# Patient Record
Sex: FEMALE | Race: WHITE | NOT HISPANIC OR LATINO | Employment: FULL TIME | URBAN - METROPOLITAN AREA
[De-identification: names, ages, dates, MRNs, and addresses within clinical notes are randomized per-mention and may not be internally consistent; named-entity substitution may affect disease eponyms.]

---

## 2019-07-11 ENCOUNTER — TRANSFERRED RECORDS (OUTPATIENT)
Dept: HEALTH INFORMATION MANAGEMENT | Facility: CLINIC | Age: 34
End: 2019-07-11

## 2019-07-11 LAB
HPV ABSTRACT: NORMAL
PAP-ABSTRACT: NORMAL

## 2021-01-18 ENCOUNTER — OFFICE VISIT (OUTPATIENT)
Dept: FAMILY MEDICINE | Facility: CLINIC | Age: 36
End: 2021-01-18
Payer: COMMERCIAL

## 2021-01-18 VITALS
HEART RATE: 69 BPM | SYSTOLIC BLOOD PRESSURE: 133 MMHG | OXYGEN SATURATION: 99 % | BODY MASS INDEX: 24.24 KG/M2 | HEIGHT: 63 IN | WEIGHT: 136.8 LBS | DIASTOLIC BLOOD PRESSURE: 74 MMHG | TEMPERATURE: 99 F

## 2021-01-18 DIAGNOSIS — Z32.01 PREGNANCY TEST PERFORMED, PREGNANCY CONFIRMED: Primary | ICD-10-CM

## 2021-01-18 LAB — HCG UR QL: POSITIVE

## 2021-01-18 PROCEDURE — 81025 URINE PREGNANCY TEST: CPT | Performed by: FAMILY MEDICINE

## 2021-01-18 PROCEDURE — 99203 OFFICE O/P NEW LOW 30 MIN: CPT | Performed by: FAMILY MEDICINE

## 2021-01-18 RX ORDER — ALBUTEROL SULFATE 90 UG/1
1-2 AEROSOL, METERED RESPIRATORY (INHALATION)
COMMUNITY
Start: 2020-01-02 | End: 2021-09-11

## 2021-01-18 RX ORDER — PRENATAL VIT/IRON FUM/FOLIC AC 27MG-0.8MG
1 TABLET ORAL DAILY
Qty: 90 TABLET | Refills: 3 | Status: SHIPPED | OUTPATIENT
Start: 2021-01-18

## 2021-01-18 RX ORDER — FEXOFENADINE HCL 60 MG/1
60 TABLET, FILM COATED ORAL
COMMUNITY
End: 2021-01-25

## 2021-01-18 RX ORDER — BETAMETHASONE DIPROPIONATE 0.5 MG/G
OINTMENT, AUGMENTED TOPICAL
COMMUNITY
Start: 2020-09-29 | End: 2021-09-11

## 2021-01-18 ASSESSMENT — MIFFLIN-ST. JEOR: SCORE: 1277.65

## 2021-01-18 ASSESSMENT — PAIN SCALES - GENERAL: PAINLEVEL: NO PAIN (0)

## 2021-01-18 NOTE — PROGRESS NOTES
"  Assessment & Plan     Pregnancy test performed, pregnancy confirmed  Positive test, confirmed.  Last menstrual cycle was November 20, 2020  Patient continue with prenatal vitamin.  Advised patient to avoid over-the-counter NSAIDs.  She may take Tylenol as needed.  She was given a card to follow-up with OB/GYN .    - HCG Qual, Urine ([id number])  - Prenatal Vit-Fe Fumarate-FA (PRENATAL MULTIVITAMIN W/IRON) 27-0.8 MG tablet; Take 1 tablet by mouth daily    Review of the result(s) of each unique test - pregnancy test           There are no Patient Instructions on file for this visit.    No follow-ups on file.    Singh Gray MD  [facility] ALYSON Rhodes is a 35 year old who presents to clinic today for the following health issues :  Patient had a positive pregnancy test, she is G1, para 0.  Her last menstrual cycle was November 20, 2020.  Currently denies nausea, has no morning sickness, has no other symptoms.  She is on birth control pill.    Review of Systems   Constitutional, HEENT, cardiovascular, pulmonary, gi and gu systems are negative, except as otherwise noted.      Objective    /74 (BP Location: Left arm, Patient Position: Chair, Cuff Size: Adult Regular)   Pulse 69   Temp 99  F (37.2  C) (Oral)   Ht 1.605 m (5' 3.19\")   Wt 62.1 kg (136 lb 12.8 oz)   SpO2 99%   BMI 24.09 kg/m    Body mass index is 24.09 kg/m .  Physical Exam   GENERAL: healthy, alert and no distress  PSYCH: mentation appears normal, affect normal/bright    Positive for Pregnancy test.        Singh Gray MD        "

## 2021-01-25 ENCOUNTER — PRENATAL OFFICE VISIT (OUTPATIENT)
Dept: NURSING | Facility: CLINIC | Age: 36
End: 2021-01-25
Payer: COMMERCIAL

## 2021-01-25 ENCOUNTER — RECORDS - HEALTHEAST (OUTPATIENT)
Dept: ADMINISTRATIVE | Facility: OTHER | Age: 36
End: 2021-01-25

## 2021-01-25 VITALS — BODY MASS INDEX: 24.1 KG/M2 | WEIGHT: 136 LBS | HEIGHT: 63 IN

## 2021-01-25 DIAGNOSIS — Z23 NEED FOR TDAP VACCINATION: ICD-10-CM

## 2021-01-25 DIAGNOSIS — O09.519 ENCOUNTER FOR SUPERVISION OF PRIMIGRAVIDA OF ADVANCED MATERNAL AGE: Primary | ICD-10-CM

## 2021-01-25 PROCEDURE — 99207 PR NO CHARGE NURSE ONLY: CPT

## 2021-01-25 ASSESSMENT — MIFFLIN-ST. JEOR: SCORE: 1274.98

## 2021-01-25 NOTE — PROGRESS NOTES
Important Information for Provider:     New ob nurse intake by phone, first pregnancy, AMA.  Ordered first trimester screening/NIPT. Handouts reviewed and given at NOB appointment 1/28/2021 with Dr Lira. Recommended B6, Unisom for nausea, patient gets nauseated at night.      Patient's mother has form of anemia( please ask what it was again, did not have the correct spelling)     Caffeine intake/servings daily - 1  Calcium intake/servings daily - 3 takes calcium 500 mg with D3  Exercise 3 times weekly - describe, walks most days, low impact, barre classes, yoga   Sunscreen used - Yes  Seatbelts used - Yes  Guns stored in the home - No  Self Breast Exam - Yes  Pap test up to date -  Yes  Eye exam up to date -  No  Dental exam up to date -  No  Immunizations reviewed and up to date - Yes  Abuse: Current or Past (Physical, Sexual or Emotional) - No  Do you feel safe in your environment - Yes  Do you cope well with stress - Yes  Do you suffer from insomnia - No         Prenatal OB Questionnaire  Patient supplied answers from flow sheet for:  Prenatal OB Questionnaire.  Past Medical History  Diabetes?: No  Hypertension : No  Heart disease, mitral valve prolapse or rheumatic fever?: No  An autoimmune disease such as lupus or rheumatoid arthritis?: No  Kidney disease or urinary tract infection?: No  Epilepsy, seizures or spells?: No  Migraine headaches?: No  A stroke or loss of function or sensation?: No  Any other neurological problems?: No  Have you ever been treated for depression?: No  Are you having problems with crying spells or loss of self-esteem?: No  Have you ever required psychiatric care?: No  Have you ever had hepatitis, liver disease or jaundice?: No  Have you been treated for blood clots in your veins, deep vein thrombosis, inflammation in the veins, thrombosis, phlebitis, pulmonary embolism or varicosities?: No  Have you had excessive bleeding after surgery or dental work?: No  Do you bleed more than other  women after a cut or scratch?: No  Do you have a history of anemia?: No  Have you ever had thyroid problems or taken thyroid medication?: No   Do you have any endocrine problems?: No  Have you ever been in a major accident or suffered serious trauma?: No  Within the last year, has anyone hit, slapped, kicked or otherwise hurt you?: No  In the last year, has anyone forced you to have sex when you didn't want to?: No    Past Medical History 2   Have you ever received a blood transfusion?: No  Would you refuse a blood transfusion if a doctor judged it to be medically necessary?: No   If you answered Yes, would you rather die than receive a blood transfusion?: No  If you answered Yes, is this for Hoahaoism reasons?: No  Does anyone in your home smoke?: No  Do you use tobacco products?: No  Do you drink beer, wine or hard liquor?: No  Do you use any of the following: marijuana, speed, cocaine, heroin, hallucinogens or other drugs?: No   Is your blood type Rh negative?: Unknown  Have you ever had abnormal antibodies in your blood?: Unknown  Have you ever had asthma?: Yes  Have you ever had tuberculosis?: No  Do you have any allergies to drugs or over-the-counter medications?: No  Allergies: Dust Mites, Aspartame, Ethanol, Venlafaxine, Hydrochloride, Sertraline: seasonal  Have you had any breast problems?: No  Have you ever ?: No  Have you had any gynecological surgical procedures such as cervical conization, a LEEP procedure, laser treatment, cryosurgery of the cervix or a dilation and curettage, etc?: No  Have you ever had any other surgical procedures?: wisdom teeth  Have you been hospitalized for a nonsurgical reason excluding normal delivery?: No  Have you ever had any anesthetic complications?: No  Have you ever had an abnormal pap smear?: (!) Yes, 2019, colp    Past Medical History (Continued)  Do you have a history of abnormalities of the uterus?: No  Did your mother take MARIA E or any other hormones when she  was pregnant with you?: No  Did it take you more than a year to become pregnant?: No  Have you ever been evaluated or treated for infertility?: No  Is there a history of medical problems in your family, which you feel may be important to this pregnancy?: No  Do you have any other problems we have not asked about which you feel may be important to this pregnancy?: No    Symptoms since last menstrual period  Do you have any of the following symptoms: abdominal pain, blood in stools or urine, chest pain, shortness of breath, coughing or vomiting up blood, your heart racing or skipping beats, nausea and vomiting, pain on urination or vaginal discharge or bleed: (!) Yes  Will the patient be 35 years old or older at the time of delivery?: (!) Yes    Has the patient, baby's father or anyone in either family had:  Thalassemia (Italian, Greek, Mediterranean or  background only) and an MCV result less than 80?: No  Neural tube defect such as meningomyelocele, spina bifida or anencephaly?: No  Congenital heart defect?: patient's brother  Down's Syndrome?: No  Tim-Sachs disease (Adventism, Cajun, Greenlandic-[place])?: No  Sickle cell disease or trait ()?: No  Hemophilia or other inherited problems of blood?: No  Muscular dystrophy?: No  Cystic fibrosis?: No  [place]'s chorea?: No  Mental retardation/autism?: No  If yes, was the person tested for fragile X?: No  Any other inherited genetic or chromosomal disorder?: No  Maternal metabolic disorder (e.g Insulin-dependent diabetes, PKU)?: No  A child with birth defects not listed above?: No  Recurrent pregnancy loss or stillbirth?: No   Has the patient had any medications/street drugs/alcohol since her last menstrual period?: No  Does the patient or baby's father have any other genetic risks?: No    Infection History   Do you object to being tested for Hepatitis B?: No  Do you object to being tested for HIV?: No   Do you feel that you are at high risk for coming in contact  with the AIDS virus?: No  Have you ever been treated for tuberculosis?: No  Have you ever had a positive skin test for tuberculosis?: No  Do you live with someone who has tuberculosis?: No  Have you ever been exposed to tuberculosis?: No  Do you have genital herpes?: No  Does your partner have genital herpes?: No  Have you had a viral illness since your last period?: No  Have you ever had gonorrhea, chlamydia, syphilis, venereal warts, trichomoniasis, pelvic inflammatory disease or any other sexually transmitted disease?: No  Do you know if you are a genital group B streptococcus carrier?: No  Have you had chicken pox/varicella?: (!) Yes   Have you been vaccinated against chicken Pox?: No  Have you had any other infectious diseases?: Whopping cough/ Mono 16 years old      Allergies as of 1/25/2021:    Allergies as of 01/25/2021     (No Known Allergies)       Current medications are:  Current Outpatient Medications   Medication Sig Dispense Refill     Prenatal Vit-Fe Fumarate-FA (PRENATAL MULTIVITAMIN W/IRON) 27-0.8 MG tablet Take 1 tablet by mouth daily 90 tablet 3     albuterol (PROAIR HFA/PROVENTIL HFA/VENTOLIN HFA) 108 (90 Base) MCG/ACT inhaler Inhale 1-2 puffs into the lungs       augmented betamethasone dipropionate (DIPROLENE-AF) 0.05 % external ointment Apply a thin layer to affected skin twice a day for up to 2 weeks (avoid face, body folds)       beclomethasone HFA (QVAR REDIHALER) 40 MCG/ACT inhaler Inhale 40 mcg into the lungs           Early ultrasound screening tool:    Does patient have irregular periods?  No  Did patient use hormonal birth control in the three months prior to positive urine pregnancy test? No  Is the patient breastfeeding?  No  Is the patient 10 weeks or greater at time of education visit?  No

## 2021-01-26 ENCOUNTER — TRANSCRIBE ORDERS (OUTPATIENT)
Dept: MATERNAL FETAL MEDICINE | Facility: CLINIC | Age: 36
End: 2021-01-26

## 2021-01-26 DIAGNOSIS — O26.90 PREGNANCY RELATED CONDITION, ANTEPARTUM: Primary | ICD-10-CM

## 2021-01-28 ENCOUNTER — APPOINTMENT (OUTPATIENT)
Dept: LAB | Facility: CLINIC | Age: 36
End: 2021-01-28
Payer: COMMERCIAL

## 2021-01-28 ENCOUNTER — PRENATAL OFFICE VISIT (OUTPATIENT)
Dept: OBGYN | Facility: CLINIC | Age: 36
End: 2021-01-28
Payer: COMMERCIAL

## 2021-01-28 VITALS
SYSTOLIC BLOOD PRESSURE: 122 MMHG | WEIGHT: 134 LBS | TEMPERATURE: 98.1 F | DIASTOLIC BLOOD PRESSURE: 77 MMHG | BODY MASS INDEX: 23.55 KG/M2 | HEART RATE: 77 BPM

## 2021-01-28 DIAGNOSIS — O09.511 ENCOUNTER FOR SUPERVISION OF PRIMIGRAVIDA OF ADVANCED MATERNAL AGE IN FIRST TRIMESTER: ICD-10-CM

## 2021-01-28 LAB
ALBUMIN UR-MCNC: NEGATIVE MG/DL
APPEARANCE UR: CLEAR
BILIRUB UR QL STRIP: NEGATIVE
COLOR UR AUTO: YELLOW
ERYTHROCYTE [DISTWIDTH] IN BLOOD BY AUTOMATED COUNT: 12 % (ref 10–15)
GLUCOSE UR STRIP-MCNC: NEGATIVE MG/DL
HCT VFR BLD AUTO: 37.8 % (ref 35–47)
HGB BLD-MCNC: 12.4 G/DL (ref 11.7–15.7)
HGB UR QL STRIP: ABNORMAL
KETONES UR STRIP-MCNC: NEGATIVE MG/DL
LEUKOCYTE ESTERASE UR QL STRIP: NEGATIVE
MCH RBC QN AUTO: 30.1 PG (ref 26.5–33)
MCHC RBC AUTO-ENTMCNC: 32.8 G/DL (ref 31.5–36.5)
MCV RBC AUTO: 92 FL (ref 78–100)
NITRATE UR QL: NEGATIVE
PH UR STRIP: 6 PH (ref 5–7)
PLATELET # BLD AUTO: 315 10E9/L (ref 150–450)
RBC # BLD AUTO: 4.12 10E12/L (ref 3.8–5.2)
SOURCE: ABNORMAL
SP GR UR STRIP: 1.02 (ref 1–1.03)
UROBILINOGEN UR STRIP-ACNC: 0.2 EU/DL (ref 0.2–1)
WBC # BLD AUTO: 12.3 10E9/L (ref 4–11)

## 2021-01-28 PROCEDURE — 87340 HEPATITIS B SURFACE AG IA: CPT | Performed by: OBSTETRICS & GYNECOLOGY

## 2021-01-28 PROCEDURE — 99000 SPECIMEN HANDLING OFFICE-LAB: CPT | Performed by: OBSTETRICS & GYNECOLOGY

## 2021-01-28 PROCEDURE — 99207 PR FIRST OB VISIT: CPT | Performed by: OBSTETRICS & GYNECOLOGY

## 2021-01-28 PROCEDURE — 86901 BLOOD TYPING SEROLOGIC RH(D): CPT | Performed by: OBSTETRICS & GYNECOLOGY

## 2021-01-28 PROCEDURE — 81003 URINALYSIS AUTO W/O SCOPE: CPT | Performed by: OBSTETRICS & GYNECOLOGY

## 2021-01-28 PROCEDURE — 36415 COLL VENOUS BLD VENIPUNCTURE: CPT | Performed by: OBSTETRICS & GYNECOLOGY

## 2021-01-28 PROCEDURE — 86780 TREPONEMA PALLIDUM: CPT | Mod: 90 | Performed by: OBSTETRICS & GYNECOLOGY

## 2021-01-28 PROCEDURE — 87086 URINE CULTURE/COLONY COUNT: CPT | Performed by: OBSTETRICS & GYNECOLOGY

## 2021-01-28 PROCEDURE — 86900 BLOOD TYPING SEROLOGIC ABO: CPT | Performed by: OBSTETRICS & GYNECOLOGY

## 2021-01-28 PROCEDURE — 86850 RBC ANTIBODY SCREEN: CPT | Performed by: OBSTETRICS & GYNECOLOGY

## 2021-01-28 PROCEDURE — 86762 RUBELLA ANTIBODY: CPT | Performed by: OBSTETRICS & GYNECOLOGY

## 2021-01-28 PROCEDURE — 87389 HIV-1 AG W/HIV-1&-2 AB AG IA: CPT | Performed by: OBSTETRICS & GYNECOLOGY

## 2021-01-28 PROCEDURE — 85027 COMPLETE CBC AUTOMATED: CPT | Performed by: OBSTETRICS & GYNECOLOGY

## 2021-01-29 LAB
ABO + RH BLD: NORMAL
ABO + RH BLD: NORMAL
BLD GP AB SCN SERPL QL: NORMAL
BLOOD BANK CMNT PATIENT-IMP: NORMAL
SPECIMEN EXP DATE BLD: NORMAL
T PALLIDUM AB SER QL: NONREACTIVE

## 2021-01-30 LAB
BACTERIA SPEC CULT: NORMAL
Lab: NORMAL
SPECIMEN SOURCE: NORMAL

## 2021-01-30 NOTE — PROGRESS NOTES
SUBJECTIVE: Meagan Monroy is a 35 year old   here for initial OB visit.  IUD removed last summer.   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   Mild nausea at night, otherwise doing well.    Past Medical History:   Diagnosis Date     Mild asthma        Past Surgical History:   Procedure Laterality Date     wisdom teeth         Family History   Problem Relation Age of Onset     Asthma Mother      Tongue cancer Father      Hypertension Father      Diabetes Paternal Grandmother      Heart Failure Paternal Grandmother      Childhood Heart Disease Brother        Social History     Socioeconomic History     Marital status: Single     Spouse name: Not on file     Number of children: Not on file     Years of education: Not on file     Highest education level: Not on file   Occupational History     Not on file   Social Needs     Financial resource strain: Not on file     Food insecurity     Worry: Not on file     Inability: Not on file     Transportation needs     Medical: Not on file     Non-medical: Not on file   Tobacco Use     Smoking status: Never Smoker     Smokeless tobacco: Never Used   Substance and Sexual Activity     Alcohol use: Not Currently     Drug use: Never     Sexual activity: Yes     Partners: Male   Lifestyle     Physical activity     Days per week: Not on file     Minutes per session: Not on file     Stress: Not on file   Relationships     Social connections     Talks on phone: Not on file     Gets together: Not on file     Attends Mandaen service: Not on file     Active member of club or organization: Not on file     Attends meetings of clubs or organizations: Not on file     Relationship status: Not on file     Intimate partner violence     Fear of current or ex partner: Not on file     Emotionally abused: Not on file     Physically abused: Not on file     Forced sexual activity: Not on file   Other Topics Concern     Not on file   Social  History Narrative     Not on file         Current Outpatient Medications:      augmented betamethasone dipropionate (DIPROLENE-AF) 0.05 % external ointment, Apply a thin layer to affected skin twice a day for up to 2 weeks (avoid face, body folds), Disp: , Rfl:      Prenatal Vit-Fe Fumarate-FA (PRENATAL MULTIVITAMIN W/IRON) 27-0.8 MG tablet, Take 1 tablet by mouth daily, Disp: 90 tablet, Rfl: 3     albuterol (PROAIR HFA/PROVENTIL HFA/VENTOLIN HFA) 108 (90 Base) MCG/ACT inhaler, Inhale 1-2 puffs into the lungs, Disp: , Rfl:      beclomethasone HFA (QVAR REDIHALER) 40 MCG/ACT inhaler, Inhale 40 mcg into the lungs, Disp: , Rfl:     No Known Allergies      Past Medical History of Father of Baby: No significant medical history    Review of Systems:   Constitutional, HEENT, cardiovascular, pulmonary, gi and gu systems are negative, except as otherwise noted.     History Since Last Menstrual Period: Nausea    EXAM:   Vitals:    01/28/21 1534   BP: 122/77   Pulse: 77   Temp: 98.1  F (36.7  C)   TempSrc: Oral   Weight: 60.8 kg (134 lb)     Body mass index is 23.55 kg/m .  GENERAL APPEARANCE: healthy, alert and no distress  EYES: EOMI,  PERRL  HENT: Nose and mouth without ulcers or lesions  NECK: no adenopathy, no asymmetry, masses, or scars and thyroid normal to palpation  RESP: lungs clear to auscultation - no rales, rhonchi or wheezes  BREAST: normal without masses, tenderness or nipple discharge and no palpable axillary masses or adenopathy  CV: regular rates and rhythm, normal S1 S2, no S3 or S4 and no murmur, click or rub -  ABDOMEN:  soft, nontender, no HSM or masses and bowel sounds normal  : normal cervix, adnexae, and uterus without masses or discharge  MS: extremities normal- no gross deformities noted, no evidence of inflammation in joints, FROM in all extremities.  SKIN: no suspicious lesions or rashes  NEURO: Normal strength and tone, sensory exam grossly normal, mentation intact and speech normal  PSYCH:  mentation appears normal and affect normal/bright  LYMPHATICS: No axillary, cervical, inguinal, or supraclavicular nodes    Pelvix exam:  Perineum: Intact;   Vulva: Normal;  Vagina: Normal mucosa, no discharge,   Cervix: Nulliparous, closed, mobile,  no discharge;  Uterus: 10 weeks, Normal shape, position and consistency;   Adnexa: Normal;  Anus: Normal without lesion or mass;   Bony Pelvis: Adequate.     Ultrasound: Informal for heart tones. Viable jacques IUP c/w previous dating.    ASSESSMENT/ PLAN:  Meagan Monroy is a 35 year old   at 9 weeks 9 days by LMP c/w ultrasound today. EDC 21  Follow up in 5 weeks.  Normal exercise.  Normal sexual activity.  Prenatal vitamins.  Anticipated weight gain:  BMI <25: 25-35 pounds  S/p flu shot. Plans COVID vaccine.   TDaP 27-36 weeks  Oriented to practice, PNC  Discussed aneuploidy screening, has NIPT scheduled. Discussed comprehensive fetal survey for AMA and ordered.

## 2021-02-01 LAB
HBV SURFACE AG SERPL QL IA: NONREACTIVE
HIV 1+2 AB+HIV1 P24 AG SERPL QL IA: NONREACTIVE
RUBV IGG SERPL IA-ACNC: 16 IU/ML

## 2021-02-03 ENCOUNTER — AMBULATORY - HEALTHEAST (OUTPATIENT)
Dept: MATERNAL FETAL MEDICINE | Facility: HOSPITAL | Age: 36
End: 2021-02-03

## 2021-02-03 DIAGNOSIS — O26.90 PREGNANCY, ANTEPARTUM, COMPLICATIONS: ICD-10-CM

## 2021-02-08 ENCOUNTER — AMBULATORY - HEALTHEAST (OUTPATIENT)
Dept: MATERNAL FETAL MEDICINE | Facility: HOSPITAL | Age: 36
End: 2021-02-08

## 2021-02-11 ENCOUNTER — OFFICE VISIT - HEALTHEAST (OUTPATIENT)
Dept: MATERNAL FETAL MEDICINE | Facility: HOSPITAL | Age: 36
End: 2021-02-11

## 2021-02-11 ENCOUNTER — RECORDS - HEALTHEAST (OUTPATIENT)
Dept: ADMINISTRATIVE | Facility: OTHER | Age: 36
End: 2021-02-11

## 2021-02-11 ENCOUNTER — AMBULATORY - HEALTHEAST (OUTPATIENT)
Dept: LAB | Facility: HOSPITAL | Age: 36
End: 2021-02-11

## 2021-02-11 ENCOUNTER — RECORDS - HEALTHEAST (OUTPATIENT)
Dept: ULTRASOUND IMAGING | Facility: HOSPITAL | Age: 36
End: 2021-02-11

## 2021-02-11 DIAGNOSIS — O09.511 SUPERVISION OF ELDERLY PRIMIGRAVIDA IN FIRST TRIMESTER: ICD-10-CM

## 2021-02-11 DIAGNOSIS — O26.90 PREGNANCY, ANTEPARTUM, COMPLICATIONS: ICD-10-CM

## 2021-02-11 DIAGNOSIS — O26.90 PREGNANCY RELATED CONDITIONS, UNSPECIFIED, UNSPECIFIED TRIMESTER: ICD-10-CM

## 2021-02-11 DIAGNOSIS — O09.529 AMA (ADVANCED MATERNAL AGE) MULTIGRAVIDA 35+: ICD-10-CM

## 2021-02-15 ENCOUNTER — TELEPHONE (OUTPATIENT)
Dept: OBGYN | Facility: CLINIC | Age: 36
End: 2021-02-15

## 2021-02-15 NOTE — TELEPHONE ENCOUNTER
Pt has recently started experiencing really severe pain in her lower abdomen.  She got diaphoretic and pain feels like very strong waves of cramping.  No bleeding or LOF. No c/o constipation. Putting some pressure on the area did seem to decrease the pain slightly.  She wondered if she could take tylenol for the pain.  Advised tylenol would be ok, moving around, knees to chest to try and disperse gas pain.  She is going to call back in an hour if pain has not gotten better.  Advised possible clinic visit or if pain is more severe, ED for evaluation.  Ankita Arora RN

## 2021-02-15 NOTE — TELEPHONE ENCOUNTER
Patient called back. Her pain has not gotten worse, but it's still there. She drank 3 glasses of water and some Tylenol. Seems to be helping. Is not taking anything for constipation. Advised increasing water to 10-12 glasses of fluids per day, increase fiber intake and can add in Miralax or colace to try to have bowel movement. Pt stated understanding. She will call back if the pain gets worse or is that rolling feeling again. Advised to present to ER after hours if pain is severe. Patient stated understanding.   Elma Lowry RN-BSN

## 2021-02-17 ENCOUNTER — COMMUNICATION - HEALTHEAST (OUTPATIENT)
Dept: MATERNAL FETAL MEDICINE | Facility: HOSPITAL | Age: 36
End: 2021-02-17

## 2021-02-17 LAB — TRISOMY 21,18,13 - HE HISTORICAL: NORMAL

## 2021-03-02 ENCOUNTER — PRENATAL OFFICE VISIT (OUTPATIENT)
Dept: OBGYN | Facility: CLINIC | Age: 36
End: 2021-03-02
Payer: COMMERCIAL

## 2021-03-02 VITALS
BODY MASS INDEX: 24.31 KG/M2 | SYSTOLIC BLOOD PRESSURE: 102 MMHG | WEIGHT: 137.2 LBS | DIASTOLIC BLOOD PRESSURE: 57 MMHG | HEART RATE: 66 BPM | TEMPERATURE: 97.8 F | HEIGHT: 63 IN

## 2021-03-02 DIAGNOSIS — O09.512 ENCOUNTER FOR SUPERVISION OF PRIMIGRAVIDA OF ADVANCED MATERNAL AGE IN SECOND TRIMESTER: Primary | ICD-10-CM

## 2021-03-02 PROCEDURE — 99207 PR PRENATAL VISIT: CPT | Performed by: OBSTETRICS & GYNECOLOGY

## 2021-03-02 ASSESSMENT — ANXIETY QUESTIONNAIRES
7. FEELING AFRAID AS IF SOMETHING AWFUL MIGHT HAPPEN: SEVERAL DAYS
5. BEING SO RESTLESS THAT IT IS HARD TO SIT STILL: NOT AT ALL
2. NOT BEING ABLE TO STOP OR CONTROL WORRYING: NOT AT ALL
IF YOU CHECKED OFF ANY PROBLEMS ON THIS QUESTIONNAIRE, HOW DIFFICULT HAVE THESE PROBLEMS MADE IT FOR YOU TO DO YOUR WORK, TAKE CARE OF THINGS AT HOME, OR GET ALONG WITH OTHER PEOPLE: NOT DIFFICULT AT ALL
1. FEELING NERVOUS, ANXIOUS, OR ON EDGE: NOT AT ALL
GAD7 TOTAL SCORE: 3
3. WORRYING TOO MUCH ABOUT DIFFERENT THINGS: SEVERAL DAYS
6. BECOMING EASILY ANNOYED OR IRRITABLE: SEVERAL DAYS

## 2021-03-02 ASSESSMENT — MIFFLIN-ST. JEOR: SCORE: 1280.43

## 2021-03-02 ASSESSMENT — PATIENT HEALTH QUESTIONNAIRE - PHQ9
SUM OF ALL RESPONSES TO PHQ QUESTIONS 1-9: 2
5. POOR APPETITE OR OVEREATING: NOT AT ALL

## 2021-03-02 NOTE — PROGRESS NOTES
Doing well.   Had some constipation - used miralax.   NIPT normal, did not disclose fetal sex.  Comprehensive fetal survey scheduled.   RTC 4 weeks. AFP then.

## 2021-03-03 ASSESSMENT — ANXIETY QUESTIONNAIRES: GAD7 TOTAL SCORE: 3

## 2021-03-21 ENCOUNTER — HEALTH MAINTENANCE LETTER (OUTPATIENT)
Age: 36
End: 2021-03-21

## 2021-03-29 ENCOUNTER — RECORDS - HEALTHEAST (OUTPATIENT)
Dept: ADMINISTRATIVE | Facility: OTHER | Age: 36
End: 2021-03-29

## 2021-03-29 ENCOUNTER — RECORDS - HEALTHEAST (OUTPATIENT)
Dept: ULTRASOUND IMAGING | Facility: HOSPITAL | Age: 36
End: 2021-03-29

## 2021-03-29 ENCOUNTER — OFFICE VISIT - HEALTHEAST (OUTPATIENT)
Dept: MATERNAL FETAL MEDICINE | Facility: HOSPITAL | Age: 36
End: 2021-03-29

## 2021-03-29 DIAGNOSIS — Z82.79 FAMILY HISTORY OF CONGENITAL HEART DEFECT: ICD-10-CM

## 2021-03-29 DIAGNOSIS — O09.529 SUPERVISION OF ELDERLY MULTIGRAVIDA, UNSPECIFIED TRIMESTER: ICD-10-CM

## 2021-04-01 ENCOUNTER — PRENATAL OFFICE VISIT (OUTPATIENT)
Dept: OBGYN | Facility: CLINIC | Age: 36
End: 2021-04-01
Payer: COMMERCIAL

## 2021-04-01 VITALS
BODY MASS INDEX: 25.2 KG/M2 | SYSTOLIC BLOOD PRESSURE: 118 MMHG | HEART RATE: 91 BPM | TEMPERATURE: 98.3 F | WEIGHT: 143.4 LBS | DIASTOLIC BLOOD PRESSURE: 79 MMHG

## 2021-04-01 DIAGNOSIS — O09.512 ENCOUNTER FOR SUPERVISION OF PRIMIGRAVIDA OF ADVANCED MATERNAL AGE IN SECOND TRIMESTER: Primary | ICD-10-CM

## 2021-04-01 PROCEDURE — 99207 PR PRENATAL VISIT: CPT | Performed by: OBSTETRICS & GYNECOLOGY

## 2021-04-01 NOTE — PROGRESS NOTES
Doing well.   Thinks she's feeling occasional movement.  Comprehensive fetal survey normal.   Having allergy symptoms, discussed meds.   RTC 3-4 weeks

## 2021-04-22 ENCOUNTER — PRENATAL OFFICE VISIT (OUTPATIENT)
Dept: OBGYN | Facility: CLINIC | Age: 36
End: 2021-04-22
Payer: COMMERCIAL

## 2021-04-22 VITALS
TEMPERATURE: 96.9 F | SYSTOLIC BLOOD PRESSURE: 126 MMHG | DIASTOLIC BLOOD PRESSURE: 83 MMHG | HEIGHT: 63 IN | HEART RATE: 82 BPM | WEIGHT: 150 LBS | BODY MASS INDEX: 26.58 KG/M2

## 2021-04-22 DIAGNOSIS — O09.512 ENCOUNTER FOR SUPERVISION OF PRIMIGRAVIDA OF ADVANCED MATERNAL AGE IN SECOND TRIMESTER: Primary | ICD-10-CM

## 2021-04-22 PROCEDURE — 99207 PR PRENATAL VISIT: CPT | Performed by: OBSTETRICS & GYNECOLOGY

## 2021-04-22 ASSESSMENT — MIFFLIN-ST. JEOR: SCORE: 1333.49

## 2021-04-22 NOTE — PROGRESS NOTES
Please abstract the following data from this visit with this patient into the appropriate field in Epic:    Tests that can be patient reported without a hard copy:    Pap smear done on this date: 7/11/2019 (approximately), by this group: [facility], results were NIL, HPV negative.

## 2021-04-22 NOTE — PROGRESS NOTES
Doing well.   Feeling fetal movement.   Normal comprehensive survey, fetal echo coming up for family history.   COVID vaccine complete, will bring card.   RTC 4 weeks.

## 2021-04-22 NOTE — Clinical Note
Pap smear done on this date: 7/11/2019 (approximately), by this group: [facility], results were NIL, HPV negative.

## 2021-04-30 ENCOUNTER — RECORDS - HEALTHEAST (OUTPATIENT)
Dept: ULTRASOUND IMAGING | Facility: HOSPITAL | Age: 36
End: 2021-04-30

## 2021-04-30 ENCOUNTER — OFFICE VISIT - HEALTHEAST (OUTPATIENT)
Dept: MATERNAL FETAL MEDICINE | Facility: HOSPITAL | Age: 36
End: 2021-04-30

## 2021-04-30 ENCOUNTER — MEDICAL CORRESPONDENCE (OUTPATIENT)
Dept: HEALTH INFORMATION MANAGEMENT | Facility: CLINIC | Age: 36
End: 2021-04-30

## 2021-04-30 ENCOUNTER — RECORDS - HEALTHEAST (OUTPATIENT)
Dept: ADMINISTRATIVE | Facility: OTHER | Age: 36
End: 2021-04-30

## 2021-04-30 ENCOUNTER — TRANSCRIBE ORDERS (OUTPATIENT)
Dept: MATERNAL FETAL MEDICINE | Facility: CLINIC | Age: 36
End: 2021-04-30

## 2021-04-30 DIAGNOSIS — Z82.79 FAMILY HISTORY OF OTHER CONGENITAL MALFORMATIONS, DEFORMATIONS AND CHROMOSOMAL ABNORMALITIES: ICD-10-CM

## 2021-04-30 DIAGNOSIS — O26.90 PREGNANCY RELATED CONDITION, ANTEPARTUM: Primary | ICD-10-CM

## 2021-04-30 DIAGNOSIS — O35.8XX0 FAMILY OR MATERNAL HISTORIC RISK OF CONGENITAL ANOMALY, ANTEPARTUM, SINGLE OR UNSPECIFIED FETUS: ICD-10-CM

## 2021-05-18 ENCOUNTER — PRENATAL OFFICE VISIT (OUTPATIENT)
Dept: OBGYN | Facility: CLINIC | Age: 36
End: 2021-05-18
Payer: COMMERCIAL

## 2021-05-18 VITALS
HEART RATE: 80 BPM | BODY MASS INDEX: 26.36 KG/M2 | TEMPERATURE: 98 F | HEIGHT: 63 IN | DIASTOLIC BLOOD PRESSURE: 69 MMHG | SYSTOLIC BLOOD PRESSURE: 113 MMHG

## 2021-05-18 DIAGNOSIS — D64.9 LOW HEMOGLOBIN: Primary | ICD-10-CM

## 2021-05-18 DIAGNOSIS — O09.512 ENCOUNTER FOR SUPERVISION OF PRIMIGRAVIDA OF ADVANCED MATERNAL AGE IN SECOND TRIMESTER: ICD-10-CM

## 2021-05-18 LAB — HGB BLD-MCNC: 10.8 G/DL (ref 11.7–15.7)

## 2021-05-18 PROCEDURE — 99207 PR PRENATAL VISIT: CPT | Performed by: OBSTETRICS & GYNECOLOGY

## 2021-05-18 PROCEDURE — 36415 COLL VENOUS BLD VENIPUNCTURE: CPT | Performed by: OBSTETRICS & GYNECOLOGY

## 2021-05-18 PROCEDURE — 82950 GLUCOSE TEST: CPT | Performed by: OBSTETRICS & GYNECOLOGY

## 2021-05-18 PROCEDURE — 99N1025 PR STATISTIC OBHBG - HEMOGLOBIN: Performed by: OBSTETRICS & GYNECOLOGY

## 2021-05-18 ASSESSMENT — ANXIETY QUESTIONNAIRES
3. WORRYING TOO MUCH ABOUT DIFFERENT THINGS: SEVERAL DAYS
IF YOU CHECKED OFF ANY PROBLEMS ON THIS QUESTIONNAIRE, HOW DIFFICULT HAVE THESE PROBLEMS MADE IT FOR YOU TO DO YOUR WORK, TAKE CARE OF THINGS AT HOME, OR GET ALONG WITH OTHER PEOPLE: NOT DIFFICULT AT ALL
5. BEING SO RESTLESS THAT IT IS HARD TO SIT STILL: NOT AT ALL
6. BECOMING EASILY ANNOYED OR IRRITABLE: SEVERAL DAYS
1. FEELING NERVOUS, ANXIOUS, OR ON EDGE: NOT AT ALL
7. FEELING AFRAID AS IF SOMETHING AWFUL MIGHT HAPPEN: NOT AT ALL
2. NOT BEING ABLE TO STOP OR CONTROL WORRYING: NOT AT ALL
GAD7 TOTAL SCORE: 2

## 2021-05-18 ASSESSMENT — PATIENT HEALTH QUESTIONNAIRE - PHQ9
5. POOR APPETITE OR OVEREATING: NOT AT ALL
SUM OF ALL RESPONSES TO PHQ QUESTIONS 1-9: 0

## 2021-05-18 NOTE — PROGRESS NOTES
Childbirth classes? YES, planning on it  Plan on breastfeeding? Yes: discussed prescription for pump  Birthcontrol? oral contraceptive  Sex on ultrasound? did not determine  Circumsion? Yes, discussed outpatient  Peds doc? Unsure, lives in [facility].    Good fetal movement.   Had fetal echo, didn't have a good experience with communication. One pulmonary vessel seen, has echo with peds cards scheduled. Discussed.   GCT today.   RTC 4 weeks.

## 2021-05-19 LAB — GLUCOSE 1H P 50 G GLC PO SERPL-MCNC: 137 MG/DL (ref 60–129)

## 2021-05-19 ASSESSMENT — ANXIETY QUESTIONNAIRES: GAD7 TOTAL SCORE: 2

## 2021-05-25 ENCOUNTER — HOSPITAL ENCOUNTER (OUTPATIENT)
Facility: CLINIC | Age: 36
End: 2021-05-25
Admitting: OBSTETRICS & GYNECOLOGY
Payer: COMMERCIAL

## 2021-05-25 DIAGNOSIS — D64.9 LOW HEMOGLOBIN: ICD-10-CM

## 2021-05-25 DIAGNOSIS — O09.512 ENCOUNTER FOR SUPERVISION OF PRIMIGRAVIDA OF ADVANCED MATERNAL AGE IN SECOND TRIMESTER: ICD-10-CM

## 2021-05-25 LAB
ERYTHROCYTE [DISTWIDTH] IN BLOOD BY AUTOMATED COUNT: 12.6 % (ref 10–15)
FERRITIN SERPL-MCNC: 62 NG/ML (ref 12–150)
HCT VFR BLD AUTO: 32.3 % (ref 35–47)
HGB BLD-MCNC: 10.8 G/DL (ref 11.7–15.7)
IRON SATN MFR SERPL: 21 % (ref 15–46)
IRON SERPL-MCNC: 86 UG/DL (ref 35–180)
MCH RBC QN AUTO: 30.3 PG (ref 26.5–33)
MCHC RBC AUTO-ENTMCNC: 33.4 G/DL (ref 31.5–36.5)
MCV RBC AUTO: 91 FL (ref 78–100)
PLATELET # BLD AUTO: 284 10E9/L (ref 150–450)
RBC # BLD AUTO: 3.56 10E12/L (ref 3.8–5.2)
TIBC SERPL-MCNC: 408 UG/DL (ref 240–430)
WBC # BLD AUTO: 9 10E9/L (ref 4–11)

## 2021-05-25 PROCEDURE — 82728 ASSAY OF FERRITIN: CPT | Performed by: OBSTETRICS & GYNECOLOGY

## 2021-05-25 PROCEDURE — 83540 ASSAY OF IRON: CPT | Performed by: OBSTETRICS & GYNECOLOGY

## 2021-05-25 PROCEDURE — 36415 COLL VENOUS BLD VENIPUNCTURE: CPT | Performed by: OBSTETRICS & GYNECOLOGY

## 2021-05-25 PROCEDURE — 82952 GTT-ADDED SAMPLES: CPT | Performed by: OBSTETRICS & GYNECOLOGY

## 2021-05-25 PROCEDURE — 82951 GLUCOSE TOLERANCE TEST (GTT): CPT | Performed by: OBSTETRICS & GYNECOLOGY

## 2021-05-25 PROCEDURE — 83550 IRON BINDING TEST: CPT | Performed by: OBSTETRICS & GYNECOLOGY

## 2021-05-25 PROCEDURE — 85027 COMPLETE CBC AUTOMATED: CPT | Performed by: OBSTETRICS & GYNECOLOGY

## 2021-05-27 LAB
GLUCOSE 1H P 100 G GLC PO SERPL-MCNC: 156 MG/DL (ref 60–179)
GLUCOSE 2H P 100 G GLC PO SERPL-MCNC: 101 MG/DL (ref 60–154)
GLUCOSE 3H P 100 G GLC PO SERPL-MCNC: 77 MG/DL (ref 60–139)
GLUCOSE P FAST SERPL-MCNC: 82 MG/DL (ref 60–94)

## 2021-06-04 ENCOUNTER — OFFICE VISIT (OUTPATIENT)
Dept: CARDIOLOGY | Facility: CLINIC | Age: 36
End: 2021-06-04
Payer: COMMERCIAL

## 2021-06-04 ENCOUNTER — HOSPITAL ENCOUNTER (OUTPATIENT)
Dept: CARDIOLOGY | Facility: CLINIC | Age: 36
Discharge: HOME | End: 2021-06-04
Attending: OBSTETRICS & GYNECOLOGY | Admitting: OBSTETRICS & GYNECOLOGY
Payer: COMMERCIAL

## 2021-06-04 DIAGNOSIS — O35.BXX0 FETAL CARDIAC DISEASE AFFECTING PREGNANCY, SINGLE OR UNSPECIFIED FETUS: Primary | ICD-10-CM

## 2021-06-04 DIAGNOSIS — O26.90 PREGNANCY RELATED CONDITION, ANTEPARTUM: ICD-10-CM

## 2021-06-04 PROCEDURE — 76827 ECHO EXAM OF FETAL HEART: CPT | Mod: 26 | Performed by: PEDIATRICS

## 2021-06-04 PROCEDURE — 76827 ECHO EXAM OF FETAL HEART: CPT

## 2021-06-04 PROCEDURE — 93325 DOPPLER ECHO COLOR FLOW MAPG: CPT

## 2021-06-04 PROCEDURE — 76825 ECHO EXAM OF FETAL HEART: CPT | Mod: 26 | Performed by: PEDIATRICS

## 2021-06-04 PROCEDURE — 99202 OFFICE O/P NEW SF 15 MIN: CPT | Mod: 25 | Performed by: PEDIATRICS

## 2021-06-04 PROCEDURE — 93325 DOPPLER ECHO COLOR FLOW MAPG: CPT | Mod: 26 | Performed by: PEDIATRICS

## 2021-06-04 NOTE — PROGRESS NOTES
[facility]   Heart Center Fetal Consult Note    Patient:  Meagan Monroy MRN:  [id number]   YOB: 1985 Age:  36 year old   Date of Visit:  2021 PCP:  No Ref-Primary, Physician     Dear Dr. Villalobos,     I had the pleasure of seeing Meagan Monroy at the [facility] on 2021 in fetal cardiology consultation for fetal echocardiogram results. She presented today accompanied by her . As you know, she is a 36 year old  at 28w0d who presented for fetal echocardiogram today because of family history of congenital heart disease (maternal uncle with D-TGA) and incomplete cardiac exam.    I performed and interpreted the fetal echocardiogram today, which demonstrated normal fetal cardiac anatomy. Normal fetal intracardiac connections. Normal right and left ventricular size and function. No hydrops. Fetal heart rate is regular at 129 bpm.     I reviewed the echo findings today with Meagan Monroy and her partner. She is aware that the study was within normal limits with no major cardiac abnormalities. She is aware of the general limitations of fetal echocardiography. No additional fetal echocardiograms are recommended. No  cardiac follow-up is required.     Thank you for allowing me to participate in Meagan's care. Please do not hesitate to contact me with questions or concerns.    This visit was separate from the performance and interpretation of the ultrasound. The majority of the time (>50%) was spent in counseling and coordination of care. I spent approximately 15 minutes in face-to-face time reviewing the above considerations.    Elgin Hayden M.D.  Pediatric Cardiology  [address], 5th floor, [place] [address]  Phone [phone]  Fax [phone]

## 2021-06-15 ENCOUNTER — PRENATAL OFFICE VISIT (OUTPATIENT)
Dept: OBGYN | Facility: CLINIC | Age: 36
End: 2021-06-15
Payer: COMMERCIAL

## 2021-06-15 VITALS
DIASTOLIC BLOOD PRESSURE: 67 MMHG | HEIGHT: 63 IN | BODY MASS INDEX: 28.21 KG/M2 | HEART RATE: 79 BPM | TEMPERATURE: 98.4 F | SYSTOLIC BLOOD PRESSURE: 107 MMHG | WEIGHT: 159.2 LBS

## 2021-06-15 DIAGNOSIS — O09.513 ENCOUNTER FOR SUPERVISION OF PRIMIGRAVIDA OF ADVANCED MATERNAL AGE IN THIRD TRIMESTER: Primary | ICD-10-CM

## 2021-06-15 DIAGNOSIS — Z23 NEED FOR TDAP VACCINATION: ICD-10-CM

## 2021-06-15 PROCEDURE — 99207 PR PRENATAL VISIT: CPT | Performed by: OBSTETRICS & GYNECOLOGY

## 2021-06-15 PROCEDURE — 90715 TDAP VACCINE 7 YRS/> IM: CPT | Performed by: OBSTETRICS & GYNECOLOGY

## 2021-06-15 PROCEDURE — 90471 IMMUNIZATION ADMIN: CPT | Performed by: OBSTETRICS & GYNECOLOGY

## 2021-06-15 RX ORDER — FERROUS SULFATE 325(65) MG
325 TABLET ORAL EVERY OTHER DAY
Status: ON HOLD | COMMUNITY
End: 2021-08-28

## 2021-06-15 ASSESSMENT — MIFFLIN-ST. JEOR: SCORE: 1375.22

## 2021-06-15 NOTE — PROGRESS NOTES
[facility]  Genetic Counseling Consult    Patient: Meagan Monroy YOB: 1985   Date of Service: 2021        Meagan Monroy was seen at [facility] for genetic consultation to discuss the options for screening and testing for fetal chromosome abnormalities.  The indication for genetic counseling is advanced maternal age.        Impression/Plan:   1.  Meagan had an ultrasound and blood draw for NIPT (Innatal test through [facility]).  Results are expected within 5-7 days, and will be available in [email or web address].  We will contact her to discuss the results, and a copy will be forwarded to the office of the referring OB provider.  Meagan will be contacted at the phone number she provided, [phone], and requests that results not be left in her voicemail if she cannot be reached.  A generic callback message will be left instead and Meagan contact genetic counseling to review results.  Meagan plans to decline to learn fetal sex chromosomes indicated by testing    2.  Maternal serum AFP (single marker screen) is recommended after 15 weeks to screen for open neural tube defects. A quad screen should not be performed.    3.  An 18-20 week comprehensive ultrasound is standard of care for all women 35 or older at delivery.    4.  A fetal echocardiogram at 22-24 weeks is recommended due to the family history of congenital heart defects.     Pregnancy History:   /Parity:    Age at Delivery: 36 y.o.  GIN: 2021, by Last Menstrual Period  Gestational Age: 11w6d    No significant complications or exposures were reported in the current pregnancy.      Medical History:   Meagan trevizo reported medical history is not expected to impact pregnancy management or risks to fetal development.       Family History:   A three-generation pedigree was obtained, and is scanned under the  Media  tab.   The following  significant findings were reported by Meagan:  Meagan's brother was born with transposition of the great arteries, requiring surgical correction early in his life.  He is alive and well with no other known health concerns or complications from this history.  We discussed the multifactorial nature of congenital heart defects, and that this history may represent an increase in risk for the pregnancy to have similar differences.  We discussed a recommendation of increased ultrasound surveillance via level II ultrasound and fetal echocardiogram.   Meagan reports that her mother has an unknown thalassemia condition.  Per report, she has never required any management or intervention for this concern, but that she was diagnosed many years ago.  Meagan was not aware of a specific type of thalassemia (beta, alpha).  We reviewed the autosomal recessive nature of thalassemia conditions, and discussed that the clinical picture described for her mother could be a thalassemia trait, in which an individual is a carrier, or has one variant copy of a thalassemia gene.  This history puts Meagan at increased risk for having a thalassemia trait herself.  Her  reports no known family history of thalassemia, anemia, low iron, or any other blood disorders.  We discussed that without knowing the exact concern for Meagan's mother, an exact risk estimate is not possible, however, the risks for the couple to have a clinically significant thalassemia are likely low, but not zero.  Carrier screening for thalassemias was discussed and declined.      Otherwise, the reported family history is negative for multiple miscarriages, stillbirths, birth defects, cognitive impairment, known genetic conditions, and consanguinity.       Carrier Screening:   The patient reports that she and the father of the pregnancy have  ancestry:     Cystic fibrosis is an autosomal recessive genetic condition that occurs with increased frequency in  individuals of  ancestry and carrier screening for this condition is available.  In addition,  screening in the [facility] includes cystic fibrosis.    The patient reports that she is of Mediterranean ancestry:      The hemoglobinopathies are a group of genetic blood diseases that occur with increased frequency in individuals of Mediterranean ancestry and carrier screening for these conditions is available.  Carrier screening for the hemoglobinopathies includes a CBC with red blood cell indices, a ferritin level, and a quantitative hemoglobin electrophoresis or HPLC.  In addition,  screening in the [facility] includes many of the hemoglobinopathies.      Expanded carrier screening for mutations in a large panel of genes associated with autosomal recessive conditions including cystic fibrosis, spinal muscular atrophy, and others, is now available.      The patient has declined the carrier screening options reviewed today.       Risk Assessment for Chromosome Conditions:   We explained that the risk for fetal chromosome abnormalities increases with maternal age. We discussed specific features of common chromosome abnormalities, including Down syndrome, trisomy 13, trisomy 18, and sex chromosome trisomies.      - At age 35 at midtrimester, the risk to have a baby with Down syndrome is 1 in 168.     - At age 35 at midtrimester, the risk to have a baby with any chromosome abnormality is 1 in 82.          Testing Options:   We discussed the following options:   Non-invasive Prenatal Testing (NIPT)    Maternal plasma cell-free DNA testing; first trimester ultrasound with nuchal translucency and nasal bone assessment is recommended, when appropriate    Screens for fetal trisomy 21, trisomy 13, trisomy 18, and sex chromosome aneuploidy    Cannot screen for open neural tube defects; maternal serum AFP after 15 weeks is recommended      ,  Chorionic villus sampling (CVS)    Invasive  procedure typically performed in the first trimester by which placental villi are obtained for the purpose of chromosome analysis and/or other prenatal genetic analysis    Diagnostic results; >99% sensitivity for fetal chromosome abnormalities    Cannot test for open neural tube defects; maternal serum AFP after 15 weeks is recommended  ,  Genetic Amniocentesis    Invasive procedure typically performed in the second trimester by which amniotic fluid is obtained for the purpose of chromosome analysis and/or other prenatal genetic analysis    Diagnostic results; >99% sensitivity for fetal chromosome abnormalities    AFAFP measurement tests for open neural tube defects     and  Comprehensive (Level II) ultrasound: Detailed ultrasound performed between 18-22 weeks gestation to screen for major birth defects and markers for aneuploidy.          We reviewed the benefits and limitations of this testing.  Screening tests provide a risk assessment specific to the pregnancy for certain fetal chromosome abnormalities, but cannot definitively diagnose or exclude a fetal chromosome abnormality.  Follow-up genetic counseling and consideration of diagnostic testing is recommended with any abnormal screening result.     Diagnostic tests carry inherent risks- including risk of miscarriage- that require careful consideration.  These tests can detect fetal chromosome abnormalities with greater than 99% certainty.  Results can be compromised by maternal cell contamination or mosaicism, and are limited by the resolution of cytogenetic G-banding technology.  There is no screening nor diagnostic test that can detect all forms of birth defects or mental disability.     It was a pleasure to be involved with [facility]. Face-to-face time of the meeting was 40 minutes.      Stevo Morse MS, [facility]  Licensed Genetic Counselor  Phone: [phone]  Pager: [phone]

## 2021-06-15 NOTE — PROGRESS NOTES
"Please see \"Imaging\" tab under Chart Review for full report.  This ultrasound was performed in the [facility], and may be located under Care Everywhere.    Niurka Cervantes MD  Maternal Fetal Medicine    "

## 2021-06-15 NOTE — TELEPHONE ENCOUNTER
2/17/2021       Called Meagan to discuss NIPT results.  Results came back negative for chromosome abnormalities in chromosomes 21, 18, & 13, as well as the sex chromosomes.  These test results do not definitively rule out the possibility of one of these conditions, but they do greatly reduce the likelihood.  Meagan declined to learn fetal sex chromosomes indicated by testing and understands this information is available if desired.  Meagan had no questions at this time and was encouraged to reach out if she has any questions or concerns in the future.       Stevo Morse MS, [facility]  Licensed Genetic Counselor  Phone: [phone]  Pager: [phone]

## 2021-06-15 NOTE — PROGRESS NOTES
Clinic Administered Medication Documentation      Injectable Medication Documentation    Patient was given tdap. Prior to medication administration, verified patients identity using patient s name and date of birth. Please see MAR and medication order for additional information. Patient instructed to remain in clinic for 15 minutes.      Was entire vial of medication used? Yes  Vial/Syringe: Syringe  Expiration Date:  1/28/2023  Was this medication supplied by the patient? No    
Doing well.   Fetal echo was normal, had a good experience.   TDaP today. Rh positive  Good fetal movement.   Started birth classes. Having more anxiety at night - about things in general. Discussed.  RTC 3 weeks  
WDL

## 2021-06-16 NOTE — PROGRESS NOTES
"Please see \"Imaging\" tab under Chart Review for full report.  This ultrasound was performed in the [facility], and may be located under Care Everywhere.    Niurka Cervantes MD  Maternal Fetal Medicine    "

## 2021-06-17 NOTE — PROGRESS NOTES
"Please see the \"Imaging\" tab for details of today's ultrasound.    Brianna Villalobos MD  Specialist in Maternal-Fetal Medicine    "

## 2021-07-08 ENCOUNTER — PRENATAL OFFICE VISIT (OUTPATIENT)
Dept: OBGYN | Facility: CLINIC | Age: 36
End: 2021-07-08
Payer: COMMERCIAL

## 2021-07-08 VITALS
WEIGHT: 163.6 LBS | HEIGHT: 63 IN | SYSTOLIC BLOOD PRESSURE: 117 MMHG | DIASTOLIC BLOOD PRESSURE: 75 MMHG | BODY MASS INDEX: 28.99 KG/M2 | TEMPERATURE: 97.8 F | HEART RATE: 81 BPM

## 2021-07-08 DIAGNOSIS — O09.513 ENCOUNTER FOR SUPERVISION OF PRIMIGRAVIDA OF ADVANCED MATERNAL AGE IN THIRD TRIMESTER: Primary | ICD-10-CM

## 2021-07-08 DIAGNOSIS — D64.9 LOW HEMOGLOBIN: ICD-10-CM

## 2021-07-08 LAB
ERYTHROCYTE [DISTWIDTH] IN BLOOD BY AUTOMATED COUNT: 12.8 % (ref 10–15)
HCT VFR BLD AUTO: 34.7 % (ref 35–47)
HGB BLD-MCNC: 11.5 G/DL (ref 11.7–15.7)
MCH RBC QN AUTO: 30.3 PG (ref 26.5–33)
MCHC RBC AUTO-ENTMCNC: 33.1 G/DL (ref 31.5–36.5)
MCV RBC AUTO: 92 FL (ref 78–100)
PLATELET # BLD AUTO: 248 10E9/L (ref 150–450)
RBC # BLD AUTO: 3.79 10E12/L (ref 3.8–5.2)
WBC # BLD AUTO: 11.7 10E9/L (ref 4–11)

## 2021-07-08 PROCEDURE — 82728 ASSAY OF FERRITIN: CPT | Performed by: OBSTETRICS & GYNECOLOGY

## 2021-07-08 PROCEDURE — 36415 COLL VENOUS BLD VENIPUNCTURE: CPT | Performed by: OBSTETRICS & GYNECOLOGY

## 2021-07-08 PROCEDURE — 86803 HEPATITIS C AB TEST: CPT | Performed by: OBSTETRICS & GYNECOLOGY

## 2021-07-08 PROCEDURE — 85027 COMPLETE CBC AUTOMATED: CPT | Performed by: OBSTETRICS & GYNECOLOGY

## 2021-07-08 PROCEDURE — 99207 PR PRENATAL VISIT: CPT | Performed by: OBSTETRICS & GYNECOLOGY

## 2021-07-08 ASSESSMENT — MIFFLIN-ST. JEOR: SCORE: 1395.17

## 2021-07-08 NOTE — PROGRESS NOTES
Doing well.   Good fetal movement.   Hep C, CBC, ferritin today.   Discussed potential for induction at 39 weeks due to AMA.   RTC 2 weeks.

## 2021-07-09 LAB
FERRITIN SERPL-MCNC: 31 NG/ML (ref 12–150)
HCV AB SERPL QL IA: NONREACTIVE

## 2021-07-16 ENCOUNTER — MYC MEDICAL ADVICE (OUTPATIENT)
Dept: OBGYN | Facility: CLINIC | Age: 36
End: 2021-07-16

## 2021-07-16 RX ORDER — BREAST PUMP
1 EACH MISCELLANEOUS DAILY PRN
Qty: 1 EACH | Refills: 0 | Status: SHIPPED | OUTPATIENT
Start: 2021-07-16 | End: 2021-08-05

## 2021-07-20 ENCOUNTER — PRENATAL OFFICE VISIT (OUTPATIENT)
Dept: OBGYN | Facility: CLINIC | Age: 36
End: 2021-07-20
Payer: COMMERCIAL

## 2021-07-20 VITALS
WEIGHT: 167 LBS | BODY MASS INDEX: 29.59 KG/M2 | DIASTOLIC BLOOD PRESSURE: 77 MMHG | HEART RATE: 88 BPM | HEIGHT: 63 IN | SYSTOLIC BLOOD PRESSURE: 127 MMHG

## 2021-07-20 DIAGNOSIS — O09.513 ENCOUNTER FOR SUPERVISION OF PRIMIGRAVIDA OF ADVANCED MATERNAL AGE IN THIRD TRIMESTER: Primary | ICD-10-CM

## 2021-07-20 PROCEDURE — 99207 PR PRENATAL VISIT: CPT | Performed by: OBSTETRICS & GYNECOLOGY

## 2021-07-20 ASSESSMENT — MIFFLIN-ST. JEOR: SCORE: 1410.6

## 2021-07-20 NOTE — PROGRESS NOTES
Doing well.   Good fetal movement.   Baseline again 125, acceleration audible.   RTC 2 weeks, GBS next visit with CBC

## 2021-08-05 ENCOUNTER — PRENATAL OFFICE VISIT (OUTPATIENT)
Dept: OBGYN | Facility: CLINIC | Age: 36
End: 2021-08-05
Payer: COMMERCIAL

## 2021-08-05 VITALS
TEMPERATURE: 97.6 F | BODY MASS INDEX: 29.91 KG/M2 | HEART RATE: 89 BPM | DIASTOLIC BLOOD PRESSURE: 77 MMHG | HEIGHT: 63 IN | SYSTOLIC BLOOD PRESSURE: 123 MMHG | WEIGHT: 168.8 LBS

## 2021-08-05 DIAGNOSIS — O09.513 ENCOUNTER FOR SUPERVISION OF PRIMIGRAVIDA OF ADVANCED MATERNAL AGE IN THIRD TRIMESTER: Primary | ICD-10-CM

## 2021-08-05 LAB
ERYTHROCYTE [DISTWIDTH] IN BLOOD BY AUTOMATED COUNT: 13 % (ref 10–15)
HCT VFR BLD AUTO: 35.7 % (ref 35–47)
HGB BLD-MCNC: 11.9 G/DL (ref 11.7–15.7)
MCH RBC QN AUTO: 30.4 PG (ref 26.5–33)
MCHC RBC AUTO-ENTMCNC: 33.3 G/DL (ref 31.5–36.5)
MCV RBC AUTO: 91 FL (ref 78–100)
PLATELET # BLD AUTO: 250 10E3/UL (ref 150–450)
RBC # BLD AUTO: 3.91 10E6/UL (ref 3.8–5.2)
WBC # BLD AUTO: 9.3 10E3/UL (ref 4–11)

## 2021-08-05 PROCEDURE — 85027 COMPLETE CBC AUTOMATED: CPT | Performed by: OBSTETRICS & GYNECOLOGY

## 2021-08-05 PROCEDURE — 36415 COLL VENOUS BLD VENIPUNCTURE: CPT | Performed by: OBSTETRICS & GYNECOLOGY

## 2021-08-05 PROCEDURE — 99207 PR PRENATAL VISIT: CPT | Performed by: OBSTETRICS & GYNECOLOGY

## 2021-08-05 PROCEDURE — 87653 STREP B DNA AMP PROBE: CPT | Performed by: OBSTETRICS & GYNECOLOGY

## 2021-08-05 ASSESSMENT — PATIENT HEALTH QUESTIONNAIRE - PHQ9
5. POOR APPETITE OR OVEREATING: NOT AT ALL
SUM OF ALL RESPONSES TO PHQ QUESTIONS 1-9: 2

## 2021-08-05 ASSESSMENT — ANXIETY QUESTIONNAIRES
5. BEING SO RESTLESS THAT IT IS HARD TO SIT STILL: NOT AT ALL
IF YOU CHECKED OFF ANY PROBLEMS ON THIS QUESTIONNAIRE, HOW DIFFICULT HAVE THESE PROBLEMS MADE IT FOR YOU TO DO YOUR WORK, TAKE CARE OF THINGS AT HOME, OR GET ALONG WITH OTHER PEOPLE: NOT DIFFICULT AT ALL
2. NOT BEING ABLE TO STOP OR CONTROL WORRYING: NOT AT ALL
3. WORRYING TOO MUCH ABOUT DIFFERENT THINGS: NOT AT ALL
GAD7 TOTAL SCORE: 1
6. BECOMING EASILY ANNOYED OR IRRITABLE: SEVERAL DAYS
7. FEELING AFRAID AS IF SOMETHING AWFUL MIGHT HAPPEN: NOT AT ALL
1. FEELING NERVOUS, ANXIOUS, OR ON EDGE: NOT AT ALL

## 2021-08-05 ASSESSMENT — MIFFLIN-ST. JEOR: SCORE: 1418.76

## 2021-08-06 ASSESSMENT — ANXIETY QUESTIONNAIRES: GAD7 TOTAL SCORE: 1

## 2021-08-07 LAB
GP B STREP DNA SPEC QL NAA+PROBE: NEGATIVE
PATIENT PENICILLIN, AMOXICILLIN, CEPHALOSPORINS ALLERGY: NO

## 2021-08-12 ENCOUNTER — PRENATAL OFFICE VISIT (OUTPATIENT)
Dept: OBGYN | Facility: CLINIC | Age: 36
End: 2021-08-12
Payer: COMMERCIAL

## 2021-08-12 VITALS
DIASTOLIC BLOOD PRESSURE: 80 MMHG | HEIGHT: 63 IN | BODY MASS INDEX: 29.91 KG/M2 | HEART RATE: 84 BPM | SYSTOLIC BLOOD PRESSURE: 127 MMHG | TEMPERATURE: 97.2 F | WEIGHT: 168.8 LBS

## 2021-08-12 DIAGNOSIS — O09.513 ENCOUNTER FOR SUPERVISION OF PRIMIGRAVIDA OF ADVANCED MATERNAL AGE IN THIRD TRIMESTER: Primary | ICD-10-CM

## 2021-08-12 PROCEDURE — 99207 PR PRENATAL VISIT: CPT | Performed by: OBSTETRICS & GYNECOLOGY

## 2021-08-12 RX ORDER — OXYTOCIN/0.9 % SODIUM CHLORIDE 30/500 ML
340 PLASTIC BAG, INJECTION (ML) INTRAVENOUS CONTINUOUS PRN
Status: CANCELLED | OUTPATIENT
Start: 2021-08-12

## 2021-08-12 RX ORDER — KETOROLAC TROMETHAMINE 30 MG/ML
30 INJECTION, SOLUTION INTRAMUSCULAR; INTRAVENOUS
Status: CANCELLED | OUTPATIENT
Start: 2021-08-12 | End: 2021-08-17

## 2021-08-12 RX ORDER — METHYLERGONOVINE MALEATE 0.2 MG/ML
200 INJECTION INTRAVENOUS
Status: CANCELLED | OUTPATIENT
Start: 2021-08-12

## 2021-08-12 RX ORDER — ONDANSETRON 4 MG/1
4 TABLET, ORALLY DISINTEGRATING ORAL EVERY 6 HOURS PRN
Status: CANCELLED | OUTPATIENT
Start: 2021-08-12

## 2021-08-12 RX ORDER — NALOXONE HYDROCHLORIDE 0.4 MG/ML
0.2 INJECTION, SOLUTION INTRAMUSCULAR; INTRAVENOUS; SUBCUTANEOUS
Status: CANCELLED | OUTPATIENT
Start: 2021-08-12

## 2021-08-12 RX ORDER — METOCLOPRAMIDE HYDROCHLORIDE 5 MG/ML
10 INJECTION INTRAMUSCULAR; INTRAVENOUS EVERY 6 HOURS PRN
Status: CANCELLED | OUTPATIENT
Start: 2021-08-12

## 2021-08-12 RX ORDER — ONDANSETRON 2 MG/ML
4 INJECTION INTRAMUSCULAR; INTRAVENOUS EVERY 6 HOURS PRN
Status: CANCELLED | OUTPATIENT
Start: 2021-08-12

## 2021-08-12 RX ORDER — MISOPROSTOL 100 UG/1
400 TABLET ORAL
Status: CANCELLED | OUTPATIENT
Start: 2021-08-12

## 2021-08-12 RX ORDER — FENTANYL CITRATE 50 UG/ML
50-100 INJECTION, SOLUTION INTRAMUSCULAR; INTRAVENOUS
Status: CANCELLED | OUTPATIENT
Start: 2021-08-12

## 2021-08-12 RX ORDER — PROCHLORPERAZINE 25 MG
25 SUPPOSITORY, RECTAL RECTAL EVERY 12 HOURS PRN
Status: CANCELLED | OUTPATIENT
Start: 2021-08-12

## 2021-08-12 RX ORDER — ACETAMINOPHEN 325 MG/1
650 TABLET ORAL EVERY 6 HOURS PRN
COMMUNITY
Start: 2021-08-12 | End: 2022-06-20

## 2021-08-12 RX ORDER — OXYTOCIN 10 [USP'U]/ML
10 INJECTION, SOLUTION INTRAMUSCULAR; INTRAVENOUS
Status: CANCELLED | OUTPATIENT
Start: 2021-08-12

## 2021-08-12 RX ORDER — SODIUM CHLORIDE, SODIUM LACTATE, POTASSIUM CHLORIDE, CALCIUM CHLORIDE 600; 310; 30; 20 MG/100ML; MG/100ML; MG/100ML; MG/100ML
INJECTION, SOLUTION INTRAVENOUS CONTINUOUS
Status: CANCELLED | OUTPATIENT
Start: 2021-08-12

## 2021-08-12 RX ORDER — CARBOPROST TROMETHAMINE 250 UG/ML
250 INJECTION, SOLUTION INTRAMUSCULAR
Status: CANCELLED | OUTPATIENT
Start: 2021-08-12

## 2021-08-12 RX ORDER — ACETAMINOPHEN 325 MG/1
650 TABLET ORAL EVERY 4 HOURS PRN
Status: CANCELLED | OUTPATIENT
Start: 2021-08-12

## 2021-08-12 RX ORDER — NALOXONE HYDROCHLORIDE 0.4 MG/ML
0.4 INJECTION, SOLUTION INTRAMUSCULAR; INTRAVENOUS; SUBCUTANEOUS
Status: CANCELLED | OUTPATIENT
Start: 2021-08-12

## 2021-08-12 RX ORDER — OXYTOCIN/0.9 % SODIUM CHLORIDE 30/500 ML
100-340 PLASTIC BAG, INJECTION (ML) INTRAVENOUS CONTINUOUS PRN
Status: CANCELLED | OUTPATIENT
Start: 2021-08-12

## 2021-08-12 RX ORDER — MISOPROSTOL 200 UG/1
800 TABLET ORAL
Status: CANCELLED | OUTPATIENT
Start: 2021-08-12

## 2021-08-12 RX ORDER — METOCLOPRAMIDE 5 MG/1
10 TABLET ORAL EVERY 6 HOURS PRN
Status: CANCELLED | OUTPATIENT
Start: 2021-08-12

## 2021-08-12 RX ORDER — LIDOCAINE 40 MG/G
CREAM TOPICAL
Status: CANCELLED | OUTPATIENT
Start: 2021-08-12

## 2021-08-12 RX ORDER — PROCHLORPERAZINE MALEATE 5 MG
10 TABLET ORAL EVERY 6 HOURS PRN
Status: CANCELLED | OUTPATIENT
Start: 2021-08-12

## 2021-08-12 RX ORDER — IBUPROFEN 200 MG
600 TABLET ORAL EVERY 6 HOURS PRN
COMMUNITY
Start: 2021-08-12 | End: 2022-05-12

## 2021-08-12 RX ORDER — AMOXICILLIN 250 MG
1 CAPSULE ORAL DAILY
COMMUNITY
Start: 2021-08-12 | End: 2021-10-07

## 2021-08-12 ASSESSMENT — MIFFLIN-ST. JEOR: SCORE: 1418.76

## 2021-08-12 NOTE — PROGRESS NOTES
GBS: Negative  Hemoglobin   Date Value Ref Range Status   08/05/2021 11.9 11.7 - 15.7 g/dL Final   07/08/2021 11.5 (L) 11.7 - 15.7 g/dL Final   ]    Breast pump rx: has already  Labor orders: signed and held  Birth plan: open to everything, plans epidural  Length of stay: discussed  Disability paperwork: will bring next  Resident involvement: discussed and agrees.  PP meds OTC    Good fetal movement.  Thinking about cervical exam and scheduling IOL next week.

## 2021-08-17 ENCOUNTER — PRENATAL OFFICE VISIT (OUTPATIENT)
Dept: OBGYN | Facility: CLINIC | Age: 36
End: 2021-08-17
Payer: COMMERCIAL

## 2021-08-17 VITALS
HEART RATE: 91 BPM | DIASTOLIC BLOOD PRESSURE: 81 MMHG | SYSTOLIC BLOOD PRESSURE: 138 MMHG | WEIGHT: 169.2 LBS | BODY MASS INDEX: 29.98 KG/M2 | HEIGHT: 63 IN | TEMPERATURE: 97.3 F

## 2021-08-17 DIAGNOSIS — O09.513 ENCOUNTER FOR SUPERVISION OF PRIMIGRAVIDA OF ADVANCED MATERNAL AGE IN THIRD TRIMESTER: Primary | ICD-10-CM

## 2021-08-17 DIAGNOSIS — Z11.59 SCREENING FOR VIRAL DISEASE: ICD-10-CM

## 2021-08-17 PROCEDURE — 99207 PR PRENATAL VISIT: CPT | Performed by: OBSTETRICS & GYNECOLOGY

## 2021-08-17 ASSESSMENT — MIFFLIN-ST. JEOR: SCORE: 1420.58

## 2021-08-17 NOTE — Clinical Note
Aram wood,   This super awesome patient of mine is coming in Monday 8/23 at 6 pm for IOL for ama. She want to do initial cervical ripening overnight. Discussed all the methods. Might want just vaginal miso first - open to mckeon if needed.     [place]

## 2021-08-19 NOTE — PROGRESS NOTES
Doing well.  Good fetal movement.  Today has questions about induction of labor secondary to advanced maternal age.  She has read some studies on this topic on her own and is interested in induction of labor.  Discussed induction process, risks and benefits, typical timeframe.  Noted that blood pressure is elevated from her previous baseline however not above the threshold for diagnosis of gestational hypertension.  Induction scheduled, would like to do cervical ripening overnight.

## 2021-08-20 ENCOUNTER — ALLIED HEALTH/NURSE VISIT (OUTPATIENT)
Dept: FAMILY MEDICINE | Facility: CLINIC | Age: 36
End: 2021-08-20
Payer: COMMERCIAL

## 2021-08-20 ENCOUNTER — LAB (OUTPATIENT)
Dept: LAB | Facility: CLINIC | Age: 36
End: 2021-08-20
Attending: OBSTETRICS & GYNECOLOGY
Payer: COMMERCIAL

## 2021-08-20 VITALS — SYSTOLIC BLOOD PRESSURE: 133 MMHG | HEART RATE: 90 BPM | DIASTOLIC BLOOD PRESSURE: 85 MMHG

## 2021-08-20 DIAGNOSIS — O09.513 ENCOUNTER FOR SUPERVISION OF PRIMIGRAVIDA OF ADVANCED MATERNAL AGE IN THIRD TRIMESTER: ICD-10-CM

## 2021-08-20 DIAGNOSIS — R03.0 ELEVATED BLOOD PRESSURE READING WITHOUT DIAGNOSIS OF HYPERTENSION: Primary | ICD-10-CM

## 2021-08-20 PROCEDURE — U0003 INFECTIOUS AGENT DETECTION BY NUCLEIC ACID (DNA OR RNA); SEVERE ACUTE RESPIRATORY SYNDROME CORONAVIRUS 2 (SARS-COV-2) (CORONAVIRUS DISEASE [COVID-19]), AMPLIFIED PROBE TECHNIQUE, MAKING USE OF HIGH THROUGHPUT TECHNOLOGIES AS DESCRIBED BY CMS-2020-01-R: HCPCS

## 2021-08-20 PROCEDURE — U0005 INFEC AGEN DETEC AMPLI PROBE: HCPCS

## 2021-08-20 NOTE — PROGRESS NOTES
Follow Up Blood Pressure Check    Meagan Monroy is a 36 year old female recommended to follow up for blood pressure check by Dr. Lira. Anihypertensive medications and adherence were verified: Yes.     Reason for visit: elevated bp    Medication change at last visit: none    Today's Vitals:   Vitals:    08/20/21 1148   BP: 133/85   Pulse: 90       Home blood pressure readings brought in today:   no    Lowest blood pressure today is less than 140/90 and they deny signs or symptoms of new onset: severe headache, fatigue, confusion, vision changes, chest pain, pounding in the chest, neck, ears, irregular heartbeat, difficulty breathing and blood in the urine.  Please inform patient of his/her blood pressure today.  If they are asymptomatic, the patient is to continue current medications.  This message will be routed to their provider, and they will be notified if a change in medication is recommended.    ( may be deleted if not applicable) If lowest blood pressure is greater than 200/110, regardless if symptoms are present, patient needs to be evaluated by a provider today.    Niurka Lainez CMA    Current Outpatient Medications   Medication Sig Dispense Refill     acetaminophen (TYLENOL) 325 MG tablet Take 2 tablets (650 mg) by mouth every 6 hours as needed for mild pain Start after Delivery.       albuterol (PROAIR HFA/PROVENTIL HFA/VENTOLIN HFA) 108 (90 Base) MCG/ACT inhaler Inhale 1-2 puffs into the lungs        Ascorbic Acid (VITAMIN C PO) Take 250 mg by mouth daily       ASPIRIN 81 PO        augmented betamethasone dipropionate (DIPROLENE-AF) 0.05 % external ointment Apply a thin layer to affected skin twice a day for up to 2 weeks (avoid face, body folds)       beclomethasone HFA (QVAR REDIHALER) 40 MCG/ACT inhaler Inhale 40 mcg into the lungs        Cetirizine HCl (ZYRTEC PO)        Cyanocobalamin (VITAMIN B 12 PO) Take 1,000 mcg by mouth daily       ferrous sulfate (FEROSUL) 325 (65 Fe) MG tablet Take 325 mg  by mouth every other day       ibuprofen (ADVIL/MOTRIN) 200 MG tablet Take 3 tablets (600 mg) by mouth every 6 hours as needed for moderate pain Start after delivery       Prenatal Vit-Fe Fumarate-FA (PRENATAL MULTIVITAMIN W/IRON) 27-0.8 MG tablet Take 1 tablet by mouth daily 90 tablet 3     senna-docusate (SENOKOT-S/PERICOLACE) 8.6-50 MG tablet Take 1 tablet by mouth daily Start after delivery.

## 2021-08-21 LAB — SARS-COV-2 RNA RESP QL NAA+PROBE: NEGATIVE

## 2021-08-23 ENCOUNTER — HOSPITAL ENCOUNTER (INPATIENT)
Facility: CLINIC | Age: 36
LOS: 5 days | Discharge: HOME HEALTH | End: 2021-08-28
Attending: OBSTETRICS & GYNECOLOGY | Admitting: OBSTETRICS & GYNECOLOGY
Payer: COMMERCIAL

## 2021-08-23 DIAGNOSIS — O09.513 ENCOUNTER FOR SUPERVISION OF PRIMIGRAVIDA OF ADVANCED MATERNAL AGE IN THIRD TRIMESTER: Primary | ICD-10-CM

## 2021-08-23 DIAGNOSIS — Z98.891 S/P CESAREAN SECTION: ICD-10-CM

## 2021-08-23 LAB
ABO/RH(D): NORMAL
ALT SERPL W P-5'-P-CCNC: 18 U/L (ref 0–50)
ANTIBODY SCREEN: NEGATIVE
AST SERPL W P-5'-P-CCNC: 16 U/L (ref 0–45)
CREAT SERPL-MCNC: 0.83 MG/DL (ref 0.52–1.04)
CREAT UR-MCNC: 86 MG/DL
GFR SERPL CREATININE-BSD FRML MDRD: 90 ML/MIN/1.73M2
HGB BLD-MCNC: 12.5 G/DL (ref 11.7–15.7)
PLATELET # BLD AUTO: 245 10E3/UL (ref 150–450)
PROT UR-MCNC: 0.19 G/L
PROT/CREAT 24H UR: 0.22 G/G CR (ref 0–0.2)
SPECIMEN EXPIRATION DATE: NORMAL

## 2021-08-23 PROCEDURE — 3E0P7VZ INTRODUCTION OF HORMONE INTO FEMALE REPRODUCTIVE, VIA NATURAL OR ARTIFICIAL OPENING: ICD-10-PCS | Performed by: OBSTETRICS & GYNECOLOGY

## 2021-08-23 PROCEDURE — 84460 ALANINE AMINO (ALT) (SGPT): CPT | Performed by: STUDENT IN AN ORGANIZED HEALTH CARE EDUCATION/TRAINING PROGRAM

## 2021-08-23 PROCEDURE — 84156 ASSAY OF PROTEIN URINE: CPT | Performed by: STUDENT IN AN ORGANIZED HEALTH CARE EDUCATION/TRAINING PROGRAM

## 2021-08-23 PROCEDURE — 82565 ASSAY OF CREATININE: CPT | Performed by: STUDENT IN AN ORGANIZED HEALTH CARE EDUCATION/TRAINING PROGRAM

## 2021-08-23 PROCEDURE — 99232 SBSQ HOSP IP/OBS MODERATE 35: CPT | Mod: GC | Performed by: OBSTETRICS & GYNECOLOGY

## 2021-08-23 PROCEDURE — 85018 HEMOGLOBIN: CPT | Performed by: OBSTETRICS & GYNECOLOGY

## 2021-08-23 PROCEDURE — 85049 AUTOMATED PLATELET COUNT: CPT | Performed by: OBSTETRICS & GYNECOLOGY

## 2021-08-23 PROCEDURE — 250N000013 HC RX MED GY IP 250 OP 250 PS 637: Performed by: STUDENT IN AN ORGANIZED HEALTH CARE EDUCATION/TRAINING PROGRAM

## 2021-08-23 PROCEDURE — 36415 COLL VENOUS BLD VENIPUNCTURE: CPT | Performed by: STUDENT IN AN ORGANIZED HEALTH CARE EDUCATION/TRAINING PROGRAM

## 2021-08-23 PROCEDURE — 120N000002 HC R&B MED SURG/OB UMMC

## 2021-08-23 PROCEDURE — 86780 TREPONEMA PALLIDUM: CPT | Performed by: OBSTETRICS & GYNECOLOGY

## 2021-08-23 PROCEDURE — 86900 BLOOD TYPING SEROLOGIC ABO: CPT | Performed by: OBSTETRICS & GYNECOLOGY

## 2021-08-23 PROCEDURE — 84450 TRANSFERASE (AST) (SGOT): CPT | Performed by: STUDENT IN AN ORGANIZED HEALTH CARE EDUCATION/TRAINING PROGRAM

## 2021-08-23 RX ORDER — ACETAMINOPHEN 325 MG/1
650 TABLET ORAL EVERY 4 HOURS PRN
Status: DISCONTINUED | OUTPATIENT
Start: 2021-08-23 | End: 2021-08-25

## 2021-08-23 RX ORDER — NALOXONE HYDROCHLORIDE 0.4 MG/ML
0.2 INJECTION, SOLUTION INTRAMUSCULAR; INTRAVENOUS; SUBCUTANEOUS
Status: DISCONTINUED | OUTPATIENT
Start: 2021-08-23 | End: 2021-08-25

## 2021-08-23 RX ORDER — NALOXONE HYDROCHLORIDE 0.4 MG/ML
0.4 INJECTION, SOLUTION INTRAMUSCULAR; INTRAVENOUS; SUBCUTANEOUS
Status: DISCONTINUED | OUTPATIENT
Start: 2021-08-23 | End: 2021-08-25

## 2021-08-23 RX ORDER — TRANEXAMIC ACID 10 MG/ML
1 INJECTION, SOLUTION INTRAVENOUS EVERY 30 MIN PRN
Status: DISCONTINUED | OUTPATIENT
Start: 2021-08-23 | End: 2021-08-25

## 2021-08-23 RX ORDER — ONDANSETRON 2 MG/ML
4 INJECTION INTRAMUSCULAR; INTRAVENOUS EVERY 6 HOURS PRN
Status: DISCONTINUED | OUTPATIENT
Start: 2021-08-23 | End: 2021-08-25

## 2021-08-23 RX ORDER — PROCHLORPERAZINE MALEATE 10 MG
10 TABLET ORAL EVERY 6 HOURS PRN
Status: DISCONTINUED | OUTPATIENT
Start: 2021-08-23 | End: 2021-08-25

## 2021-08-23 RX ORDER — ONDANSETRON 4 MG/1
4 TABLET, ORALLY DISINTEGRATING ORAL EVERY 6 HOURS PRN
Status: DISCONTINUED | OUTPATIENT
Start: 2021-08-23 | End: 2021-08-25

## 2021-08-23 RX ORDER — MORPHINE SULFATE 10 MG/ML
10 INJECTION, SOLUTION INTRAMUSCULAR; INTRAVENOUS ONCE
Status: COMPLETED | OUTPATIENT
Start: 2021-08-23 | End: 2021-08-24

## 2021-08-23 RX ORDER — MISOPROSTOL 200 UG/1
400 TABLET ORAL
Status: DISCONTINUED | OUTPATIENT
Start: 2021-08-23 | End: 2021-08-25

## 2021-08-23 RX ORDER — OXYTOCIN 10 [USP'U]/ML
10 INJECTION, SOLUTION INTRAMUSCULAR; INTRAVENOUS
Status: DISCONTINUED | OUTPATIENT
Start: 2021-08-23 | End: 2021-08-25

## 2021-08-23 RX ORDER — MISOPROSTOL 100 UG/1
25 TABLET ORAL EVERY 4 HOURS PRN
Status: DISCONTINUED | OUTPATIENT
Start: 2021-08-23 | End: 2021-08-25 | Stop reason: HOSPADM

## 2021-08-23 RX ORDER — CALCIUM CARBONATE 500 MG/1
500-1000 TABLET, CHEWABLE ORAL
Status: DISCONTINUED | OUTPATIENT
Start: 2021-08-23 | End: 2021-08-28 | Stop reason: HOSPADM

## 2021-08-23 RX ORDER — HYDROXYZINE HYDROCHLORIDE 50 MG/1
50-100 TABLET, FILM COATED ORAL ONCE
Status: COMPLETED | OUTPATIENT
Start: 2021-08-24 | End: 2021-08-24

## 2021-08-23 RX ORDER — MISOPROSTOL 200 UG/1
800 TABLET ORAL
Status: DISCONTINUED | OUTPATIENT
Start: 2021-08-23 | End: 2021-08-25

## 2021-08-23 RX ORDER — SODIUM CHLORIDE, SODIUM LACTATE, POTASSIUM CHLORIDE, CALCIUM CHLORIDE 600; 310; 30; 20 MG/100ML; MG/100ML; MG/100ML; MG/100ML
INJECTION, SOLUTION INTRAVENOUS CONTINUOUS
Status: DISCONTINUED | OUTPATIENT
Start: 2021-08-23 | End: 2021-08-25

## 2021-08-23 RX ORDER — METHYLERGONOVINE MALEATE 0.2 MG/ML
200 INJECTION INTRAVENOUS
Status: DISCONTINUED | OUTPATIENT
Start: 2021-08-23 | End: 2021-08-25

## 2021-08-23 RX ORDER — LIDOCAINE 40 MG/G
CREAM TOPICAL
Status: DISCONTINUED | OUTPATIENT
Start: 2021-08-23 | End: 2021-08-25

## 2021-08-23 RX ORDER — FENTANYL CITRATE 50 UG/ML
50-100 INJECTION, SOLUTION INTRAMUSCULAR; INTRAVENOUS
Status: DISCONTINUED | OUTPATIENT
Start: 2021-08-23 | End: 2021-08-25

## 2021-08-23 RX ORDER — CARBOPROST TROMETHAMINE 250 UG/ML
250 INJECTION, SOLUTION INTRAMUSCULAR
Status: DISCONTINUED | OUTPATIENT
Start: 2021-08-23 | End: 2021-08-25

## 2021-08-23 RX ORDER — METOCLOPRAMIDE 10 MG/1
10 TABLET ORAL EVERY 6 HOURS PRN
Status: DISCONTINUED | OUTPATIENT
Start: 2021-08-23 | End: 2021-08-25

## 2021-08-23 RX ORDER — METOCLOPRAMIDE HYDROCHLORIDE 5 MG/ML
10 INJECTION INTRAMUSCULAR; INTRAVENOUS EVERY 6 HOURS PRN
Status: DISCONTINUED | OUTPATIENT
Start: 2021-08-23 | End: 2021-08-25

## 2021-08-23 RX ORDER — PROCHLORPERAZINE 25 MG
25 SUPPOSITORY, RECTAL RECTAL EVERY 12 HOURS PRN
Status: DISCONTINUED | OUTPATIENT
Start: 2021-08-23 | End: 2021-08-25

## 2021-08-23 RX ORDER — OXYTOCIN/0.9 % SODIUM CHLORIDE 30/500 ML
340 PLASTIC BAG, INJECTION (ML) INTRAVENOUS CONTINUOUS PRN
Status: COMPLETED | OUTPATIENT
Start: 2021-08-23 | End: 2021-08-25

## 2021-08-23 RX ADMIN — Medication 25 MCG: at 19:44

## 2021-08-24 ENCOUNTER — ANESTHESIA EVENT (OUTPATIENT)
Dept: OBGYN | Facility: CLINIC | Age: 36
End: 2021-08-24
Payer: COMMERCIAL

## 2021-08-24 ENCOUNTER — ANESTHESIA (OUTPATIENT)
Dept: OBGYN | Facility: CLINIC | Age: 36
End: 2021-08-24
Payer: COMMERCIAL

## 2021-08-24 LAB
ALT SERPL W P-5'-P-CCNC: 17 U/L (ref 0–50)
AST SERPL W P-5'-P-CCNC: 19 U/L (ref 0–45)
CREAT SERPL-MCNC: 0.61 MG/DL (ref 0.52–1.04)
ERYTHROCYTE [DISTWIDTH] IN BLOOD BY AUTOMATED COUNT: 12.4 % (ref 10–15)
GFR SERPL CREATININE-BSD FRML MDRD: >90 ML/MIN/1.73M2
HCT VFR BLD AUTO: 40 % (ref 35–47)
HGB BLD-MCNC: 13.3 G/DL (ref 11.7–15.7)
MCH RBC QN AUTO: 29.6 PG (ref 26.5–33)
MCHC RBC AUTO-ENTMCNC: 33.3 G/DL (ref 31.5–36.5)
MCV RBC AUTO: 89 FL (ref 78–100)
PLATELET # BLD AUTO: 238 10E3/UL (ref 150–450)
RBC # BLD AUTO: 4.5 10E6/UL (ref 3.8–5.2)
T PALLIDUM AB SER QL: NONREACTIVE
WBC # BLD AUTO: 13.8 10E3/UL (ref 4–11)

## 2021-08-24 PROCEDURE — 258N000003 HC RX IP 258 OP 636: Performed by: OBSTETRICS & GYNECOLOGY

## 2021-08-24 PROCEDURE — 250N000011 HC RX IP 250 OP 636: Performed by: STUDENT IN AN ORGANIZED HEALTH CARE EDUCATION/TRAINING PROGRAM

## 2021-08-24 PROCEDURE — 84450 TRANSFERASE (AST) (SGOT): CPT | Performed by: OBSTETRICS & GYNECOLOGY

## 2021-08-24 PROCEDURE — 3E0R3BZ INTRODUCTION OF ANESTHETIC AGENT INTO SPINAL CANAL, PERCUTANEOUS APPROACH: ICD-10-PCS | Performed by: ANESTHESIOLOGY

## 2021-08-24 PROCEDURE — 250N000011 HC RX IP 250 OP 636: Performed by: ANESTHESIOLOGY

## 2021-08-24 PROCEDURE — 82565 ASSAY OF CREATININE: CPT | Performed by: OBSTETRICS & GYNECOLOGY

## 2021-08-24 PROCEDURE — 85027 COMPLETE CBC AUTOMATED: CPT | Performed by: OBSTETRICS & GYNECOLOGY

## 2021-08-24 PROCEDURE — 250N000013 HC RX MED GY IP 250 OP 250 PS 637: Performed by: STUDENT IN AN ORGANIZED HEALTH CARE EDUCATION/TRAINING PROGRAM

## 2021-08-24 PROCEDURE — 370N000003 HC ANESTHESIA WARD SERVICE

## 2021-08-24 PROCEDURE — 59200 INSERT CERVICAL DILATOR: CPT | Mod: GC | Performed by: OBSTETRICS & GYNECOLOGY

## 2021-08-24 PROCEDURE — 84460 ALANINE AMINO (ALT) (SGPT): CPT | Performed by: OBSTETRICS & GYNECOLOGY

## 2021-08-24 PROCEDURE — 120N000002 HC R&B MED SURG/OB UMMC

## 2021-08-24 PROCEDURE — 36415 COLL VENOUS BLD VENIPUNCTURE: CPT | Performed by: OBSTETRICS & GYNECOLOGY

## 2021-08-24 PROCEDURE — 250N000009 HC RX 250: Performed by: ANESTHESIOLOGY

## 2021-08-24 PROCEDURE — 00HU33Z INSERTION OF INFUSION DEVICE INTO SPINAL CANAL, PERCUTANEOUS APPROACH: ICD-10-PCS | Performed by: ANESTHESIOLOGY

## 2021-08-24 PROCEDURE — 250N000009 HC RX 250: Performed by: OBSTETRICS & GYNECOLOGY

## 2021-08-24 PROCEDURE — 258N000003 HC RX IP 258 OP 636: Performed by: STUDENT IN AN ORGANIZED HEALTH CARE EDUCATION/TRAINING PROGRAM

## 2021-08-24 RX ORDER — OXYTOCIN/0.9 % SODIUM CHLORIDE 30/500 ML
PLASTIC BAG, INJECTION (ML) INTRAVENOUS
Status: DISCONTINUED
Start: 2021-08-24 | End: 2021-08-25 | Stop reason: HOSPADM

## 2021-08-24 RX ORDER — LIDOCAINE HYDROCHLORIDE 10 MG/ML
INJECTION, SOLUTION EPIDURAL; INFILTRATION; INTRACAUDAL; PERINEURAL
Status: DISCONTINUED
Start: 2021-08-24 | End: 2021-08-25 | Stop reason: HOSPADM

## 2021-08-24 RX ORDER — OXYTOCIN 10 [USP'U]/ML
INJECTION, SOLUTION INTRAMUSCULAR; INTRAVENOUS
Status: DISCONTINUED
Start: 2021-08-24 | End: 2021-08-25 | Stop reason: HOSPADM

## 2021-08-24 RX ORDER — FENTANYL/BUPIVACAINE/NS/PF 2-1250MCG
PLASTIC BAG, INJECTION (ML) INJECTION
Status: DISCONTINUED
Start: 2021-08-24 | End: 2021-08-25 | Stop reason: HOSPADM

## 2021-08-24 RX ORDER — NALBUPHINE HYDROCHLORIDE 10 MG/ML
2.5-5 INJECTION, SOLUTION INTRAMUSCULAR; INTRAVENOUS; SUBCUTANEOUS EVERY 6 HOURS PRN
Status: DISCONTINUED | OUTPATIENT
Start: 2021-08-24 | End: 2021-08-25

## 2021-08-24 RX ORDER — NALBUPHINE HYDROCHLORIDE 10 MG/ML
2.5-5 INJECTION, SOLUTION INTRAMUSCULAR; INTRAVENOUS; SUBCUTANEOUS EVERY 6 HOURS PRN
Status: DISCONTINUED | OUTPATIENT
Start: 2021-08-24 | End: 2021-08-24

## 2021-08-24 RX ORDER — LIDOCAINE HYDROCHLORIDE AND EPINEPHRINE 15; 5 MG/ML; UG/ML
INJECTION, SOLUTION EPIDURAL PRN
Status: DISCONTINUED | OUTPATIENT
Start: 2021-08-24 | End: 2021-08-24

## 2021-08-24 RX ORDER — MISOPROSTOL 200 UG/1
TABLET ORAL
Status: DISCONTINUED
Start: 2021-08-24 | End: 2021-08-25 | Stop reason: HOSPADM

## 2021-08-24 RX ORDER — FENTANYL CITRATE-0.9 % NACL/PF 10 MCG/ML
100 PLASTIC BAG, INJECTION (ML) INTRAVENOUS EVERY 5 MIN PRN
Status: DISCONTINUED | OUTPATIENT
Start: 2021-08-24 | End: 2021-08-25 | Stop reason: HOSPADM

## 2021-08-24 RX ORDER — TERBUTALINE SULFATE 1 MG/ML
0.25 INJECTION, SOLUTION SUBCUTANEOUS
Status: DISCONTINUED | OUTPATIENT
Start: 2021-08-24 | End: 2021-08-25 | Stop reason: HOSPADM

## 2021-08-24 RX ORDER — LIDOCAINE 40 MG/G
CREAM TOPICAL
Status: DISCONTINUED | OUTPATIENT
Start: 2021-08-24 | End: 2021-08-25 | Stop reason: HOSPADM

## 2021-08-24 RX ORDER — OXYTOCIN/0.9 % SODIUM CHLORIDE 30/500 ML
1-24 PLASTIC BAG, INJECTION (ML) INTRAVENOUS CONTINUOUS
Status: DISCONTINUED | OUTPATIENT
Start: 2021-08-24 | End: 2021-08-25 | Stop reason: HOSPADM

## 2021-08-24 RX ORDER — SODIUM CHLORIDE, SODIUM LACTATE, POTASSIUM CHLORIDE, CALCIUM CHLORIDE 600; 310; 30; 20 MG/100ML; MG/100ML; MG/100ML; MG/100ML
INJECTION, SOLUTION INTRAVENOUS CONTINUOUS
Status: DISCONTINUED | OUTPATIENT
Start: 2021-08-24 | End: 2021-08-25 | Stop reason: HOSPADM

## 2021-08-24 RX ADMIN — ONDANSETRON 4 MG: 2 INJECTION INTRAMUSCULAR; INTRAVENOUS at 17:34

## 2021-08-24 RX ADMIN — SODIUM CHLORIDE, POTASSIUM CHLORIDE, SODIUM LACTATE AND CALCIUM CHLORIDE: 600; 310; 30; 20 INJECTION, SOLUTION INTRAVENOUS at 22:18

## 2021-08-24 RX ADMIN — Medication 25 MCG: at 04:52

## 2021-08-24 RX ADMIN — MORPHINE SULFATE 10 MG: 10 INJECTION INTRAVENOUS at 00:02

## 2021-08-24 RX ADMIN — SODIUM CHLORIDE, POTASSIUM CHLORIDE, SODIUM LACTATE AND CALCIUM CHLORIDE: 600; 310; 30; 20 INJECTION, SOLUTION INTRAVENOUS at 03:15

## 2021-08-24 RX ADMIN — CALCIUM CARBONATE (ANTACID) CHEW TAB 500 MG 500 MG: 500 CHEW TAB at 11:33

## 2021-08-24 RX ADMIN — Medication 8 ML: at 18:46

## 2021-08-24 RX ADMIN — ACETAMINOPHEN 650 MG: 325 TABLET, FILM COATED ORAL at 09:52

## 2021-08-24 RX ADMIN — Medication 1 MILLI-UNITS/MIN: at 11:29

## 2021-08-24 RX ADMIN — CALCIUM CARBONATE (ANTACID) CHEW TAB 500 MG 500 MG: 500 CHEW TAB at 00:01

## 2021-08-24 RX ADMIN — SODIUM CHLORIDE, POTASSIUM CHLORIDE, SODIUM LACTATE AND CALCIUM CHLORIDE: 600; 310; 30; 20 INJECTION, SOLUTION INTRAVENOUS at 16:35

## 2021-08-24 RX ADMIN — LIDOCAINE HYDROCHLORIDE,EPINEPHRINE BITARTRATE 3 ML: 15; .005 INJECTION, SOLUTION EPIDURAL; INFILTRATION; INTRACAUDAL; PERINEURAL at 18:43

## 2021-08-24 RX ADMIN — HYDROXYZINE HYDROCHLORIDE 100 MG: 50 TABLET ORAL at 00:02

## 2021-08-24 RX ADMIN — CALCIUM CARBONATE (ANTACID) CHEW TAB 500 MG 500 MG: 500 CHEW TAB at 16:37

## 2021-08-24 RX ADMIN — SODIUM CHLORIDE, POTASSIUM CHLORIDE, SODIUM LACTATE AND CALCIUM CHLORIDE 500 ML: 600; 310; 30; 20 INJECTION, SOLUTION INTRAVENOUS at 02:56

## 2021-08-24 RX ADMIN — SODIUM CHLORIDE, POTASSIUM CHLORIDE, SODIUM LACTATE AND CALCIUM CHLORIDE 500 ML: 600; 310; 30; 20 INJECTION, SOLUTION INTRAVENOUS at 00:08

## 2021-08-24 RX ADMIN — Medication: at 23:24

## 2021-08-24 RX ADMIN — ONDANSETRON 4 MG: 2 INJECTION INTRAMUSCULAR; INTRAVENOUS at 01:44

## 2021-08-24 NOTE — PLAN OF CARE
VSS. BP WNL. Coping with labor. Received morphine and vistaril for sleep. Miso #2 given at 0452.  Ctx are 2-5 minutes. Anticipate . Intermittent low FHR but reassuring. FHR has moderate variability with no decels. Continue with plan of care.

## 2021-08-24 NOTE — PROGRESS NOTES
[facility]  Strip Review Note  O:   Patient Vitals for the past 4 hrs:   Temp Temp src   21 0300 97.8  F (36.6  C) Oral     FHT: Baseline 112, moderate variability, present accelerations,  no decelerations  [place]: Not tracing well, 3-5 contractions in 10 minutes    A/P:  Ms. Meagan Monroy is a 36 year old  at 39w4d by LMP c/w 9w4d US, here for IOL for AMA.    Labor: - s/p 1 dose PV miso, balloon inserted and removed due to category II and s/p another dose PV miso.  FWB: - Category I FHT, reactive and reassuring.  PNC: - Rh pos, Rubella immune, GBS neg, , GTT 82/156/101/77 (passed), Placenta posterior    Rukhsana Chapman MD  ObGyn Resident, PGY-2  2021 6:35 AM

## 2021-08-24 NOTE — ANESTHESIA PREPROCEDURE EVALUATION
Anesthesia Pre-Procedure Evaluation    Patient: Meagan Monroy   MRN: [id number] : 1985        Preoperative Diagnosis: * No surgery found *   Procedure :      Past Medical History:   Diagnosis Date     Mild asthma       Past Surgical History:   Procedure Laterality Date     wisdom teeth        No Known Allergies   Social History     Tobacco Use     Smoking status: Never Smoker     Smokeless tobacco: Never Used   Substance Use Topics     Alcohol use: Not Currently      Wt Readings from Last 1 Encounters:   21 76.7 kg (169 lb 3.2 oz)        Anesthesia Evaluation   Pt has not had prior anesthetic         ROS/MED HX  ENT/Pulmonary:     (+) asthma     Neurologic:  - neg neurologic ROS     Cardiovascular: Comment: Gestational Hypertension - neg cardiovascular ROS     METS/Exercise Tolerance:     Hematologic:  - neg hematologic  ROS     Musculoskeletal:       GI/Hepatic:  - neg GI/hepatic ROS     Renal/Genitourinary:       Endo:  - neg endo ROS     Psychiatric/Substance Use:  - neg psychiatric ROS     Infectious Disease:       Malignancy:       Other:            Physical Exam    Airway        Mallampati: II   TM distance: > 3 FB   Neck ROM: full     Respiratory Devices and Support         Dental  no notable dental history         Cardiovascular   cardiovascular exam normal          Pulmonary   pulmonary exam normal                OUTSIDE LABS:  CBC:   Lab Results   Component Value Date    WBC 13.8 (H) 2021    WBC 9.3 2021    HGB 13.3 2021    HGB 12.5 2021    HCT 40.0 2021    HCT 35.7 2021     2021     2021     BMP:   Lab Results   Component Value Date    CR 0.61 2021    CR 0.83 2021     COAGS: No results found for: PTT, INR, FIBR  POC:   Lab Results   Component Value Date    HCG Positive (A) 2021     HEPATIC:   Lab Results   Component Value Date    ALT 17 2021    AST 19 2021     OTHER: No results found for: PH, LACT,  A1C, VERONICA, PHOS, MAG, LIPASE, AMYLASE, TSH, T4, T3, CRP, SED    Anesthesia Plan    ASA Status:  2   NPO Status:  NPO Appropriate    Anesthesia Type: Epidural.              Consents    Anesthesia Plan(s) and associated risks, benefits, and realistic alternatives discussed. Questions answered and patient/representative(s) expressed understanding.     - Discussed with:  Patient      - Extended Intubation/Ventilatory Support Discussed: No.      - Patient is DNR/DNI Status: No         Postoperative Care    Pain management: Oral pain medications.   PONV prophylaxis: Ondansetron (or other 5HT-3)     Comments:           neg OB ROS.       Radha Salgado MD

## 2021-08-24 NOTE — PROGRESS NOTES
Intrapartum Progress Note    S: Patient reports she continues to have very painful contractions.  She feels the contractions however are better than when she had the mckeon balloon in place.    O:   Patient Vitals for the past 24 hrs:   BP Temp Temp src Resp   21 0207 128/78 97.6  F (36.4  C) Oral 18   21 2246 (!) 137/90 97.7  F (36.5  C) -- 18   21 1828 (!) 132/90 97.8  F (36.6  C) Oral 18   ]   Gen: awake, alert, answering questions appropriately, appears uncomfortable during contractions  Cervix: at 0320: 2.5/70/-3/soft/mid     FHT:   Baseline 105  Variability moderate   Accels Present  Decels Absent     [place]: 3-4 contractions in 10 min    Membranes: intact    A/P: Meagan Monroy is a 36 year old  at 39w4d by LMP c/w 9w4d us, admitted for induction of labor for AMA.    Induction of labor:  Patient s/p 1 dose PV misoprostol.  Mckeon balloon placed at 0050 with 70ml in uterine balloon.  During evaluation and repositioning with FHR showing low baseline mckeon was removed.  Patient feels her pain is much better and is not interested in having the balloon placed again.  She would prefer using misoprostol for continued induction.    Plan for PV misoprostol dose following IV fluid bolus if contractions remain <4/10 minutes    FWB: Category 2, reactive.  Patient with low baseline of 105 bpm.  She continues to have moderate variability and accelerations.  It is notable that she had a dose of IM morphine at 0002.  Baseline upon admission and prior to morphine was 115bpm.  Given this history, it is most likely the lower baseline is related to IM morphine dose.  Discussed these FHT features with patient and her partner Young.  Reviewed that if the baseline became progressively lower or if recurrent decelerations, then  would be recommended.  At this point  is not recommended given the continued moderate variability and accelerations.  1L IV fluid bolus is in process now.  Acceleration  audible with fetal scalp stimulation of cervical exam.    Category 2 algorithm:  Moderate variability or accelerations: YES  Significant decelerations with >=50% of contractions for 1 hour: NO  =OBSERVE    Florence Snyder MD 3:29 AM

## 2021-08-24 NOTE — PROGRESS NOTES
[facility]  Labor Progress Note    S:  Patient feeling quite painful with cramping/contractions.    O:   Patient Vitals for the past 4 hrs:   BP Temp Resp   21 2246 (!) 137/90 97.7  F (36.5  C) 18     SVE: 1/50/-2, posterior, medium    FHT: Baseline 107-112, moderate variability, present accelerations,  no decelerations  [place]: 5 contractions in 10 minutes    A/P:  Ms. Meagan Monroy is a 36 year old  at 39w3d by LMP c/w 9w4d US, here for IOL for AMA. Currently undergoing cervical ripening with PV miso however FHT demonstrates occasionally low baseline of 107.    Labor: - now s/p 1 dose PV miso and elizabeth too frequently for subsequent dose. Will assess for balloon placement after better pain control. Getting morphine/vistaril now  FWB: - Category II FHT for low FHR baseline, hovering 107-112. Getting an IVF bolus now and continuous EFM.   PNC: - Rh pos, Rubella immune, GBS neg, , GTT 82/156/101/77 (passed), Placenta posterior    Rukhsana Chapman MD  ObGyn Resident, PGY2  11:53 PM 2021    Addendum:    FHT improved with position changes and IVF bolus. Baseline 113, moderate variability, pres accels, no decels.  Patient placed in stirrups given posterior cervix. Mckeon balloon placed and 70cc instilled.   Dr. Florence Snyder present for FHT review and mckeon placement.    Rukhsana Chapman MD  ObGyn Resident, PGY-2  2021 12:58 AM    Physician Attestation   I spent a total of 10 minutes with the patient, personally assisting as the resident performed mckeon balloon placement for mechanical cervical dilation.     Key findings: Patient is s/p morphine and vistaril at 0002 for pain. 70ml of fluid in uterine balloon  FHT baseline 116, moderate variability, + accelerations, no decels.  [place] 4 contractions/ 10 minutes.  Category 1, reactive and reassuring.       Florence Snyder  Date of Service (when I saw the patient): 21

## 2021-08-24 NOTE — PROGRESS NOTES
[facility] PROGRESS NOTE:   2021 4:30 PM         SUBJECTIVE:   Patient complains of contractions getting more intense. Planning to get in the tub, then considering epidural.  Contractions:  q 1-3 minutes  Leakage of fluid:  No  Vaginal bleeding:  No  Pain controlled:  Yes           OBJECTIVE:     Vitals:    21 0912 21 0932 21 1200 21 1511   BP: (!) 136/101 (!) 145/105 118/81 (!) 149/89   Resp:    16   Temp:    98.8  F (37.1  C)   TempSrc:    Oral         NST:  Fetal Heart Rate Tracin bpm baseline, mod variability, + accels, no decels  Category 1    Tocometer: q 1-3 minutes    Gen: alert, oriented, no distress  SCE: deferred         LABS:     Recent Results (from the past 12 hour(s))   CBC with platelets    Collection Time: 21  9:45 AM   Result Value Ref Range    WBC Count 13.8 (H) 4.0 - 11.0 10e3/uL    RBC Count 4.50 3.80 - 5.20 10e6/uL    Hemoglobin 13.3 11.7 - 15.7 g/dL    Hematocrit 40.0 35.0 - 47.0 %    MCV 89 78 - 100 fL    MCH 29.6 26.5 - 33.0 pg    MCHC 33.3 31.5 - 36.5 g/dL    RDW 12.4 10.0 - 15.0 %    Platelet Count 238 150 - 450 10e3/uL   ALT    Collection Time: 21  9:45 AM   Result Value Ref Range    ALT 17 0 - 50 U/L   AST    Collection Time: 21  9:45 AM   Result Value Ref Range    AST 19 0 - 45 U/L   Creatinine    Collection Time: 21  9:45 AM   Result Value Ref Range    Creatinine 0.61 0.52 - 1.04 mg/dL    GFR Estimate >90 >60 mL/min/1.73m2              ASSESSMENT / PLAN:   36 year old  at 39w4d admitted for IOL for AMA.  Gestational htn in labor.    Labor: continue pitocin  Fetal well being: Category 1 tracing.   GBS: neg  Pain: planning epidural  Gestational htn: normal to mild range BPs. Pre-E labs wnl. No s/sx pre-E  Anticipate .    Iris Wiggins MD

## 2021-08-24 NOTE — PROVIDER NOTIFICATION
08/24/21 0230   Provider Notification   Provider Name/Title Dr. Chapman   Method of Notification Electronic Page   Request Evaluate in Person   Notification Reason Fetal Baseline Change   Provider notified of intermittent FHR tracing in the 190s but audibly heard FHR in the 90s. Pt repositioned side to side and 1000mL fluids given..

## 2021-08-24 NOTE — PLAN OF CARE
Pt reports mild utx cramping, tolerable. Has heat pack if necessary. Reports much more comfortable than during mckeon process. No bleeding or LOF. Baby is active. Able to rest between approx 0330 and 06. Discuss plan for miso q4 if FHR WNL and ctx allow.  May use birth ball again after breakfast. FHR baseline 110, Cat I. Pos change, amb encouraged. Questions enc. Call light within reach.  at bedside.

## 2021-08-24 NOTE — PROGRESS NOTES
*Documentation delayed due to patient care*    Brief progress note    0230 Called to room by bedside RN due to FHR baseline change.  FHT baseline 95, moderate variability, present accels.  [place] 4-5 ctx in 10 min    Started fluid bolus and evaluated patient. She is elizabeth painfully with mckeon balloon in place. She is repositioned from side to side without improvement in baseline of FHR. Finally, mckeon balloon removed at 0300 due to concern for fetal distress given baseline HR in 90s. Continued IVF bolus to total 500ml and pt on her right side.    At 0305 FHT now improved to show:  Baseline 105, moderate variability, present accels  Nittany 3-4 ctx in 10.    Paged Dr. Snyder to review FHT and assist with plans for next steps.    Rukhsana Chapman MD  ObGyn Resident, PGY-2  August 24, 2021 3:05  AM

## 2021-08-24 NOTE — PROGRESS NOTES
[facility] PROGRESS NOTE:   2021 12:50 PM         SUBJECTIVE:   Patient complains of mild contractions. resting.    Contractions:  q 2-4 minutes  Leakage of fluid:  No  Vaginal bleeding:  No  Pain controlled:  Yes           OBJECTIVE:     Vitals:    21 0649 21 0912 21 0932 21 1200   BP: (!) 132/90 (!) 136/101 (!) 145/105 118/81   Resp: 18      Temp: 98.1  F (36.7  C)      TempSrc: Oral            NST:  Fetal Heart Rate Tracin bpm baseline, mod variability, + accels, no decels  Category 1    Tocometer: q 2-4 minutes    Pitocin: 4mu/min    Gen: alert, oriented, no distress  Cervix: deferred         LABS:     Recent Results (from the past 12 hour(s))   CBC with platelets    Collection Time: 21  9:45 AM   Result Value Ref Range    WBC Count 13.8 (H) 4.0 - 11.0 10e3/uL    RBC Count 4.50 3.80 - 5.20 10e6/uL    Hemoglobin 13.3 11.7 - 15.7 g/dL    Hematocrit 40.0 35.0 - 47.0 %    MCV 89 78 - 100 fL    MCH 29.6 26.5 - 33.0 pg    MCHC 33.3 31.5 - 36.5 g/dL    RDW 12.4 10.0 - 15.0 %    Platelet Count 238 150 - 450 10e3/uL   ALT    Collection Time: 21  9:45 AM   Result Value Ref Range    ALT 17 0 - 50 U/L   AST    Collection Time: 21  9:45 AM   Result Value Ref Range    AST 19 0 - 45 U/L   Creatinine    Collection Time: 21  9:45 AM   Result Value Ref Range    Creatinine 0.61 0.52 - 1.04 mg/dL    GFR Estimate >90 >60 mL/min/1.73m2              ASSESSMENT / PLAN:   36 year old  at 39w4d admitted for IOL for AMA.    Labor: s/p cervical ripening with miso and mckeon bulb, favorable this morning and started pitocin, continue to increase pitocin per protocl  Fetal well being: Category 1 tracing.   GBS: neg  Pain: planning epidural  Gestational htn: normal to mild range BPs, no s/sx pre-E. Repeat HELLP labs this morning wnl  Anticipate .    Iris Wiggins MD

## 2021-08-24 NOTE — H&P
History and Physical   2021  Meagan Monroy  [id number]      HPI: Meagan Monroy is a 36 year old  at 39w3d by LMP c/w 9w4d US who presents for scheduled IOL.    She denies vaginal bleeding or loss of fluid and is feeling normal fetal movement.     ROS: No headaches, vision changes, nausea, vomiting, fevers, chills, chest pain, SOB, no abdominal pain, constipation, diarrhea, dysuria, and foul-odor discharge    Her pregnancy is complicated by:  - AMA  - ARTEMIO  - Family hx CHD  -mild range BP on admission    OBHX:   OB History    Para Term  AB Living   1 0 0 0 0 0   SAB TAB Ectopic Multiple Live Births   0 0 0 0 0      # Outcome Date GA Lbr Inder/2nd Weight Sex Delivery Anes PTL Lv   1 Current                MedicalHX:   Past Medical History:   Diagnosis Date     Mild asthma        SurgicalHX:   Past Surgical History:   Procedure Laterality Date     wisdom teeth         Medications:   No current facility-administered medications on file prior to encounter.  Ascorbic Acid (VITAMIN C PO), Take 250 mg by mouth daily  ASPIRIN 81 PO,   Cetirizine HCl (ZYRTEC PO),   Cyanocobalamin (VITAMIN B 12 PO), Take 1,000 mcg by mouth daily  ferrous sulfate (FEROSUL) 325 (65 Fe) MG tablet, Take 325 mg by mouth every other day  Prenatal Vit-Fe Fumarate-FA (PRENATAL MULTIVITAMIN W/IRON) 27-0.8 MG tablet, Take 1 tablet by mouth daily  acetaminophen (TYLENOL) 325 MG tablet, Take 2 tablets (650 mg) by mouth every 6 hours as needed for mild pain Start after Delivery.  albuterol (PROAIR HFA/PROVENTIL HFA/VENTOLIN HFA) 108 (90 Base) MCG/ACT inhaler, Inhale 1-2 puffs into the lungs   augmented betamethasone dipropionate (DIPROLENE-AF) 0.05 % external ointment, Apply a thin layer to affected skin twice a day for up to 2 weeks (avoid face, body folds)  beclomethasone HFA (QVAR REDIHALER) 40 MCG/ACT inhaler, Inhale 40 mcg into the lungs   ibuprofen (ADVIL/MOTRIN) 200 MG tablet, Take 3 tablets (600 mg) by mouth every 6  hours as needed for moderate pain Start after delivery  senna-docusate (SENOKOT-S/PERICOLACE) 8.6-50 MG tablet, Take 1 tablet by mouth daily Start after delivery.        Allergies:  No Known Allergies    FamilyHX:  Family History   Problem Relation Age of Onset     Asthma Mother      Tongue cancer Father      Hypertension Father      Diabetes Paternal Grandmother      Heart Failure Paternal Grandmother      Childhood Heart Disease Brother      Denies bleeding or clotting disorders in the family    SocialHX:   Denies drinking, smoking, drug use in pregnancy    ROS: 10-point ROS negative except as indicated in HPI.    Physical Exam:  Vitals:    21 1828   BP: (!) 132/90   Resp: 18   Temp: 97.8  F (36.6  C)   TempSrc: Oral     General: alert, oriented female, resting in bed in NAD  CV: regular rate and rhythm  Lungs: clear bilaterally, no crackles or wheezes  Abdomen: soft, gravid, non-tender, EFW 7lbs by Kathyds  Extremities: bilateral lower extremities non-tender with no edema    SVE: deferred, /-2   Presentation: cephalic by BSUS    FHT: baseline 120, moderate variability, present accelerations, no decelerations  [place]: 1 contractions in 10 mins    Prenatal Labs:      Lab Results   Component Value Date    ABO B 2021    RH Pos 2021    AS Neg 2021    HEPBANG Nonreactive 2021    HGB 12.5 2021       GBS Status:   GBS negative 21    Assessment: 36 year old  at 39w3d by LMP c/w 9w4d US, here for scheduled IOL for AMA    Plan:    Induction of Labor  - Admit for IOL in the setting of AMA  - Cervix: /-2, Bishops score 4   - Given above Bishops score, will plan to start PV miso. Also recommend a balloon for ripening but patient declined at this time. Open to balloon if elizabeth too much for repeat misoprostol dose   - Membranes: intact  - Augmentation: AROM, pit prn  - Labs: CBC, T&S, RPR  - GBS neg; Antibiotics not indicated.  - Pain Control: Per patient request;  Discussed options    - Vistaril and IM Morphine for therapeutic sleep   - Desires epidural in active labor.   - Diet: regular  - PPH Meds: avoid hemabate given asthma    Family hx CHD  - normal fetal echo    FWB/PNC  - Category 1, reactive and reassuring FHT  - Rh pos, rubella immune, infectious disease labs wnl, , GTT 82/156/101/77 (passed), GBS neg    Staffed with Dr. Florence Chapman MD  ObGyn Resident, PGY2  21 7:42 PM    Physician Attestation   I, Florence Snyder MD, personally examined and evaluated this patient.  I discussed the patient with the resident/fellow and care team, and agree with the assessment and plan of care as documented in the note of 21.      I personally reviewed vital signs, medications and labs.    Gutierrez findings: Meagan Monroy is a 36 year old  at 39w4d by LMP c/w 9w4d us who is admitted for IOL for AMA.  Patient states she has felt some intermittent contractions over the past few days, but nothing regular.  She denies any leaking fluid or vaginal bleeding.  FHT category 1, reactive.  GBS negative.  Mild range BP on admission, thus HELLP labs performed.  Discussed mild range BP and additional labs with patient.  Reviewed plan for induction of labor to start with PV misoprostol and potential for mckeon balloon if at any point elizabeth too much for re-dose.  Patient's questions were answered.    Florence Snyder MD  Date of Service (when I saw the patient): 21

## 2021-08-24 NOTE — PLAN OF CARE
Moore of 10 and MD discussed pitocin. Pt and  amenable to plan. Eating breakfast, then shower then to start pitocin. Pt will request epidural when necessary and pt aware of timing and bolus for same. MD will discuss with anesthesia.

## 2021-08-24 NOTE — ANESTHESIA PROCEDURE NOTES
Epidural catheter Procedure Note  Pre-Procedure   Staff -        Anesthesiologist:  Radha Peña MD       Performed By: anesthesiologist       Location: OB       Procedure Start/Stop Times: 8/24/2021 6:10 PM and 8/24/2021 6:48 PM       Pre-Anesthestic Checklist: patient identified, IV checked, risks and benefits discussed, informed consent, monitors and equipment checked, pre-op evaluation, at physician/surgeon's request and post-op pain management  Timeout:       Correct Patient: Yes        Correct Procedure: Yes        Correct Site: Yes        Correct Position: Yes   Procedure Documentation  Procedure: epidural catheter       Patient Position: sitting       Skin prep: Chloraprep       Local skin infiltrated with 3 mL of 1% lidocaine.        Insertion Site: L3-4. (midline approach).       Technique: LORT saline        Needle Type: HighFive Mobiley needle       Needle Gauge: 18.        Needle Length (Inches): 3.5        Catheter: 18 G.         Catheter threaded easily.         4 cm epidural space.         Threaded 12 cm at skin.         # of attempts: 1 and  # of redirects:  1    Assessment/Narrative         Paresthesias: No.      Test dose of 3 mL lidocaine 1.5% w/ 1:200,000 epinephrine at.         Test dose negative, 3 minutes after injection, for signs of intravascular, subdural, or intrathecal injection.       Insertion/Infusion Method: LORT saline       Aspiration negative for Heme or CSF via Epidural Catheter.    Medication(s) Administered   0.125% Bupivacaine + 2 mcg/mL Fentanyl via CADD (Epidural), 10 mL  Medication Administration Time: 8/24/2021 6:49 PM    Comments:  Easily placed

## 2021-08-24 NOTE — PROGRESS NOTES
[facility] PRO[facility]ESS NOTE:   2021 9:31 AM         SUBJECTIVE:   Patient complains of mild cramping.    Contractions:  irregular  Leakage of fluid:  No  Vaginal bleeding:  No  Pain controlled:  Yes    Planning epidural. Requests staff place epidural.          OBJECTIVE:     Vitals:    21 0230 21 0300 21 0649 21 0912   BP:   (!) 132/90 (!) 136/101   Resp:   18    Temp: 97.7  F (36.5  C) 97.8  F (36.6  C) 98.1  F (36.7  C)    TempSrc: Oral Oral Oral          NST:  Fetal Heart Rate Tracin bpm baseline, mod variability, + accels, no decels  Category 1    Tocometer: irreg every 1-10 minutes    Gen: alert, oriented, no distress  Abdomen:  Gravid, nontender  Cervix:   Dilation: 3   Effacement: 80%   Station:-2   Consistency: soft   Position: Anterior         LABS:     Recent Results (from the past 12 hour(s))   Protein  random urine with Creat Ratio    Collection Time: 21 10:11 PM   Result Value Ref Range    Total Protein Random Urine g/L 0.19 g/L    Total Protein Urine g/gr Creatinine 0.22 (H) 0.00 - 0.20 g/g Cr    Creatinine Urine mg/dL 86 mg/dL              ASSESSMENT / PLAN:   36 year old  at 39w4d admitted for IOL for advanced maternal age.  Meets criteria in labor for gestational hypertension.    Labor: s/p miso, mckeon bulb, miso. Cervix now favorable, hurt 10. Will start pitocin, discussed with patient  Fetal well being: Category 1 tracing. Low baseline noted  GBS: negative  Pain: planning epidural, will let anesthesia know  Gestational htn: no s/sx pre-E, will recheck HELLP labs now  Anticipate .    Iris Wiggins MD

## 2021-08-24 NOTE — PROGRESS NOTES
[facility]  Labor Progress Note    S:  Patient doing well at this time - feeling increasing cramping pain with contractions at this time. Comfortable with SVE.     O:   Patient Vitals for the past 4 hrs:   BP   21 1200 118/81     SVE: 2-3/70/-2, medium, posterior - unchanged from prior     FHT: Baseline 100bpm, moderate variability, positive accelerations, no decelerations  [place]: 2-3 contractions in 10 minutes    A/P:  Ms. Meagan Monroy is a 36 year old  at 39w4d by LMP c/w 9w4d US, here for IOL for AMA.     Induction of Labor   Labor:   - PV miso x1 ()> 1/50/-2, mckeon 70cc (0050)> mckeon deflated/out, 2-3/70/-2 (0)> PV miso x1 (452) > 3/80/-2 (0912)> IV Pit on at this time at 6mU/min. Continue to titrate prn.   FWB:    - Category I FHT, reactive and reassuring.  PNC:     - Rh pos, Rubella immune, GBS neg, , GTT 82/156/101/77 (passed), Placenta posterior    Gestational HTN   - Diagnosed intrapartum   - HELLP labs wnl, UPC 0.22 ()  - Repeat HELLP labs 0900 wnl    Exercise-induced Asthma   - Avoid Hemabate.      Holly Rutherford MD  OB/GYN PGY-2  21 2:02 PM

## 2021-08-24 NOTE — PLAN OF CARE
Patient and spouse arrived to unit at 1830 for Induction of Labor. Patient settled into room, PIV inserted, labs collected, and EFM applied. Patient denies bleeding or leaking of fluids. Reports baseline amount of vaginal discharge. Patient has been feeling [place] contractions occasionally, mild by palpation.        MD to bedside, confirmed cephalic fetal presentation with BSUS. Report given to Shane TOLENTINO RN at 1915. Care relinquished.

## 2021-08-25 ENCOUNTER — ANCILLARY PROCEDURE (OUTPATIENT)
Dept: ULTRASOUND IMAGING | Facility: CLINIC | Age: 36
End: 2021-08-25
Attending: ANESTHESIOLOGY
Payer: COMMERCIAL

## 2021-08-25 PROCEDURE — 258N000003 HC RX IP 258 OP 636: Performed by: STUDENT IN AN ORGANIZED HEALTH CARE EDUCATION/TRAINING PROGRAM

## 2021-08-25 PROCEDURE — 59510 CESAREAN DELIVERY: CPT | Mod: GC | Performed by: OBSTETRICS & GYNECOLOGY

## 2021-08-25 PROCEDURE — 370N000017 HC ANESTHESIA TECHNICAL FEE, PER MIN: Performed by: OBSTETRICS & GYNECOLOGY

## 2021-08-25 PROCEDURE — 999N000141 HC STATISTIC PRE-PROCEDURE NURSING ASSESSMENT: Performed by: OBSTETRICS & GYNECOLOGY

## 2021-08-25 PROCEDURE — 250N000009 HC RX 250: Performed by: ANESTHESIOLOGY

## 2021-08-25 PROCEDURE — 272N000001 HC OR GENERAL SUPPLY STERILE: Performed by: OBSTETRICS & GYNECOLOGY

## 2021-08-25 PROCEDURE — 250N000011 HC RX IP 250 OP 636

## 2021-08-25 PROCEDURE — 250N000013 HC RX MED GY IP 250 OP 250 PS 637

## 2021-08-25 PROCEDURE — 250N000011 HC RX IP 250 OP 636: Performed by: ANESTHESIOLOGY

## 2021-08-25 PROCEDURE — 10907ZC DRAINAGE OF AMNIOTIC FLUID, THERAPEUTIC FROM PRODUCTS OF CONCEPTION, VIA NATURAL OR ARTIFICIAL OPENING: ICD-10-PCS | Performed by: OBSTETRICS & GYNECOLOGY

## 2021-08-25 PROCEDURE — C9290 INJ, BUPIVACAINE LIPOSOME: HCPCS | Performed by: ANESTHESIOLOGY

## 2021-08-25 PROCEDURE — 250N000013 HC RX MED GY IP 250 OP 250 PS 637: Performed by: STUDENT IN AN ORGANIZED HEALTH CARE EDUCATION/TRAINING PROGRAM

## 2021-08-25 PROCEDURE — 250N000009 HC RX 250: Performed by: OBSTETRICS & GYNECOLOGY

## 2021-08-25 PROCEDURE — 250N000011 HC RX IP 250 OP 636: Performed by: STUDENT IN AN ORGANIZED HEALTH CARE EDUCATION/TRAINING PROGRAM

## 2021-08-25 PROCEDURE — 271N000001 HC OR GENERAL SUPPLY NON-STERILE: Performed by: OBSTETRICS & GYNECOLOGY

## 2021-08-25 PROCEDURE — 710N000009 HC RECOVERY PHASE 1, LEVEL 1, PER MIN: Performed by: OBSTETRICS & GYNECOLOGY

## 2021-08-25 PROCEDURE — 360N000076 HC SURGERY LEVEL 3, PER MIN: Performed by: OBSTETRICS & GYNECOLOGY

## 2021-08-25 PROCEDURE — 120N000002 HC R&B MED SURG/OB UMMC

## 2021-08-25 PROCEDURE — 999N000016 HC STATISTIC ATTENDANCE AT DELIVERY

## 2021-08-25 PROCEDURE — 250N000009 HC RX 250: Performed by: STUDENT IN AN ORGANIZED HEALTH CARE EDUCATION/TRAINING PROGRAM

## 2021-08-25 RX ORDER — PROCHLORPERAZINE 25 MG
25 SUPPOSITORY, RECTAL RECTAL EVERY 12 HOURS PRN
Status: DISCONTINUED | OUTPATIENT
Start: 2021-08-25 | End: 2021-08-28 | Stop reason: HOSPADM

## 2021-08-25 RX ORDER — KETOROLAC TROMETHAMINE 30 MG/ML
30 INJECTION, SOLUTION INTRAMUSCULAR; INTRAVENOUS
Status: DISCONTINUED | OUTPATIENT
Start: 2021-08-25 | End: 2021-08-26

## 2021-08-25 RX ORDER — MISOPROSTOL 200 UG/1
400 TABLET ORAL
Status: DISCONTINUED | OUTPATIENT
Start: 2021-08-25 | End: 2021-08-28 | Stop reason: HOSPADM

## 2021-08-25 RX ORDER — METHYLERGONOVINE MALEATE 0.2 MG/ML
200 INJECTION INTRAVENOUS
Status: DISCONTINUED | OUTPATIENT
Start: 2021-08-25 | End: 2021-08-28 | Stop reason: HOSPADM

## 2021-08-25 RX ORDER — METOCLOPRAMIDE 10 MG/1
10 TABLET ORAL EVERY 6 HOURS PRN
Status: DISCONTINUED | OUTPATIENT
Start: 2021-08-25 | End: 2021-08-28 | Stop reason: HOSPADM

## 2021-08-25 RX ORDER — CEFAZOLIN SODIUM 1 G/3ML
INJECTION, POWDER, FOR SOLUTION INTRAMUSCULAR; INTRAVENOUS PRN
Status: DISCONTINUED | OUTPATIENT
Start: 2021-08-25 | End: 2021-08-25

## 2021-08-25 RX ORDER — PROCHLORPERAZINE MALEATE 10 MG
10 TABLET ORAL EVERY 6 HOURS PRN
Status: DISCONTINUED | OUTPATIENT
Start: 2021-08-25 | End: 2021-08-28 | Stop reason: HOSPADM

## 2021-08-25 RX ORDER — NALOXONE HYDROCHLORIDE 0.4 MG/ML
0.2 INJECTION, SOLUTION INTRAMUSCULAR; INTRAVENOUS; SUBCUTANEOUS
Status: DISCONTINUED | OUTPATIENT
Start: 2021-08-25 | End: 2021-08-28 | Stop reason: HOSPADM

## 2021-08-25 RX ORDER — MISOPROSTOL 200 UG/1
800 TABLET ORAL
Status: DISCONTINUED | OUTPATIENT
Start: 2021-08-25 | End: 2021-08-28 | Stop reason: HOSPADM

## 2021-08-25 RX ORDER — CITRIC ACID/SODIUM CITRATE 334-500MG
SOLUTION, ORAL ORAL
Status: DISCONTINUED
Start: 2021-08-25 | End: 2021-08-25 | Stop reason: HOSPADM

## 2021-08-25 RX ORDER — OXYCODONE HYDROCHLORIDE 5 MG/1
5 TABLET ORAL EVERY 4 HOURS PRN
Status: DISCONTINUED | OUTPATIENT
Start: 2021-08-25 | End: 2021-08-26

## 2021-08-25 RX ORDER — IBUPROFEN 600 MG/1
600 TABLET, FILM COATED ORAL
Status: DISCONTINUED | OUTPATIENT
Start: 2021-08-25 | End: 2021-08-26

## 2021-08-25 RX ORDER — KETOROLAC TROMETHAMINE 30 MG/ML
30 INJECTION, SOLUTION INTRAMUSCULAR; INTRAVENOUS EVERY 6 HOURS
Status: COMPLETED | OUTPATIENT
Start: 2021-08-25 | End: 2021-08-26

## 2021-08-25 RX ORDER — AMOXICILLIN 250 MG
1 CAPSULE ORAL 2 TIMES DAILY
Status: DISCONTINUED | OUTPATIENT
Start: 2021-08-25 | End: 2021-08-28 | Stop reason: HOSPADM

## 2021-08-25 RX ORDER — METOCLOPRAMIDE HYDROCHLORIDE 5 MG/ML
10 INJECTION INTRAMUSCULAR; INTRAVENOUS EVERY 6 HOURS PRN
Status: DISCONTINUED | OUTPATIENT
Start: 2021-08-25 | End: 2021-08-28 | Stop reason: HOSPADM

## 2021-08-25 RX ORDER — LIDOCAINE 40 MG/G
CREAM TOPICAL
Status: DISCONTINUED | OUTPATIENT
Start: 2021-08-25 | End: 2021-08-25

## 2021-08-25 RX ORDER — MAGNESIUM HYDROXIDE 1200 MG/15ML
LIQUID ORAL PRN
Status: DISCONTINUED | OUTPATIENT
Start: 2021-08-25 | End: 2021-08-28 | Stop reason: HOSPADM

## 2021-08-25 RX ORDER — BUPIVACAINE HYDROCHLORIDE 2.5 MG/ML
INJECTION, SOLUTION EPIDURAL; INFILTRATION; INTRACAUDAL
Status: DISCONTINUED | OUTPATIENT
Start: 2021-08-25 | End: 2021-08-25

## 2021-08-25 RX ORDER — OXYTOCIN/0.9 % SODIUM CHLORIDE 30/500 ML
100-340 PLASTIC BAG, INJECTION (ML) INTRAVENOUS CONTINUOUS PRN
Status: DISCONTINUED | OUTPATIENT
Start: 2021-08-25 | End: 2021-08-26

## 2021-08-25 RX ORDER — HYDROMORPHONE HCL IN WATER/PF 6 MG/30 ML
0.2 PATIENT CONTROLLED ANALGESIA SYRINGE INTRAVENOUS EVERY 5 MIN PRN
Status: DISCONTINUED | OUTPATIENT
Start: 2021-08-25 | End: 2021-08-26

## 2021-08-25 RX ORDER — TRANEXAMIC ACID 10 MG/ML
1 INJECTION, SOLUTION INTRAVENOUS EVERY 30 MIN PRN
Status: DISCONTINUED | OUTPATIENT
Start: 2021-08-25 | End: 2021-08-28 | Stop reason: HOSPADM

## 2021-08-25 RX ORDER — FLUMAZENIL 0.1 MG/ML
0.2 INJECTION, SOLUTION INTRAVENOUS
Status: DISCONTINUED | OUTPATIENT
Start: 2021-08-25 | End: 2021-08-25 | Stop reason: HOSPADM

## 2021-08-25 RX ORDER — NALOXONE HYDROCHLORIDE 0.4 MG/ML
0.4 INJECTION, SOLUTION INTRAMUSCULAR; INTRAVENOUS; SUBCUTANEOUS
Status: DISCONTINUED | OUTPATIENT
Start: 2021-08-25 | End: 2021-08-28 | Stop reason: HOSPADM

## 2021-08-25 RX ORDER — LABETALOL HYDROCHLORIDE 5 MG/ML
10 INJECTION, SOLUTION INTRAVENOUS
Status: DISCONTINUED | OUTPATIENT
Start: 2021-08-25 | End: 2021-08-25 | Stop reason: HOSPADM

## 2021-08-25 RX ORDER — NALOXONE HYDROCHLORIDE 0.4 MG/ML
0.2 INJECTION, SOLUTION INTRAMUSCULAR; INTRAVENOUS; SUBCUTANEOUS
Status: DISCONTINUED | OUTPATIENT
Start: 2021-08-25 | End: 2021-08-25

## 2021-08-25 RX ORDER — IBUPROFEN 800 MG/1
800 TABLET, FILM COATED ORAL EVERY 6 HOURS
Status: DISCONTINUED | OUTPATIENT
Start: 2021-08-26 | End: 2021-08-28 | Stop reason: HOSPADM

## 2021-08-25 RX ORDER — NALOXONE HYDROCHLORIDE 0.4 MG/ML
0.4 INJECTION, SOLUTION INTRAMUSCULAR; INTRAVENOUS; SUBCUTANEOUS
Status: DISCONTINUED | OUTPATIENT
Start: 2021-08-25 | End: 2021-08-25

## 2021-08-25 RX ORDER — ONDANSETRON 4 MG/1
4 TABLET, ORALLY DISINTEGRATING ORAL EVERY 6 HOURS PRN
Status: DISCONTINUED | OUTPATIENT
Start: 2021-08-25 | End: 2021-08-28 | Stop reason: HOSPADM

## 2021-08-25 RX ORDER — ONDANSETRON 2 MG/ML
4 INJECTION INTRAMUSCULAR; INTRAVENOUS EVERY 30 MIN PRN
Status: DISCONTINUED | OUTPATIENT
Start: 2021-08-25 | End: 2021-08-25 | Stop reason: HOSPADM

## 2021-08-25 RX ORDER — OXYTOCIN 10 [USP'U]/ML
10 INJECTION, SOLUTION INTRAMUSCULAR; INTRAVENOUS
Status: DISCONTINUED | OUTPATIENT
Start: 2021-08-25 | End: 2021-08-26

## 2021-08-25 RX ORDER — HYDROCORTISONE 2.5 %
CREAM (GRAM) TOPICAL 3 TIMES DAILY PRN
Status: DISCONTINUED | OUTPATIENT
Start: 2021-08-25 | End: 2021-08-28 | Stop reason: HOSPADM

## 2021-08-25 RX ORDER — DEXTROSE, SODIUM CHLORIDE, SODIUM LACTATE, POTASSIUM CHLORIDE, AND CALCIUM CHLORIDE 5; .6; .31; .03; .02 G/100ML; G/100ML; G/100ML; G/100ML; G/100ML
INJECTION, SOLUTION INTRAVENOUS CONTINUOUS
Status: DISCONTINUED | OUTPATIENT
Start: 2021-08-25 | End: 2021-08-28 | Stop reason: HOSPADM

## 2021-08-25 RX ORDER — AMOXICILLIN 250 MG
2 CAPSULE ORAL 2 TIMES DAILY
Status: DISCONTINUED | OUTPATIENT
Start: 2021-08-25 | End: 2021-08-28 | Stop reason: HOSPADM

## 2021-08-25 RX ORDER — MODIFIED LANOLIN
OINTMENT (GRAM) TOPICAL
Status: DISCONTINUED | OUTPATIENT
Start: 2021-08-25 | End: 2021-08-28 | Stop reason: HOSPADM

## 2021-08-25 RX ORDER — SODIUM CHLORIDE, SODIUM LACTATE, POTASSIUM CHLORIDE, CALCIUM CHLORIDE 600; 310; 30; 20 MG/100ML; MG/100ML; MG/100ML; MG/100ML
INJECTION, SOLUTION INTRAVENOUS
Status: DISCONTINUED
Start: 2021-08-25 | End: 2021-08-25 | Stop reason: HOSPADM

## 2021-08-25 RX ORDER — ONDANSETRON 2 MG/ML
4 INJECTION INTRAMUSCULAR; INTRAVENOUS EVERY 6 HOURS PRN
Status: DISCONTINUED | OUTPATIENT
Start: 2021-08-25 | End: 2021-08-28 | Stop reason: HOSPADM

## 2021-08-25 RX ORDER — BISACODYL 10 MG
10 SUPPOSITORY, RECTAL RECTAL DAILY PRN
Status: DISCONTINUED | OUTPATIENT
Start: 2021-08-27 | End: 2021-08-28 | Stop reason: HOSPADM

## 2021-08-25 RX ORDER — ACETAMINOPHEN 325 MG/1
975 TABLET ORAL EVERY 6 HOURS
Status: DISCONTINUED | OUTPATIENT
Start: 2021-08-25 | End: 2021-08-28 | Stop reason: HOSPADM

## 2021-08-25 RX ORDER — OXYTOCIN 10 [USP'U]/ML
10 INJECTION, SOLUTION INTRAMUSCULAR; INTRAVENOUS
Status: DISCONTINUED | OUTPATIENT
Start: 2021-08-25 | End: 2021-08-28 | Stop reason: HOSPADM

## 2021-08-25 RX ORDER — CEFAZOLIN SODIUM 2 G/100ML
INJECTION, SOLUTION INTRAVENOUS PRN
Status: DISCONTINUED | OUTPATIENT
Start: 2021-08-25 | End: 2021-08-25

## 2021-08-25 RX ORDER — CARBOPROST TROMETHAMINE 250 UG/ML
250 INJECTION, SOLUTION INTRAMUSCULAR
Status: DISCONTINUED | OUTPATIENT
Start: 2021-08-25 | End: 2021-08-28 | Stop reason: HOSPADM

## 2021-08-25 RX ORDER — OXYTOCIN/0.9 % SODIUM CHLORIDE 30/500 ML
340 PLASTIC BAG, INJECTION (ML) INTRAVENOUS CONTINUOUS PRN
Status: DISCONTINUED | OUTPATIENT
Start: 2021-08-25 | End: 2021-08-28 | Stop reason: HOSPADM

## 2021-08-25 RX ORDER — SIMETHICONE 80 MG
80 TABLET,CHEWABLE ORAL 4 TIMES DAILY PRN
Status: DISCONTINUED | OUTPATIENT
Start: 2021-08-25 | End: 2021-08-28 | Stop reason: HOSPADM

## 2021-08-25 RX ORDER — SODIUM CHLORIDE, SODIUM LACTATE, POTASSIUM CHLORIDE, CALCIUM CHLORIDE 600; 310; 30; 20 MG/100ML; MG/100ML; MG/100ML; MG/100ML
INJECTION, SOLUTION INTRAVENOUS CONTINUOUS
Status: DISCONTINUED | OUTPATIENT
Start: 2021-08-25 | End: 2021-08-25 | Stop reason: HOSPADM

## 2021-08-25 RX ORDER — ONDANSETRON 4 MG/1
4 TABLET, ORALLY DISINTEGRATING ORAL EVERY 30 MIN PRN
Status: DISCONTINUED | OUTPATIENT
Start: 2021-08-25 | End: 2021-08-25 | Stop reason: HOSPADM

## 2021-08-25 RX ADMIN — DOCUSATE SODIUM AND SENNOSIDES 1 TABLET: 8.6; 5 TABLET ORAL at 20:42

## 2021-08-25 RX ADMIN — OXYTOCIN-SODIUM CHLORIDE 0.9% IV SOLN 30 UNIT/500ML 300 ML/HR: 30-0.9/5 SOLUTION at 10:27

## 2021-08-25 RX ADMIN — KETOROLAC TROMETHAMINE 30 MG: 30 INJECTION, SOLUTION INTRAMUSCULAR; INTRAVENOUS at 13:01

## 2021-08-25 RX ADMIN — SODIUM CITRATE AND CITRIC ACID MONOHYDRATE: 500; 334 SOLUTION ORAL at 10:15

## 2021-08-25 RX ADMIN — SODIUM CHLORIDE, POTASSIUM CHLORIDE, SODIUM LACTATE AND CALCIUM CHLORIDE: 600; 310; 30; 20 INJECTION, SOLUTION INTRAVENOUS at 04:13

## 2021-08-25 RX ADMIN — HYDROMORPHONE HYDROCHLORIDE 0.2 MG: 0.2 INJECTION, SOLUTION INTRAMUSCULAR; INTRAVENOUS; SUBCUTANEOUS at 12:48

## 2021-08-25 RX ADMIN — BUPIVACAINE HYDROCHLORIDE 20 ML: 2.5 INJECTION, SOLUTION EPIDURAL; INFILTRATION; INTRACAUDAL at 11:12

## 2021-08-25 RX ADMIN — OXYCODONE HYDROCHLORIDE 5 MG: 5 TABLET ORAL at 18:17

## 2021-08-25 RX ADMIN — KETOROLAC TROMETHAMINE 30 MG: 30 INJECTION, SOLUTION INTRAMUSCULAR; INTRAVENOUS at 18:53

## 2021-08-25 RX ADMIN — AZITHROMYCIN MONOHYDRATE 250 MG: 500 INJECTION, POWDER, LYOPHILIZED, FOR SOLUTION INTRAVENOUS at 10:19

## 2021-08-25 RX ADMIN — HYDROMORPHONE HYDROCHLORIDE 0.2 MG: 0.2 INJECTION, SOLUTION INTRAMUSCULAR; INTRAVENOUS; SUBCUTANEOUS at 12:23

## 2021-08-25 RX ADMIN — SIMETHICONE 80 MG: 80 TABLET, CHEWABLE ORAL at 20:42

## 2021-08-25 RX ADMIN — SODIUM CHLORIDE, SODIUM LACTATE, POTASSIUM CHLORIDE, CALCIUM CHLORIDE AND DEXTROSE MONOHYDRATE: 5; 600; 310; 30; 20 INJECTION, SOLUTION INTRAVENOUS at 18:50

## 2021-08-25 RX ADMIN — SODIUM CHLORIDE, POTASSIUM CHLORIDE, SODIUM LACTATE AND CALCIUM CHLORIDE: 600; 310; 30; 20 INJECTION, SOLUTION INTRAVENOUS at 10:08

## 2021-08-25 RX ADMIN — LIDOCAINE HYDROCHLORIDE,EPINEPHRINE BITARTRATE 5 ML: 15; .005 INJECTION, SOLUTION EPIDURAL; INFILTRATION; INTRACAUDAL; PERINEURAL at 10:10

## 2021-08-25 RX ADMIN — ONDANSETRON 4 MG: 2 INJECTION INTRAMUSCULAR; INTRAVENOUS at 10:52

## 2021-08-25 RX ADMIN — Medication 25 MCG/MIN: at 10:18

## 2021-08-25 RX ADMIN — OXYCODONE HYDROCHLORIDE 5 MG: 5 TABLET ORAL at 14:58

## 2021-08-25 RX ADMIN — ACETAMINOPHEN 975 MG: 325 TABLET, FILM COATED ORAL at 18:15

## 2021-08-25 RX ADMIN — ACETAMINOPHEN 975 MG: 325 TABLET, FILM COATED ORAL at 12:07

## 2021-08-25 RX ADMIN — Medication 100 ML/HR: at 13:46

## 2021-08-25 RX ADMIN — Medication: at 02:13

## 2021-08-25 RX ADMIN — LIDOCAINE HYDROCHLORIDE,EPINEPHRINE BITARTRATE 5 ML: 15; .005 INJECTION, SOLUTION EPIDURAL; INFILTRATION; INTRACAUDAL; PERINEURAL at 10:17

## 2021-08-25 RX ADMIN — LIDOCAINE HYDROCHLORIDE,EPINEPHRINE BITARTRATE 5 ML: 15; .005 INJECTION, SOLUTION EPIDURAL; INFILTRATION; INTRACAUDAL; PERINEURAL at 10:14

## 2021-08-25 RX ADMIN — LIDOCAINE HYDROCHLORIDE,EPINEPHRINE BITARTRATE 3 ML: 15; .005 INJECTION, SOLUTION EPIDURAL; INFILTRATION; INTRACAUDAL; PERINEURAL at 10:20

## 2021-08-25 RX ADMIN — PHENYLEPHRINE HYDROCHLORIDE 100 MCG: 10 INJECTION INTRAVENOUS at 10:16

## 2021-08-25 RX ADMIN — BUPIVACAINE 20 ML: 13.3 INJECTION, SUSPENSION, LIPOSOMAL INFILTRATION at 11:12

## 2021-08-25 RX ADMIN — OXYCODONE HYDROCHLORIDE 5 MG: 5 TABLET ORAL at 22:26

## 2021-08-25 RX ADMIN — Medication 2 G: at 10:16

## 2021-08-25 RX ADMIN — CALCIUM CARBONATE (ANTACID) CHEW TAB 500 MG 1000 MG: 500 CHEW TAB at 08:44

## 2021-08-25 NOTE — ANESTHESIA CARE TRANSFER NOTE
Patient: Meagan Monroy    Procedure(s):   SECTION    Diagnosis: * No pre-op diagnosis entered *  Diagnosis Additional Information: No value filed.    Anesthesia Type:   Epidural     Note:    Oropharynx: oropharynx clear of all foreign objects and spontaneously breathing  Level of Consciousness: awake      Independent Airway: airway patency satisfactory and stable  Dentition: dentition unchanged  Vital Signs Stable: post-procedure vital signs reviewed and stable  Report to RN Given: handoff report given  Patient transferred to: PACU    Handoff Report: Identifed the Patient, Identified the Reponsible Provider, Reviewed the pertinent medical history, Discussed the surgical course, Reviewed Intra-OP anesthesia mangement and issues during anesthesia, Set expectations for post-procedure period and Allowed opportunity for questions and acknowledgement of understanding      Vitals:  Vitals Value Taken Time   BP     Temp     Pulse     Resp     SpO2         Electronically Signed By: Kevin Albert MD  2021  11:38 AM

## 2021-08-25 NOTE — BRIEF OP NOTE
Section Brief Op Note    Meagan Monroy  [id number]    Date of Surgery: 21     Surgeon: Julissa Garcia MD    Assistants: Holly Rutherford MD PGY-2    Pre-operative Diagnoses:   -  at 39w5d  - Asthma, exercise-induced  - GHTN (dx'd intrapartum)  - Category II FHT Remote from Delivery     Post-operative Diagnoses:   - Same, now  delivered via below stated procedure    Procedure: Primary low transverse  section with double layer uterine closure via Pfannenstiel skin incision    Anesthesia: Spinal    QBL: 548cc  IVF: 130cccc  UO: 500cc    Complications: None  Findings: A single vigorous, liveborn male infant weighing 3.09kg with Apgars pending - cord gas pH 7.03  Delivery initially attempted in cephalic presentation with compound hand apparent - however with elevation of fetal head to hysterotomy, fetus changed to piter breech presentation. Infant delivered thus in piter breech. Normal appearing uterus, fallopian tubes, ovaries.  No nuchal cord.  Clear amniontic fluid. No abdominal wall adhesions. No intraabdominal adhesions  Specimens: Cord segment/blood    oHlly Rutherford MD   OB/GYN PGY-2  21 11:22 AM

## 2021-08-25 NOTE — PROGRESS NOTES
Delayed entry from approx 1000 due to direct patient cares    Called to room while patient for prolonged decel.  Patient had been pushing with slow descent for about 1.5hrs.  Upon arrival patient on RLS and pitocin off.  FHR 90's and had been down for about 5 min without improvement.  Exam showed fetal station still +2 and no response to scalp stim.  Patient repositioned with minimal improvement and at 7 min, stat c/s called.  I explained to patient she is borderline low enough for VE and given slow pushing progress did not feel that was the safest option for baby and she was agreeable to c/s.    Verbal consent obtained and questions answered.  While consenting, the FHR did recover to 110's and had mod variability.  Anesthesia informed and patient moved to OR in rapid fashion.  Once in OR, FHR was still in the 130's and decision made to attempt to bolus epidural as long as FHR remained reassuring.  Just as anesthesia was confirming appropriate level of spinal, FHR dropped to 80's and so we then proceeded in a STAT fashion with use of epidural.  Please see operative report for details of the surgery.    NORBERT BARBER MD

## 2021-08-25 NOTE — PLAN OF CARE
Pushing well with guidance, in stirrups, some early decels. Pos change to left side for pushing purposes, decels, keri 60s , pos changes, call for staff, pit off, bolus admin, baby too high for VE and decision made for c/s, pt gave verbal consent after discussion with Dr. Garcia and had discussed c/s with resident NOC. FHR back to baseline at 7 min with accel and mod [place]. Discussed prep for c/s, info to spouse, expect visit from anes and use of epidural for surgery unless general necessary. Proceed to c/s.

## 2021-08-25 NOTE — PLAN OF CARE
Patient is stable,incisional  pain right now is within the pain scale of 8. Given tylenol and oxy. Abdominal binder in place and using ice around abdomen. She was able to ambulate well to the bathroom with stand by assist and no light headedness noted. Voided spontaneously x 1. Bonding well with baby. Spouse at bedside and very supportive. Will continue with plan of care.

## 2021-08-25 NOTE — PROGRESS NOTES
[facility]  Strip Review Note    O:   Patient Vitals for the past 4 hrs:   BP Temp Temp src Resp SpO2   21 2322 131/82 98.7  F (37.1  C) Oral 18 93 %   21 2300 (!) 125/90 98  F (36.7  C) Oral 16 92 %   21 2234 -- -- -- -- 94 %   21 2200 119/77 99.4  F (37.4  C) Oral 16 93 %   21 2120 (!) 139/90 -- -- 16 93 %   21 2100 -- 99.4  F (37.4  C) Oral -- 96 %   21 130/88 -- -- 16 93 %     FHT: Baseline 120bpm, moderate variability, no accelerations (appears like a sleep cycle, did have good accels 20 min prior), no decelerations  [place]: 4-5 contractions in 10 minutes    A/P:  Ms. Meagan Monroy is a 36 year old  at 39w4d by LMP c/w 9w4d US, here for IOL for AMA.     Induction of Labor   Labor:   - PV miso x1 (194)> 1/50/-2, mckeon 70cc (0050)> mckeon deflated/out, 2-3/70/-2 (0330)> PV miso x1 (452) > 3/80/-2 (0912)> IV Pit on at this time at 10mU/min. Continue to titrate prn.   FWB:    - Category I FHT, reactive and reassuring.  PNC:     - Rh pos, Rubella immune, GBS neg, , GTT 82/156/101/77 (passed), Placenta posterior    Gestational HTN   - Diagnosed intrapartum   - HELLP labs wnl (), UPC 0.22 ()    Exercise-induced Asthma   - Avoid Hemabate.      Rukhsana Chapman MD  ObGyn Resident, PGY-2  2021 12:01 AM

## 2021-08-25 NOTE — PROGRESS NOTES
Patient began pushing about 830, ctx still relatively far apart and mild, so increasing pitocin. Good maternal effort  FHT cat 1    NORBERT BARBER MD

## 2021-08-25 NOTE — PROGRESS NOTES
[facility]ESS NOTE:   2021 6:35 AM         SUBJECTIVE:   Patient comfortable, feels like epidural is working better, less rectal pressure.          OBJECTIVE:     Vitals:    21 2234 21 2300 21 2322 21 0052   BP:  (!) 125/90 131/82 (!) 143/94   Resp:  16 18    Temp:  98  F (36.7  C) 98.7  F (37.1  C)    TempSrc:  Oral Oral    SpO2: 94% 92% 93% 92%       NST:  Fetal Heart Rate Tracin bpm baseline, minimal variability -> moderate variability at 0623 with position change, + accels, early decels  Category 1    Tocometer: q 3-4 minutes    Gen: alert, oriented, no distress  Abdomen:  Gravid, nontender  Cervix: deferred, last check /-1 at 0410         LABS:   No results found for this or any previous visit (from the past 12 hour(s)).           ASSESSMENT / PLAN:   36 year old  at 39w5d admitted for IOL for AMA. Gestational htn diagnosed in labor.    Gest htn: normal to mild range BPs, no s/sx pre -E  Labor: slow progress. Took a break of pitocin due to category 2 tracing, restarted now at 1mu/min. Continue to titrate up per protcol  Fetal well being: Category 1 tracing.   GBS: neg  Pain: epidural      Iris Wiggins MD

## 2021-08-25 NOTE — PLAN OF CARE
Patient arrived to [facility] unit via zoom cart at 1330,with belongings, accompanied by spouse/ significant other, with infant in arms. Received report from Jazmyne Campbell RN and checked bands. Unit and room orientation started. Call light given; no concerns present at this time. Continue with plan of care.

## 2021-08-25 NOTE — PROGRESS NOTES
[facility]  Labor Progress Note    S: Patient doing well, feeling a little more pressure    Patient Vitals for the past 4 hrs:   BP Temp Temp src Resp SpO2   21 0052 (!) 143/94 -- -- -- 92 %   21 2322 131/82 98.7  F (37.1  C) Oral 18 93 %   21 2300 (!) 125/90 98  F (36.7  C) Oral 16 92 %   21 2234 -- -- -- -- 94 %     SVE:  5/80/-2, AROM forebag fluid w/ brown tiny clots vs meconium    FHT: Baseline 125-130bpm, moderate variability, present accelerations, no decelerations  [place]: Not tracing well, 4-6 contractions in 10 minutes    A/P:  Ms. Meagan Monroy is a 36 year old  at 39w5d by LMP c/w 9w4d US, here for IOL for AMA.     Induction of Labor   Labor:   - PV miso x1 ()> 1/50/-2, mckeon 70cc (50)> mckeon deflated/out, 2-3/70/-2 (330)> PV miso x1 (452) > 3/80/-2 (912)> IV Pit on at this time at 10mU/min. S/p SROM and now s/p AROM forebag. Continue to titrate pit prn.   FWB:    - Category I FHT, reactive and reassuring.  PNC:     - Rh pos, Rubella immune, GBS neg, , GTT 82/156/101/77 (passed), Placenta posterior    Gestational HTN   - Diagnosed intrapartum   - HELLP labs wnl (), UPC 0.22 ()    Exercise-induced Asthma   - Avoid Hemabate.      Rukhsana Chapman MD  ObGyn Resident, PGY-2  2021 2:17 AM

## 2021-08-25 NOTE — PROGRESS NOTES
[facility] PROGRESS NOTE:   2021 7:57 PM         SUBJECTIVE:   Patient comfortable with epidural  Contractions:  q 1-3 minutes  Leakage of fluid:  No  Vaginal bleeding:  No  Pain controlled:  Yes           OBJECTIVE:     Vitals:    21   BP:  126/72 137/70    Resp: 16 16 16 16   Temp:    98.4  F (36.9  C)   TempSrc:    Oral   SpO2:  96%  95%         NST:  Fetal Heart Rate Tracin bpm baseline, mod variability, + accels, intermittent decels - 4 late decels while patient was on her back, resolved with position change  Category 2    Tocometer: q 1-3 min minutes    Pitocin at 12mu/min    Gen: alert, oriented, no distress  Abdomen:  Gravid, nontender  Cervix:   Dilation: 4   Effacement: 80%   Station: difficult to assess due to very taught bag of water         LABS:     Recent Results (from the past 12 hour(s))   CBC with platelets    Collection Time: 21  9:45 AM   Result Value Ref Range    WBC Count 13.8 (H) 4.0 - 11.0 10e3/uL    RBC Count 4.50 3.80 - 5.20 10e6/uL    Hemoglobin 13.3 11.7 - 15.7 g/dL    Hematocrit 40.0 35.0 - 47.0 %    MCV 89 78 - 100 fL    MCH 29.6 26.5 - 33.0 pg    MCHC 33.3 31.5 - 36.5 g/dL    RDW 12.4 10.0 - 15.0 %    Platelet Count 238 150 - 450 10e3/uL   ALT    Collection Time: 21  9:45 AM   Result Value Ref Range    ALT 17 0 - 50 U/L   AST    Collection Time: 21  9:45 AM   Result Value Ref Range    AST 19 0 - 45 U/L   Creatinine    Collection Time: 21  9:45 AM   Result Value Ref Range    Creatinine 0.61 0.52 - 1.04 mg/dL    GFR Estimate >90 >60 mL/min/1.73m2              ASSESSMENT / PLAN:   36 year old  at 39w4d admitted for IOL for AMA. Gestational htn.    Gest htn: normal to mild range BPs. No s/sx pre-E. Normal HELLP labs this morning.  Labor: continue pitocin augmentation, AROM when able. Discussed unable to adequately assess station due to taught bag of water at this time.    Fetal well being: Category 2 tracing due to intermittent decels, resolved with position change, accels and mod variability are reassuring, continue to monitor closely  GBS: neg  Pain: epidural  Anticipate .    Iris Wiggins MD

## 2021-08-25 NOTE — ANESTHESIA POSTPROCEDURE EVALUATION
Patient: Meagan Monroy    Procedure(s):   SECTION    Diagnosis:* No pre-op diagnosis entered *  Diagnosis Additional Information: No value filed.    Anesthesia Type:  Epidural    Note:  Disposition: Inpatient   Postop Pain Control: Uneventful            Sign Out: Well controlled pain   PONV: No   Neuro/Psych: Uneventful            Sign Out: Acceptable/Baseline neuro status   Airway/Respiratory: Uneventful            Sign Out: Acceptable/Baseline resp. status   CV/Hemodynamics: Uneventful            Sign Out: Acceptable CV status; No obvious hypovolemia; No obvious fluid overload   Other NRE: NONE   DID A NON-ROUTINE EVENT OCCUR? No           Last vitals:  Vitals Value Taken Time   /94 21 1300   Temp 37  C (98.6  F) 21 1230   Pulse 104 21 1309   Resp 20 21 1150   SpO2 97 % 21 1309   Vitals shown include unvalidated device data.    Electronically Signed By: Tarun Hopson MD  2021  1:44 PM

## 2021-08-25 NOTE — DISCHARGE SUMMARY
[facility] Discharge Summary    Meagan Monroy MRN# [id number]   Age: 36 year old YOB: 1985     Date of Admission:  2021  Date of Discharge::  2021  Admitting Physician:  Florence Snyder MD  Discharge Physician:  Loni Hollis MD            Admission Diagnoses:   -  at 39w5d  - Asthma, exercise-induced  - GHTN (dx'd intrapartum)  - Category II FHT Remote from Delivery           Discharge Diagnosis:   - Postpartum, s/p delivery of viable infant         Procedures:     Procedure(s): - Primary low transverse  section  - Epidural anesthesia  - Electronic fetal monitoring  - TAP block             Medications Prior to Admission:     Medications Prior to Admission   Medication Sig Dispense Refill Last Dose     Ascorbic Acid (VITAMIN C PO) Take 250 mg by mouth daily   2021 at Unknown time     ASPIRIN 81 PO    2021 at Unknown time     Cetirizine HCl (ZYRTEC PO)    2021 at Unknown time     Cyanocobalamin (VITAMIN B 12 PO) Take 1,000 mcg by mouth daily   2021 at Unknown time     ferrous sulfate (FEROSUL) 325 (65 Fe) MG tablet Take 325 mg by mouth every other day   2021 at Unknown time     Prenatal Vit-Fe Fumarate-FA (PRENATAL MULTIVITAMIN W/IRON) 27-0.8 MG tablet Take 1 tablet by mouth daily 90 tablet 3 2021 at Unknown time     acetaminophen (TYLENOL) 325 MG tablet Take 2 tablets (650 mg) by mouth every 6 hours as needed for mild pain Start after Delivery.   Unknown at Unknown time     albuterol (PROAIR HFA/PROVENTIL HFA/VENTOLIN HFA) 108 (90 Base) MCG/ACT inhaler Inhale 1-2 puffs into the lungs    More than a month at Unknown time     augmented betamethasone dipropionate (DIPROLENE-AF) 0.05 % external ointment Apply a thin layer to affected skin twice a day for up to 2 weeks (avoid face, body folds)   More than a month at Unknown time     beclomethasone HFA (QVAR REDIHALER) 40 MCG/ACT inhaler Inhale 40 mcg into the lungs    More than a month at  Unknown time     ibuprofen (ADVIL/MOTRIN) 200 MG tablet Take 3 tablets (600 mg) by mouth every 6 hours as needed for moderate pain Start after delivery   Unknown at Unknown time     senna-docusate (SENOKOT-S/PERICOLACE) 8.6-50 MG tablet Take 1 tablet by mouth daily Start after delivery.   Unknown at Unknown time             Discharge Medications:        Review of your medicines      CONTINUE these medicines which have NOT CHANGED      Dose / Directions   acetaminophen 325 MG tablet  Commonly known as: TYLENOL      Dose: 650 mg  Take 2 tablets (650 mg) by mouth every 6 hours as needed for mild pain Start after Delivery.  Refills: 0     albuterol 108 (90 Base) MCG/ACT inhaler  Commonly known as: PROAIR HFA/PROVENTIL HFA/VENTOLIN HFA      Dose: 1-2 puff  Inhale 1-2 puffs into the lungs  Refills: 0     ASPIRIN 81 PO      Refills: 0     augmented betamethasone dipropionate 0.05 % external ointment  Commonly known as: DIPROLENE-AF      Apply a thin layer to affected skin twice a day for up to 2 weeks (avoid face, body folds)  Refills: 0     beclomethasone HFA 40 MCG/ACT inhaler  Commonly known as: QVAR REDIHALER      Dose: 40 mcg  Inhale 40 mcg into the lungs  Refills: 0     ferrous sulfate 325 (65 Fe) MG tablet  Commonly known as: FEROSUL      Dose: 325 mg  Take 325 mg by mouth every other day  Refills: 0     ibuprofen 200 MG tablet  Commonly known as: ADVIL/MOTRIN      Dose: 600 mg  Take 3 tablets (600 mg) by mouth every 6 hours as needed for moderate pain Start after delivery  Refills: 0     prenatal multivitamin w/iron 27-0.8 MG tablet  Used for: Pregnancy test performed, pregnancy confirmed      Dose: 1 tablet  Take 1 tablet by mouth daily  Quantity: 90 tablet  Refills: 3     senna-docusate 8.6-50 MG tablet  Commonly known as: SENOKOT-S/PERICOLACE      Dose: 1 tablet  Take 1 tablet by mouth daily Start after delivery.  Refills: 0     VITAMIN B 12 PO      Dose: 1,000 mcg  Take 1,000 mcg by mouth daily  Refills: 0      VITAMIN C PO      Dose: 250 mg  Take 250 mg by mouth daily  Refills: 0     ZYRTEC PO      Refills: 0                  Consultations:   Anesthesiology  NICU          Brief History of Admission and Procedure:   Meagan Monroy is a 36 year old now  patient initially at 39w5d who presented for elective induction of labor. Pregnancy complicated by asthma and gestational hypertension (dx during intrapartum phase) - HELLP labs normal at admission. Her induction course included use of misoprostol for cervical ripening and Samuel balloon placement. She ultimately had SROM and was augmented with IV Pitocin. The patient progressed to complete cervical dilation and was attempting to push when FHT was significant for prolonged ongoing fetal bradycardia with FHR in the 70-80bpm range. Given these findings, the patient underwent an emergent  section. Discussed with patients that this procedure involves the material risk of infection, life-threatening bleeding, and injury to surrounding structures. In particular, discussed the risks of damage to the bowel, bladder, uterus, Fallopian tubes, ovaries, and/or major blood vessels. Patient voices understanding of these risks and agrees to proceed. Informed consent performed, consents signed.     The procedure was uncomplicated - QBL 548cc. Findings as follows:   A single liveborn male infant weighing 3.09kg with Apgars pending - cord gas pH 7.03. Baby limp with with minimal respiratory effort at delivery, cord clamped and cut immediately and handed to waiting NICU team.  Delivery initially attempted in cephalic presentation with compound hand apparent - however with elevation of fetal head to hysterotomy, fetus changed to piter breech presentation. Infant delivered thus in piter breech. Normal appearing uterus, fallopian tubes, ovaries.  No nuchal cord.  Clear amniontic fluid. No abdominal wall adhesions. No intraabdominal adhesions          Postpartum Hospital Course:    The patient's postpartum course was unremarkable.  On discharge, her pain was well controlled. Vaginal bleeding is similar to peak menstrual flow.  Voiding without difficulty.  Ambulating well and tolerating a normal diet.  No fever.  Breastfeeding well.  Infant is stable.  She was discharged on post-partum day #3.    Post-partum hemoglobin: 11    Contraception: OCP    Rhogam was not indicated          Discharge Instructions and Follow-Up:     Discharge diet: Regular   Discharge activity: Activity as tolerated   Discharge follow-up: Follow up with your primary OBGYN provider in six weeks for a routine postpartum visit and in 1 week for a BP check    Wound care: Drink plenty of fluids  Ice to area for comfort  Keep wound clean and dry            Discharge Disposition:     Discharged to home      Discharge Staff: Telma Pena MD MPH  OB/Gyn PGY-3  08/28/21 9:04 AM

## 2021-08-25 NOTE — PROGRESS NOTES
[facility] PROGRESS NOTE:   2021 2:53 AM         SUBJECTIVE:   Patient resting comfortably. No pressure or urge to push. Feeling contractions high in her abdomen but not painful.          OBJECTIVE:     Vitals:    21 2234 21 2300 21 2322 21 0052   BP:  (!) 125/90 131/82 (!) 143/94   Resp:  16 18    Temp:  98  F (36.7  C) 98.7  F (37.1  C)    TempSrc:  Oral Oral    SpO2: 94% 92% 93% 92%         NST:  Fetal Heart Rate Tracin bpm baseline, minimal variability, no accels, early decels  Category 2    Tocometer: q 2-3 minutes    Gen: alert, oriented, no distress  Abdomen:  Gravid, nontender  Cervix: 5/80/-2 at 0200, not rechecked         LABS:   No results found for this or any previous visit (from the past 12 hour(s)).           ASSESSMENT / PLAN:   36 year old  at 39w5d admitted for IOL for AMA.    Labor: s/p yessyomike miso. On pitocin since  1130a. Currently at 12mu/min. S/p SROM 2120, AROM of forebag 0. Continue pitocin augmentation  Fetal well being: Category 2 tracing due to minimal variability, could be sleep cycle as has only been 20 min, just changed patient's position, will continue to monitor closely  GBS: neg  Pain: epidural working well  Gestational htn: normal to mildly elevated BP. No s/sx pre E    Iris Wiggins MD

## 2021-08-25 NOTE — PROVIDER NOTIFICATION
Provider notification:  Provider Name: Adela PATEL Notified at 0341 regarding a persistent category II fetal heart rate tracing for 30-45 minutes.   Baseline rate 120, normal  Variability minimal  Accelerations present  Decelerations present, deceleration type: late , deceleration frequency: recurrent    EFM interpretation suggests no concern for fetal metabolic acidemia at this time due to moderate    Uterine Activity normal/regular.    Interventions to improve fetal oxygenation for a category II tracing include:maternal positioning, IV fluid bolus , discontinue oxytocin , consult Category 2 algorithm, evaluate labor progress, sterile vaginal exam and blood pressure check    After discussion with provider:Plan reassessment in 30 minutes

## 2021-08-25 NOTE — PROGRESS NOTES
Brief progress note    Called to room by bedside RN for cat II FHT  0344: pit turned off  FHT baseline 125, mod variability, no accels, present decels- recurrent late decels  [place]: 5-6 ctx in 10 min  Patient complains of rectal pressure, mild. Cvx 5/90/-1, slight change in effacement and station from check 2 hours prior w/ AROM    Pt repositioned and IVF bolus total 500ml given with improvement in FHT.  0410 FHT baseline 120, minimal to mod variability, no accels, present decels- intermittent late and early  [place]: 4 ctx in 10 min    Per algorithm can observe minimal variability withOUT significant decels for 1 hour and consider c/s if persistent. As ctx space, late decels are replaced by early decels and variability more frequently moderate.    Plan: observe FHT. If category 1 for 20-30 min will resume pit. Per protocol cannot restart at half dose so will start back at zero and titrate. Even with pitocin off now x30 min, continues to contract 4-5 in 10 min.    Rukhsana Chapman MD  ObGyn Resident, PGY-2  August 25, 2021 4:23 AM

## 2021-08-25 NOTE — PLAN OF CARE
Received good pain relief with epidural. Currently elizabeth every 2-3 min on 10 mu pitocin and contractions palpate moderate quality. FHT's 125 with moderate variability, accelerations and occasional variable decelerations. Last temp 99.4. BP non severe range. Denies HA, visual changes and no epigastric pain. SROM clear fluid. Plan .

## 2021-08-25 NOTE — ANESTHESIA PROCEDURE NOTES
TAP Procedure Note  Pre-Procedure   Staff -        Anesthesiologist:  Tarun Hopson MD       Resident/Fellow: Will Jesus MD       Performed By: resident       Location: OR       Pre-Anesthestic Checklist: patient identified, IV checked, risks and benefits discussed, informed consent, monitors and equipment checked, pre-op evaluation, at physician/surgeon's request and post-op pain management  Timeout:       Correct Patient: Yes        Correct Procedure: Yes        Correct Site: Yes        Correct Position: Yes        Correct Laterality: Yes        Site Marked: N/A  Procedure Documentation  Procedure: TAP       Diagnosis: POST-OP PAIN CONTROL       Laterality: bilateral       Patient Position: supine       Skin prep: Chloraprep       Needle Type: UberGrapey needle       Needle Gauge: 21.        Needle Length (Inches): 3.13        Ultrasound guided       1. Ultrasound was used to identify targeted nerve, plexus, vascular marker, or fascial plane and place a needle adjacent to it in real-time.       2. Ultrasound was used to visualize the spread of anesthetic in close proximity to the above referenced structure.       3. A permanent image is entered into the patient's record.       4. The visualized anatomic structures appeared normal.       5. There were no apparent abnormal pathologic findings.    Assessment/Narrative         The placement was negative for: blood aspirated, painful injection and site bleeding       Paresthesias: No.      Bolus given via needle. No blood aspirated via catheter.        Secured via.        Insertion/Infusion Method: Single Shot       Complications: none    Medication(s) Administered   Bupivacaine 0.25% PF (Infiltration), 20 mL  Bupivacaine liposome (Exparel) 1.3% LA inj susp (Infiltration), 20 mL  Medication Administration Time: 8/25/2021 11:12 AM

## 2021-08-25 NOTE — OP NOTE
Meagan Monroy    1985   MRN [id number]    Date of Operation:  2021     Surgeon: Julissa Garcia MD     Assistants: Holly Rutherford MD PGY2    Pre-operative diagnosis:   -  at 39w5d   - Asthma, exercise-induced  - GHTN (dx'd intrapartum)    Post-operative diagnosis:   - , delivered via below stated procedure  - Same as above    Procedures: Primary lower transverse  section with double layer uterine closure via Pfannenstiel incision    Anesthesia: Epidural anesthesia     QBL: 548cc  IVF: 130cccc  UO: 500cc    Indications: Meagan Monroy is a 36 year old now  patient initially at 39w5d who presented for elective induction of labor. Pregnancy complicated by asthma and gestational hypertension (dx during intrapartum phase) - HELLP labs normal at admission. Her induction course included use of misoprostol for cervical ripening and Samuel balloon placement. She ultimately had SROM and was augmented with IV Pitocin. The patient progressed to complete cervical dilation and was attempting to push when FHT was significant for prolonged ongoing fetal bradycardia with FHR in the 70-80bpm range. Given these findings, the patient underwent an emergent  section. Discussed with patients that this procedure involves the material risk of infection, life-threatening bleeding, and injury to surrounding structures. In particular, discussed the risks of damage to the bowel, bladder, uterus, Fallopian tubes, ovaries, and/or major blood vessels. Patient voices understanding of these risks and agrees to proceed. Informed consent performed, consents signed.     Complications: None apparent    Findings: A single liveborn male infant weighing 3.09kg with Apgars pending - cord gas pH 7.03. Baby limp with with minimal respiratory effort at delivery, cord clamped and cut immediately and handed to waiting NICU team.  Delivery initially attempted in cephalic presentation with compound hand apparent -  however with elevation of fetal head to hysterotomy, fetus changed to piter breech presentation. Infant delivered thus in piter breech. Normal appearing uterus, fallopian tubes, ovaries.  No nuchal cord.  Clear amniontic fluid. No abdominal wall adhesions. No intraabdominal adhesions    Specimens: Cord blood/segment     Procedure Details:  The patient was taken back to the operating room where fetal heart tones were noted to be back in the 120's.  Her epidural was bolused while monitoring FHR.  Fetal pillow and mckeon catheter were placed, and then fetal heart rate gain dropped to 80's, so procedure reverted to stat and betadine prep was performed. She was then prepped and draped in the usual sterile fashion in the dorsal supine position with a leftward tilt. A time out was performed. A pfannenstiel skin incision was made with the scalpel and carried through the underlying layer to the fascia. The fascia was incised in the midline and extended laterally with Garnett scissors. The superior aspect of the fascial incision was grasped with Kocher clamps, elevated and the underlying rectus muscles were dissected off bluntly and with Garnett scissors. Attention was then turned to the inferior aspect of the incision, which, in a similar fashion was grasped, tented up with Kocher clamps, and the rectus muscle dissected off bluntly and with Garnett scissors. The rectus muscles were then  in the midline. The peritoneum was then identified and entered bluntly. This was extended with sharp and blunt dissection. The bladder blade was then inserted. The lower uterine segment was incised in a transverse fashion with the scalpel. The incision was extended digitally. The bladder blade was removed. The infant's head was initially lifted to the hysterotomy, at which time the baby flipped to piter breech presentation. The infant was thus delivered in piter breech presentation - the fetal sacrum was brought to the hysterotomy in an  anterior position, then lower extremities delivered. The trunk and upper extremities followed. Lastly the fetal head was flexed and lifted through the hysterotomy. The baby was limp and had minimal respiratory effort, so cord was clamped and cut and then the baby was handed off to the NICU staff. A segment of cord was taken for cord gases. The placenta was then removed with gentle traction on the cord and fundal message. The uterus was left in situ and cleared of all clots and debris. The uterine incision was repaired with 0-Vicryl in a running locked fashion. A second layer of 0-Monocryl was used to imbricate the incision. The incision was noted to be hemostatic. The gutters were then cleared of clots. A kocher clamp was used to elevated the fascia superiorly and inferiorly and good hemostasis was noted. The fascia was closed with 0-vicryl in a running fashion. The subcutaneous tissue was irrigated and areas of bleeding were controlled with cautery. The subcutaneous tissue was less than 2cm and thus not closed. The skin was closed with 4-0 Monocryl in a subcuticular fashion. The patient tolerated the procedure well and was taken to the recovery room in stable condition. All lap, instrument, and sharps counts were correct times two. Dr. Garcia was present for nash portions of the procedure.     Holly Rutherford MD  Ob/Gyn Resident, PGY-2  08/25/21 11:34 AM     I was scrubbed and present for entire procedure, agree with above operative note.    NORBERT GARCIA MD

## 2021-08-25 NOTE — PROGRESS NOTES
Brief progress note    FHT Cat 1 now x1 hour, and even prior to that Cat II for minimal variability but just early decels, no late decels.    Discussed w/ bedside RN to turn pitocin back on, resumed at 0545    Discussed with patient the possibility this episode of fetal distress could be sign of fetal intolerance of labor. If FHT resumes to category II without labor progress we may recommend a c/s. Pt understanding and open to what is needed for healthy mom and baby but would like to continue with induction and try for vaginal delivery, which is reasonable at this time. Did discuss the c/s procedure and risks of surgery and recovery expectations.     Rukhsana Chapman MD  ObGyn Resident, PGY-2  August 25, 2021 6:03 AM

## 2021-08-25 NOTE — PROGRESS NOTES
Called to room for decels.  About 7 min total, with 2-3 2-3min decels to 90's.  Up to 110's upon entry.  Started with straight cath.  Prior to decel  with mod eugene and accels. Per resident exam, 10/100/0.  Will allow baby to recover and then begin pushing.    Discussed likely need to restart pitocin while pushing and potential for baby to not tolerate pushing with or without pitocin.  We discussed if baby was low enough and cat 2, could consider VE, but c/s may be needed.  Patient verbalized understanding, agreed with plan and would like to begin pushing.    NORBERT BARBER MD

## 2021-08-25 NOTE — ADDENDUM NOTE
Addendum  created 08/25/21 1403 by Will Jesus MD    Child order released for a procedure order, Clinical Note Signed, Flowsheet accepted, Flowsheet data copied forward, Intraprocedure Blocks edited, Intraprocedure Event edited, Intraprocedure Flowsheets edited, Pend clinical note

## 2021-08-25 NOTE — PLAN OF CARE
VSS. Afebrile. Pt continues to leak clear fluid this AM. States feeling contractions in left hip occasionally. No vaginal/rectal pressure at this time. Cat 2 strip overnight- see previous note. FHR occasionally has low baseline (see flowsheets for EFM/TOCO details. Plan per provider is to continue with pitocin (see MAR).

## 2021-08-26 LAB — HGB BLD-MCNC: 11 G/DL (ref 11.7–15.7)

## 2021-08-26 PROCEDURE — 120N000002 HC R&B MED SURG/OB UMMC

## 2021-08-26 PROCEDURE — 85018 HEMOGLOBIN: CPT | Performed by: STUDENT IN AN ORGANIZED HEALTH CARE EDUCATION/TRAINING PROGRAM

## 2021-08-26 PROCEDURE — 250N000013 HC RX MED GY IP 250 OP 250 PS 637: Performed by: STUDENT IN AN ORGANIZED HEALTH CARE EDUCATION/TRAINING PROGRAM

## 2021-08-26 PROCEDURE — 36415 COLL VENOUS BLD VENIPUNCTURE: CPT | Performed by: STUDENT IN AN ORGANIZED HEALTH CARE EDUCATION/TRAINING PROGRAM

## 2021-08-26 PROCEDURE — 250N000011 HC RX IP 250 OP 636: Performed by: STUDENT IN AN ORGANIZED HEALTH CARE EDUCATION/TRAINING PROGRAM

## 2021-08-26 RX ORDER — LIDOCAINE 4 G/G
1 PATCH TOPICAL
Status: DISCONTINUED | OUTPATIENT
Start: 2021-08-26 | End: 2021-08-28 | Stop reason: HOSPADM

## 2021-08-26 RX ORDER — OXYCODONE HYDROCHLORIDE 5 MG/1
5-10 TABLET ORAL EVERY 4 HOURS PRN
Status: DISCONTINUED | OUTPATIENT
Start: 2021-08-26 | End: 2021-08-28 | Stop reason: HOSPADM

## 2021-08-26 RX ADMIN — KETOROLAC TROMETHAMINE 30 MG: 30 INJECTION, SOLUTION INTRAMUSCULAR; INTRAVENOUS at 02:06

## 2021-08-26 RX ADMIN — OXYCODONE HYDROCHLORIDE 5 MG: 5 TABLET ORAL at 12:26

## 2021-08-26 RX ADMIN — OXYCODONE HYDROCHLORIDE 5 MG: 5 TABLET ORAL at 12:00

## 2021-08-26 RX ADMIN — OXYCODONE HYDROCHLORIDE 5 MG: 5 TABLET ORAL at 06:53

## 2021-08-26 RX ADMIN — SIMETHICONE 80 MG: 80 TABLET, CHEWABLE ORAL at 18:53

## 2021-08-26 RX ADMIN — LIDOCAINE 1 PATCH: 246 PATCH TOPICAL at 19:59

## 2021-08-26 RX ADMIN — IBUPROFEN 800 MG: 800 TABLET, FILM COATED ORAL at 14:05

## 2021-08-26 RX ADMIN — ACETAMINOPHEN 975 MG: 325 TABLET, FILM COATED ORAL at 14:05

## 2021-08-26 RX ADMIN — OXYCODONE HYDROCHLORIDE 5 MG: 5 TABLET ORAL at 16:55

## 2021-08-26 RX ADMIN — ACETAMINOPHEN 975 MG: 325 TABLET, FILM COATED ORAL at 08:02

## 2021-08-26 RX ADMIN — SIMETHICONE 80 MG: 80 TABLET, CHEWABLE ORAL at 02:36

## 2021-08-26 RX ADMIN — DOCUSATE SODIUM AND SENNOSIDES 2 TABLET: 8.6; 5 TABLET ORAL at 08:02

## 2021-08-26 RX ADMIN — IBUPROFEN 800 MG: 800 TABLET, FILM COATED ORAL at 19:59

## 2021-08-26 RX ADMIN — ACETAMINOPHEN 975 MG: 325 TABLET, FILM COATED ORAL at 19:59

## 2021-08-26 RX ADMIN — OXYCODONE HYDROCHLORIDE 5 MG: 5 TABLET ORAL at 02:18

## 2021-08-26 RX ADMIN — OXYCODONE HYDROCHLORIDE 5 MG: 5 TABLET ORAL at 08:02

## 2021-08-26 RX ADMIN — ACETAMINOPHEN 975 MG: 325 TABLET, FILM COATED ORAL at 02:06

## 2021-08-26 RX ADMIN — IBUPROFEN 800 MG: 800 TABLET, FILM COATED ORAL at 08:02

## 2021-08-26 RX ADMIN — OXYCODONE HYDROCHLORIDE 5 MG: 5 TABLET ORAL at 22:04

## 2021-08-26 RX ADMIN — DOCUSATE SODIUM AND SENNOSIDES 2 TABLET: 8.6; 5 TABLET ORAL at 18:52

## 2021-08-26 RX ADMIN — SIMETHICONE 80 MG: 80 TABLET, CHEWABLE ORAL at 12:38

## 2021-08-26 NOTE — PLAN OF CARE
Patient is stable, pain rating scale is 6-7 and taking oxycodone  5-10 mg, motrin and tylenol.  She also uses  ice on the area which seems to work. Declined lidocaine patch until after shower. Breastfeeding with minimal assist. Encouraged to call for breastfeeding help and will start pumping this afternoon.

## 2021-08-26 NOTE — PROGRESS NOTES
Post Partum Progress Note  PPD#1    Subjective:  She is resting comfortably in bed this morning. She is comfortable now, but her incisional pain was quite bad yesterday evening, working with her RN on best medication regimen. She is tolerating PO intake. Lochia present and lightening up.  She is voiding without difficulty. She has passed flatus and has not yet had a BM. She is ambulating without dizziness or difficulty.  She denies headache, changes in vision, nausea/vomiting, chest pain, shortness of breath, RUQ pain, or worsening edema.  Plans to breastfeed.    Objective:  Patient Vitals for the past 24 hrs:   BP Temp Temp src Pulse Resp SpO2   21 0600 127/89 97.8  F (36.6  C) Axillary 81 18 98 %   21 0000 128/85 97.9  F (36.6  C) Axillary 85 17 98 %   21 2235 (!) 129/92 -- -- 90 16 98 %   21 1900 (!) 136/91 -- -- 88 16 97 %   21 1800 (!) 145/93 -- -- 83 16 98 %   21 1545 137/87 98.3  F (36.8  C) Axillary 88 16 98 %   21 1451 (!) 143/94 -- -- 91 16 97 %   21 1417 (!) 143/90 98.9  F (37.2  C) Axillary 84 18 98 %   21 1341 (!) 134/90 -- -- 88 18 96 %   21 1300 (!) 136/94 -- -- 96 -- 98 %   21 1245 133/89 -- -- 82 -- 99 %   21 1230 122/85 98.6  F (37  C) Axillary 86 -- 99 %   21 1215 115/85 -- -- 87 -- 100 %   21 1201 130/86 -- -- 96 -- 99 %   21 1150 117/76 -- -- 95 20 98 %   21 1120 110/74 -- -- 84 18 97 %   ]    General: NAD. A&Ox3.  CV: Regular rate, well perfused.   Pulm: Normal respiratory effort.  Abd: Soft, non-tender, non-distended. Fundus is firm and below the umbilicus.    Incision: covered in sterile bandage that is half shadowed with blood/serosanguinous fluid  Ext: trace lower extremity edema bilaterally. No calf tenderness.    Assessment/Plan:  Meagan Monroy is a 36 year old  female who is POD#1 s/p PLTCS for category II FHT, currently recovering well from surgery    Routine postpartum care  -  Encourage routine post-operative goals including ambulation and incentive spirometry  - PNC: Rh positive. Rubella immune. No intervention indicated.  - Pain: controlled on oral medications  - Heme: Hgb 13.3>>11  - GI: continue anti-emetics and stool softeners as needed.  - : Voiding spontaneously.  - Infant: Stable in room  - Feeding: Plans on breastfeeding.  - BC: not discussed    GHTN  - HELLP labs wnl   - BP normal to low mild range  - no s/sx preeclampsia at this time    Discharge to home on POD#2-3 pending postpartum goals    Rukhsana Chapman MD  ObGyn Resident, PGY2   2021 6:59 AM    Physician Attestation   I, Florence Snyder MD, personally examined and evaluated this patient.  I discussed the patient with the resident/fellow and care team, and agree with the assessment and plan of care as documented in the note of 21.      I personally reviewed vital signs, medications and labs.    Gutierrez findings: Meagan Monroy is a 36 year old  s/p PLTCS for category 2 FHT remote from delivery, currently recovering well from surgery.  Patient reports pain is getting better controlled with increase in oxycodone.  Voiding without issues.  Ambulating without dizziness and tolerating regular diet.  Passing flatus.  Post op Hgb appropriate decrease with delivery blood loss.  Denies any HA, changes in vision, chest pain, chest pressure, or shortness of breath.  BP normal to mild range.  Discussed procedure yesterday and TAP block as well as postpartum goals for discharge and post op activity restrictions.  Working on breast feeding and pain control today.    Florence Snyder MD  Date of Service (when I saw the patient): 21

## 2021-08-26 NOTE — PLAN OF CARE
Data: BP slightly elevated, otherwise all other vital signs within normal limits. Postpartum checks within normal limits - fundus firm at U/1. Patient eating and drinking normally. Patient able to empty bladder independently and is up ambulating. No apparent signs of infection. UTV incision; dressing marked. Patient performing self cares and is able to care for infant.  Action: Patient medicated during the shift for pain and cramping. Patient also having intense gas pains; using simethicone and heat and encouraged ambulation. See MAR.  Response: Positive attachment behaviors observed with infant. Support person, , present.   Plan: Continue with plan of care.

## 2021-08-26 NOTE — PROVIDER NOTIFICATION
08/25/21 2149   Provider Notification   Provider Name/Title G2   Method of Notification Electronic Page   Request Evaluate in Person   Notification Reason Medication Request     Hey pt is c/o pain 9/10. It is incisional and sounds like gas. She is doing simethicone and heat packs. Taking tylenol, ibuprofen, oxycodone. Is it possible to up the oxycodone to 5-10 mg? Thank you!

## 2021-08-26 NOTE — PLAN OF CARE
[id number]. Pt vital signs and assessment findings normal. Bleeding is scant fundus firm. Pt incision dressing in place with marked dried blood, pt planning to shower this evening for removal of dressing. Pt pain is present at incision and general abdominal discomfort. Pt is taking ibuprofen, tylenol, and oxycodone. Encouraged ambulation, fluids, heat, and simethicone/senna for bowels. Pt is tolerating regular diet. Up ad bentley. Pt is breastfeeding with assist mostly in positioning, LC already released. Pt reports pump at home. Bonding well with  and attentive to his cues. Supported by spouse at bedside. Continue to monitor.

## 2021-08-26 NOTE — PLAN OF CARE
Data: Vital signs and postpartum checks WDL  Patient eating and drinking normally. Patient able to empty bladder independently and is up ambulating.  Patient performing self cares and is able to care for infant. Breastfeeding on demand with assist.  Action: Patient medicated during the shift for pain with Tylenol, Oxycodone and Toradol with relief after 1 hour. Patient education done see education record.  Response: Positive attachment behaviors observed with infant. Support persons  present.    Plan: Continue with the plan of care

## 2021-08-27 PROCEDURE — 250N000013 HC RX MED GY IP 250 OP 250 PS 637: Performed by: STUDENT IN AN ORGANIZED HEALTH CARE EDUCATION/TRAINING PROGRAM

## 2021-08-27 PROCEDURE — 120N000002 HC R&B MED SURG/OB UMMC

## 2021-08-27 RX ADMIN — SIMETHICONE 80 MG: 80 TABLET, CHEWABLE ORAL at 21:03

## 2021-08-27 RX ADMIN — SIMETHICONE 80 MG: 80 TABLET, CHEWABLE ORAL at 00:11

## 2021-08-27 RX ADMIN — OXYCODONE HYDROCHLORIDE 5 MG: 5 TABLET ORAL at 20:00

## 2021-08-27 RX ADMIN — DOCUSATE SODIUM AND SENNOSIDES 2 TABLET: 8.6; 5 TABLET ORAL at 20:01

## 2021-08-27 RX ADMIN — OXYCODONE HYDROCHLORIDE 5 MG: 5 TABLET ORAL at 07:58

## 2021-08-27 RX ADMIN — SIMETHICONE 80 MG: 80 TABLET, CHEWABLE ORAL at 07:58

## 2021-08-27 RX ADMIN — ACETAMINOPHEN 975 MG: 325 TABLET, FILM COATED ORAL at 15:03

## 2021-08-27 RX ADMIN — IBUPROFEN 800 MG: 800 TABLET, FILM COATED ORAL at 15:03

## 2021-08-27 RX ADMIN — IBUPROFEN 800 MG: 800 TABLET, FILM COATED ORAL at 02:58

## 2021-08-27 RX ADMIN — SIMETHICONE 80 MG: 80 TABLET, CHEWABLE ORAL at 15:03

## 2021-08-27 RX ADMIN — DOCUSATE SODIUM AND SENNOSIDES 2 TABLET: 8.6; 5 TABLET ORAL at 07:58

## 2021-08-27 RX ADMIN — OXYCODONE HYDROCHLORIDE 5 MG: 5 TABLET ORAL at 16:04

## 2021-08-27 RX ADMIN — LIDOCAINE 1 PATCH: 246 PATCH TOPICAL at 20:05

## 2021-08-27 RX ADMIN — OXYCODONE HYDROCHLORIDE 5 MG: 5 TABLET ORAL at 12:09

## 2021-08-27 RX ADMIN — ACETAMINOPHEN 975 MG: 325 TABLET, FILM COATED ORAL at 21:01

## 2021-08-27 RX ADMIN — IBUPROFEN 800 MG: 800 TABLET, FILM COATED ORAL at 09:05

## 2021-08-27 RX ADMIN — ACETAMINOPHEN 975 MG: 325 TABLET, FILM COATED ORAL at 02:58

## 2021-08-27 RX ADMIN — ACETAMINOPHEN 975 MG: 325 TABLET, FILM COATED ORAL at 09:04

## 2021-08-27 RX ADMIN — IBUPROFEN 800 MG: 800 TABLET, FILM COATED ORAL at 21:01

## 2021-08-27 RX ADMIN — OXYCODONE HYDROCHLORIDE 5 MG: 5 TABLET ORAL at 02:58

## 2021-08-27 NOTE — PROGRESS NOTES
Post Partum Progress Note  PPD#2    Subjective:  She is resting comfortably in bed this morning. She complains of right sided incisional drainage. She removed her bandage yesterday and showered, however today she noted a small bit of serosanguinous bloody drainage from right side incision. It was just a small amount and does not seem to be ongoing. Pain is improving and well controlled on current medication regimen. She is tolerating PO intake. Lochia present and similar to menses.  She is voiding without difficulty. She has passed flatus and has not yet had a BM. She is ambulating without dizziness or difficulty.  She denies headache, changes in vision, nausea/vomiting, chest pain, shortness of breath, RUQ pain, or worsening edema. Breastfeeding is going well. Desires OCPs for birth control.    Objective:  Patient Vitals for the past 24 hrs:   BP Temp Temp src Pulse Resp SpO2   21 0805 (!) 143/85 98.2  F (36.8  C) Oral 78 -- --   21 1514 107/64 97.7  F (36.5  C) Oral 67 16 --   21 1030 122/83 98.3  F (36.8  C) Axillary 83 16 98 %     General: NAD. A&Ox3.  CV: Regular rate, well perfused.   Pulm: Normal respiratory effort.  Abd: Soft, non-tender, non-distended. Fundus is firm and below the umbilicus.    Incision: c/d/i, right side incision with serosang shadowing on steristrips, no fresh bleeding  Ext: trace lower extremity edema bilaterally. No calf tenderness.    Assessment/Plan:  Meagan Monroy is a 36 year old  female who is POD#2 s/p PLTCS for category II FHT, currently recovering well from surgery    Routine postpartum care  - Encourage routine post-operative goals including ambulation and incentive spirometry  - PNC: Rh positive. Rubella immune. No intervention indicated.  - Pain: controlled on oral medications  - Heme: Hgb 13.3>>11  - GI: continue anti-emetics and stool softeners as needed.  - : Voiding spontaneously.  - Infant: Stable in room  - Feeding: Plans on  breastfeeding.  - BC: OCPs, would like to get from primary OB provider    GHTN  - HELLP labs wnl 8/24  - BP normal to low mild range  - no s/sx preeclampsia at this time    Discharge to home tomorrow pending postpartum goals    Rukhsana Chapman MD  ObGyn Resident, PGY2   8/27/2021 8:15 AM      Physician Attestation   I, Melanie Pride, personally saw and evaluated Meagan.  I have reviewed and discussed with the resident their plan.    She is doing well. Has no questions regarding delivery.    Working on breast feeding.  Met with lactation specialist. Not ready for discharge today.  Reviewed pain medication regimen.  Still trying to get pain under better control.    Hemoglobin   Date Value Ref Range Status   08/26/2021 11.0 (L) 11.7 - 15.7 g/dL Final   08/24/2021 13.3 11.7 - 15.7 g/dL Final   07/08/2021 11.5 (L) 11.7 - 15.7 g/dL Final   05/25/2021 10.8 (L) 11.7 - 15.7 g/dL Final   discussed normal expectations for post c/s care.  Plan discharge home tomorrow.   Melanie Pride MD  Date of Service (when I saw the patient): August 27, 2021

## 2021-08-27 NOTE — PLAN OF CARE
Data: Vital signs and postpartum checks WDL  Patient eating and drinking normally. Patient able to empty bladder independently and is up ambulating.  Patient performing self cares and is able to care for infant. Breastfeeding on demand. EDS=2.   Action: Patient medicated during the shift for pain with Tylenol, Ibuprofen and Oxycodone with relief after 1 hour. Lido patch in place. Patient education done see education record.  Response: Positive attachment behaviors observed with infant. Support persons  present.    Plan: Continue with the plan of care

## 2021-08-27 NOTE — LACTATION NOTE
This note was copied from a baby's chart.  Consult for: First time breastfeeding, assist with latching.    History:   delivery for fetal status @ 39w5d, AGA infant @ 6# 13 oz. birthweight,3.9 % loss at 24 hours with low risk serum bilirubin.  Maternal history of AMA @ 35 y/o, iron deficiency anemia (resolved Hgb 13.3 before delivery), mild asthma no recent inhaler use. Mom shares breastfeeding is sometimes comfortable especially during the night, but often painful with more shallow latch, request tips for getting him to open his mouth better.     Meagan had 35 mg oxycodone in 24 hours . Pain improved with moving gas last night & she feels if she takes all meds on schedule she won't need more than 30mg today (maximum recommended for lactation per [facility] and [employer]). With permission from OB provider, shared recommended limits for breastfeeding with patient. Encouraged continue to take pain meds as she needs them, and if needing more than 30 mg/24 hours to check in with infant's provider to make short term plan until oxycodone needs decrease.     Breast exam of mom: Soft, symmetric with intact, everted nipples bilaterally. Meagan noted early tenderness & bilateral breast growth during pregnancy.     Oral exam of baby: Mildly recessed chin, normal arch and jaws, organized with good cupping and tongue extend just beyond lower gum line when sucking on finger, parents have seen him extend tongue out past his lips.    Feeding assessment:  Assist with latching in laid back position, demo first then mom to do on her own second side. Khai latched readily but painful, show mom how to flip lower lip and tuck in more on top which resolved the discomfort. Excellent milk transfer with deep jaw pulls and frequent swallows, once every 2-3 sucks. Meagan latched independently with verbal coaching only second side, able to get comfortable latch with intermittent nutritive suck and swallows.    Education provided:  Discussed positioning with good support, anatomy of breast and infant mouth, tips to get and maintain deeper latch, breast compressions prn to enhance milk transfer, nutritive vs. non-nutritive suck and how to hear swallows, benefits of skin to skin and feeding on cue, supply and demand, benefits of frequent breast massage & hand expression in early days. Reviewed normal night time wakefulness, five S's for infant soothing, how to tell when satiated and if getting enough, what to expect in the coming days and preventing engorgement, breastfeeding log with when and who to call if concerns, [facility] pump cleaning handout and lactation resources for after discharge.    Feeding Plan: Please encourage frequent skin to skin, breastfeed on cue 8 to 12 times per day, hand express after feedings until milk is in & feed back results. Follow up with outpatient lactation consultant within a week of discharge, check in on comfort and support with first time breastfeeding. Meagan shares they have LC [facility] [place].

## 2021-08-27 NOTE — PLAN OF CARE
Patient verbalized that incisional pain is worst when getting up to the bathroom with a pain scale of 8. She continue to take pain meds when its available.and she said it helps. Passing flatus but no stool yet. Breastfeeding is getting better and breastfeeding challenges/ questions were answered by BIRDIE Vela today. Plan of discharging in am. Will continue with plan of care.

## 2021-08-28 VITALS
BODY MASS INDEX: 13.04 KG/M2 | DIASTOLIC BLOOD PRESSURE: 95 MMHG | OXYGEN SATURATION: 98 % | RESPIRATION RATE: 18 BRPM | SYSTOLIC BLOOD PRESSURE: 138 MMHG | WEIGHT: 74.19 LBS | TEMPERATURE: 97.8 F | HEART RATE: 73 BPM

## 2021-08-28 PROCEDURE — 250N000013 HC RX MED GY IP 250 OP 250 PS 637: Performed by: STUDENT IN AN ORGANIZED HEALTH CARE EDUCATION/TRAINING PROGRAM

## 2021-08-28 RX ORDER — OXYCODONE HYDROCHLORIDE 5 MG/1
5-10 TABLET ORAL EVERY 6 HOURS PRN
Qty: 5 TABLET | Refills: 0 | Status: SHIPPED | OUTPATIENT
Start: 2021-08-28 | End: 2021-09-11

## 2021-08-28 RX ADMIN — OXYCODONE HYDROCHLORIDE 5 MG: 5 TABLET ORAL at 04:07

## 2021-08-28 RX ADMIN — DOCUSATE SODIUM AND SENNOSIDES 2 TABLET: 8.6; 5 TABLET ORAL at 08:14

## 2021-08-28 RX ADMIN — OXYCODONE HYDROCHLORIDE 5 MG: 5 TABLET ORAL at 13:53

## 2021-08-28 RX ADMIN — OXYCODONE HYDROCHLORIDE 5 MG: 5 TABLET ORAL at 00:17

## 2021-08-28 RX ADMIN — SIMETHICONE 80 MG: 80 TABLET, CHEWABLE ORAL at 08:15

## 2021-08-28 RX ADMIN — IBUPROFEN 800 MG: 800 TABLET, FILM COATED ORAL at 09:08

## 2021-08-28 RX ADMIN — BISACODYL 10 MG: 10 SUPPOSITORY RECTAL at 11:48

## 2021-08-28 RX ADMIN — ACETAMINOPHEN 975 MG: 325 TABLET, FILM COATED ORAL at 09:08

## 2021-08-28 RX ADMIN — ACETAMINOPHEN 975 MG: 325 TABLET, FILM COATED ORAL at 02:52

## 2021-08-28 RX ADMIN — IBUPROFEN 800 MG: 800 TABLET, FILM COATED ORAL at 02:53

## 2021-08-28 RX ADMIN — OXYCODONE HYDROCHLORIDE 5 MG: 5 TABLET ORAL at 08:15

## 2021-08-28 NOTE — PLAN OF CARE
Data: Vital signs and postpartum checks WDL  Patient eating and drinking normally. Patient able to empty bladder independently and is up ambulating.  Patient performing self cares and is able to care for infant. Breastfeeding on demand.  Action: Patient medicated during the shift for pain with Tylenol, Oxycodone and Ibuprofen with relief after 1 hour. Lido patch in place. Patient education done see education record.  Response: Positive attachment behaviors observed with infant. Support persons  present.    Plan: Continue with the plan of care. Discharging today.

## 2021-08-28 NOTE — PLAN OF CARE
VSS and postpartum assessments WDL.  Up ad bentley with steady gait and independent with cares.  Bonding well with infant.  Breastfeeding on cue independently with good latch, also started hand expressing and cup fed to infant after breastfeeding.  Pain managed with tylenol, ibuprofen, simethicone and oxycodone per MAR.  Incisional steristrips intact with scant drainage, Dr Hollis assessed today.  , Young present and supportive.  Reviewed discharge medications.  Reviewed follow-up appointments in 1 week for BP and incisional checks and in 6 weeks for postpartum check.  Reviewed discharge instructions and answered all questions.  Discharged home with infant and all belongings at 1520.

## 2021-08-28 NOTE — PROGRESS NOTES
Post Partum Progress Note  PPD#3    Subjective:  She is resting comfortably in bed this morning. Pain controlled. She is tolerating PO intake. Lochia minimal.  She is voiding without difficulty. She has passed flatus and has had a BM. She is ambulating without dizziness or difficulty.  She denies headache, changes in vision, nausea/vomiting, chest pain, shortness of breath, RUQ pain, or worsening edema. Breastfeeding is going well. Desires OCPs for birth control.    Objective:  Patient Vitals for the past 24 hrs:   BP Temp Temp src Pulse Resp Weight   21 0500 -- -- -- -- -- 33.7 kg (74 lb 3 oz)   21 0253 124/85 98.7  F (37.1  C) Oral 74 17 --   21 1503 (!) 141/86 -- -- 81 16 --     General: NAD. A&Ox3.  CV: Regular rate, well perfused.   Pulm: Normal respiratory effort.  Abd: Soft, non-tender, non-distended. Fundus is firm and below the umbilicus.    Incision: c/d/i,  Ext: trace lower extremity edema bilaterally. No calf tenderness.    Assessment/Plan:  Meagan Monroy is a 36 year old  female who is POD#3 s/p PLTCS for category II FHT, currently recovering well from surgery    Routine postpartum care  - Encourage routine post-operative goals including ambulation and incentive spirometry  - PNC: Rh positive. Rubella immune. No intervention indicated.  - Pain: controlled on oral medications  - Heme: Hgb 13.3>>11  - GI: continue anti-emetics and stool softeners as needed.  - : Voiding spontaneously.  - Infant: Stable in room  - Feeding: Plans on breastfeeding.  - BC: OCPs, would like to get from primary OB provider    GHTN  - HELLP labs wnl   - BP normal to low mild range  - no s/sx preeclampsia at this time    Discharge to home today.    George Pena MD MPH  OB/Gyn PGY-3  21 9:01 AM    Attestation:   This patient was seen and evaluated by me, separately from the house staff team. I have reviewed the note/plan above and agree.     Doing well and ready to go home today.  Incision with small leaking on right side but dry now, discussed incision care and discharge instructions, answered all questions. Has used a few oxycodone so #5 tablets sent to pharmacy. Preeclampsia signs and sx discussed--will call if any develop. Plan RTC 6 weeks for pp visit.    Loni Hollis MD

## 2021-08-28 NOTE — DISCHARGE INSTRUCTIONS
Postop  Birth Instructions    Follow-up:  Next week for blood pressure check and incision check.  Clinic will call you on Monday to get this scheduled.    Activity       Do not lift more than 10 pounds for 6 weeks after surgery.  Ask family and friends for help when you need it.    No driving until you have stopped taking your pain medications (usually two weeks after surgery).    No heavy exercise or activity for 6 weeks.  Don't do anything that will put a strain on your surgery site.    Don't strain when using the toilet.  Your care team may prescribe a stool softener if you have problems with your bowel movements.     To care for your incision:       Keep the incision clean and dry.    Do not soak your incision in water. No swimming or hot tubs until it has fully healed. You may soak in the bathtub if the water level is below your incision.    Do not use peroxide, gel, cream, lotion, or ointment on your incision.    Adjust your clothes to avoid pressure on your surgery site (check the elastic in your underwear for example).     You may see a small amount of clear or pink drainage and this is normal.  Check with your health care provider:       If the drainage increases or has an odor.    If the incision reddens, you have swelling, or develop a rash.    If you have increased pain and the medicine we prescribed doesn't help.    If you have a fever above 100.4 F (38 C) with or without chills when placing thermometer under your tongue.   The area around your incision (surgery wound), will feel numb.  This is normal. The numbness should go away in less than a year.     Keep your hands clean:  Always wash your hands before touching your incision (surgery wound). This helps reduce your risk of infection. If your hands aren't dirty, you may use an alcohol hand-rub to clean your hands. Keep your nails clean and short.    Call your healthcare provider if you have any of these symptoms:       You soak a sanitary pad  with blood within 1 hour, or you see blood clots larger than a golf ball.    Bleeding that lasts more than 6 weeks.    Vaginal discharge that smells bad.    Severe pain, cramping or tenderness in your lower belly area.    A need to urinate more frequently (use the toilet more often), more urgently (use the toilet very quickly), or it burns when you urinate.    Nausea and vomiting.    Redness, swelling or pain around a vein in your leg.    Problems breastfeeding or a red or painful area on your breast.    Chest pain and cough or are gasping for air.    Problems with coping with sadness, anxiety or depression. If you have concerns about hurting yourself or the baby, call your provider immediately.      You have questions or concerns after you return home.     Preeclampsia   Call your doctor right away if you have any of the following:  - Edema (swelling) in your face or hands  - Rapid weight gain-about 1 pound or more in a day  - Headache  - Abdominal pain on your right side  - Vision problems (flashes or spots)  - You have questions or concerns once you return home.

## 2021-08-30 ENCOUNTER — TELEPHONE (OUTPATIENT)
Dept: OBGYN | Facility: CLINIC | Age: 36
End: 2021-08-30

## 2021-08-30 NOTE — TELEPHONE ENCOUNTER
----- Message from Loni Hollis MD sent at 8/28/2021 11:01 AM CDT -----  Regarding: PPD f/u  Patient has been discharged and needs the following appointments scheduled:    In person 1 Week BP Check and In person 6 Week Routine Post-partum Visit    Appointments should be scheduled with Any Available MD      Please call patient to schedule. Thank you!

## 2021-08-31 ENCOUNTER — TELEPHONE (OUTPATIENT)
Dept: OBGYN | Facility: CLINIC | Age: 36
End: 2021-08-31

## 2021-08-31 NOTE — TELEPHONE ENCOUNTER
Forms received, signed by AO. Filled out and faxed back to number listed at top of form.     [facility]  Surgery Scheduler

## 2021-09-02 ENCOUNTER — OFFICE VISIT (OUTPATIENT)
Dept: OBGYN | Facility: CLINIC | Age: 36
End: 2021-09-02
Payer: COMMERCIAL

## 2021-09-02 VITALS
HEART RATE: 80 BPM | SYSTOLIC BLOOD PRESSURE: 134 MMHG | OXYGEN SATURATION: 99 % | BODY MASS INDEX: 27.06 KG/M2 | WEIGHT: 154 LBS | DIASTOLIC BLOOD PRESSURE: 96 MMHG

## 2021-09-02 DIAGNOSIS — I15.9 SECONDARY HYPERTENSION: Primary | ICD-10-CM

## 2021-09-02 LAB
ALT SERPL W P-5'-P-CCNC: 19 U/L (ref 0–50)
AST SERPL W P-5'-P-CCNC: 17 U/L (ref 0–45)
CREAT UR-MCNC: 14 MG/DL
ERYTHROCYTE [DISTWIDTH] IN BLOOD BY AUTOMATED COUNT: 12.4 % (ref 10–15)
HCT VFR BLD AUTO: 37.4 % (ref 35–47)
HGB BLD-MCNC: 12.3 G/DL (ref 11.7–15.7)
MCH RBC QN AUTO: 29.5 PG (ref 26.5–33)
MCHC RBC AUTO-ENTMCNC: 32.9 G/DL (ref 31.5–36.5)
MCV RBC AUTO: 90 FL (ref 78–100)
PLATELET # BLD AUTO: 527 10E3/UL (ref 150–450)
PROT UR-MCNC: <0.05 G/L
PROT/CREAT 24H UR: NORMAL MG/G{CREAT}
RBC # BLD AUTO: 4.17 10E6/UL (ref 3.8–5.2)
WBC # BLD AUTO: 8.9 10E3/UL (ref 4–11)

## 2021-09-02 PROCEDURE — 85027 COMPLETE CBC AUTOMATED: CPT | Performed by: OBSTETRICS & GYNECOLOGY

## 2021-09-02 PROCEDURE — 84460 ALANINE AMINO (ALT) (SGPT): CPT | Performed by: OBSTETRICS & GYNECOLOGY

## 2021-09-02 PROCEDURE — 99213 OFFICE O/P EST LOW 20 MIN: CPT | Mod: 24 | Performed by: OBSTETRICS & GYNECOLOGY

## 2021-09-02 PROCEDURE — 84156 ASSAY OF PROTEIN URINE: CPT | Performed by: OBSTETRICS & GYNECOLOGY

## 2021-09-02 PROCEDURE — 84450 TRANSFERASE (AST) (SGOT): CPT | Performed by: OBSTETRICS & GYNECOLOGY

## 2021-09-02 PROCEDURE — 36415 COLL VENOUS BLD VENIPUNCTURE: CPT | Performed by: OBSTETRICS & GYNECOLOGY

## 2021-09-02 NOTE — PROGRESS NOTES
Delivered by C/S 8/25/2, blood pressure was borderline elevated in the hospital but antihypertensive meds not needed, went home on day 3.  Comes in today for f/u BP and assessment.  Had one headache, abdomen is sore especially left lower area lateral to incision.  Using a binder for comfort.  Incision is healing well.  Initial /95, repeat after sitting 131/96.  Discussed, will send pre-eclampsia labs now, call for results this afternoon.  If reassuring, she will monitor symptoms and call prn, RTC in one week.  AM    (Addendum:  Labs WNL.)

## 2021-09-04 ENCOUNTER — HEALTH MAINTENANCE LETTER (OUTPATIENT)
Age: 36
End: 2021-09-04

## 2021-09-10 ENCOUNTER — OFFICE VISIT (OUTPATIENT)
Dept: OBGYN | Facility: CLINIC | Age: 36
End: 2021-09-10
Payer: COMMERCIAL

## 2021-09-10 VITALS
DIASTOLIC BLOOD PRESSURE: 92 MMHG | SYSTOLIC BLOOD PRESSURE: 148 MMHG | WEIGHT: 153.8 LBS | BODY MASS INDEX: 26.26 KG/M2 | HEIGHT: 64 IN | HEART RATE: 82 BPM

## 2021-09-10 DIAGNOSIS — O13.3 GESTATIONAL HYPERTENSION W/O SIGNIFICANT PROTEINURIA IN 3RD TRIMESTER: Primary | ICD-10-CM

## 2021-09-10 PROCEDURE — 99213 OFFICE O/P EST LOW 20 MIN: CPT | Mod: 24 | Performed by: OBSTETRICS & GYNECOLOGY

## 2021-09-10 RX ORDER — SIMETHICONE 125 MG
125 TABLET,CHEWABLE ORAL 2 TIMES DAILY
COMMUNITY
End: 2021-10-07

## 2021-09-10 ASSESSMENT — ANXIETY QUESTIONNAIRES
6. BECOMING EASILY ANNOYED OR IRRITABLE: NOT AT ALL
1. FEELING NERVOUS, ANXIOUS, OR ON EDGE: SEVERAL DAYS
IF YOU CHECKED OFF ANY PROBLEMS ON THIS QUESTIONNAIRE, HOW DIFFICULT HAVE THESE PROBLEMS MADE IT FOR YOU TO DO YOUR WORK, TAKE CARE OF THINGS AT HOME, OR GET ALONG WITH OTHER PEOPLE: NOT DIFFICULT AT ALL
7. FEELING AFRAID AS IF SOMETHING AWFUL MIGHT HAPPEN: NOT AT ALL
3. WORRYING TOO MUCH ABOUT DIFFERENT THINGS: NOT AT ALL
GAD7 TOTAL SCORE: 1
2. NOT BEING ABLE TO STOP OR CONTROL WORRYING: NOT AT ALL
5. BEING SO RESTLESS THAT IT IS HARD TO SIT STILL: NOT AT ALL

## 2021-09-10 ASSESSMENT — PATIENT HEALTH QUESTIONNAIRE - PHQ9
SUM OF ALL RESPONSES TO PHQ QUESTIONS 1-9: 1
5. POOR APPETITE OR OVEREATING: NOT AT ALL

## 2021-09-10 ASSESSMENT — MIFFLIN-ST. JEOR: SCORE: 1362.63

## 2021-09-11 ASSESSMENT — ANXIETY QUESTIONNAIRES: GAD7 TOTAL SCORE: 1

## 2021-09-12 NOTE — PROGRESS NOTES
"    Assessment & Plan     Gestational hypertension w/o significant proteinuria in 3rd trimester  Discussed that blood pressures have remained in the mild range and do not need treatment with medication at this time.  Discussed warning signs but reviewed that these warning signs will be readily apparent if they occur.  Advised against scanning repetitively for symptoms if she can help it.  Recommend weekly blood pressure check until elevated blood pressure resolves.  She feels this would be reassuring.  We will do next visit in person and and bring blood pressure cuff to clinic with her.  We can compare it to our clinic cuff and know that she is using it correctly.  Future visits may then be able to be virtual.  Feels reassured by conversation and does not feel like she is struggling with postpartum mood disorder at this time.          25 minutes spent on the date of the encounter doing chart review, history and exam, documentation and further activities per the note       BMI:   Estimated body mass index is 26.4 kg/m  as calculated from the following:    Height as of this encounter: 1.626 m (5' 4\").    Weight as of this encounter: 69.8 kg (153 lb 12.8 oz).   Weight management plan: Discussed healthy diet and exercise guidelines        No follow-ups on file.    Morelia Lira MD  [facility]    Trisha Rhodes is a 36 year old who presents for the following health issues     HPI   Presents for follow-up of gestational hypertension.  Had blood pressure check about a week ago.  Delivered on  by  for category 2 fetal heart rate tracing.  Doing well but finding herself worrying a lot about her blood pressure.  A friend brought over a blood pressure cuff but she has been too scared to use it.  Finds herself worrying excessively about blood pressure, thinking she may need medication, thinking she may have further complications, thinking hypertension may not improve.  " Has been researching lifestyle changes to make to improve her blood pressure.  Here with her  and new baby   Her  is off work and home with her currently which is helpful.       JANA-7 SCORE 2021 2021 9/10/2021   Total Score 2 1 1       PHQ 2021 2021 9/10/2021   PHQ-9 Total Score 0 2 1   Q9: Thoughts of better off dead/self-harm past 2 weeks Not at all Not at all Not at all         Past Medical History:   Diagnosis Date     Mild asthma        Past Surgical History:   Procedure Laterality Date      SECTION N/A 2021    Procedure:  SECTION;  Surgeon: Julissa Garcia MD;  Location: [facility] L+[facility]     wisdom teeth         Family History   Problem Relation Age of Onset     Asthma Mother      Tongue cancer Father      Hypertension Father      Diabetes Paternal Grandmother      Heart Failure Paternal Grandmother      Childhood Heart Disease Brother        Social History     Socioeconomic History     Marital status:      Spouse name: Not on file     Number of children: Not on file     Years of education: Not on file     Highest education level: Not on file   Occupational History     Not on file   Tobacco Use     Smoking status: Never Smoker     Smokeless tobacco: Never Used   Substance and Sexual Activity     Alcohol use: Not Currently     Drug use: Never     Sexual activity: Yes     Partners: Male   Other Topics Concern     Not on file   Social History Narrative     Not on file     Social Determinants of Health     Financial Resource Strain:      Difficulty of Paying Living Expenses:    Food Insecurity:      Worried About Running Out of Food in the Last Year:      Ran Out of Food in the Last Year:    Transportation Needs:      Lack of Transportation (Medical):      Lack of Transportation (Non-Medical):    Physical Activity:      Days of Exercise per Week:      Minutes of Exercise per Session:    Stress:      Feeling of Stress :    Social Connections:      Frequency of  "Communication with Friends and Family:      Frequency of Social Gatherings with Friends and Family:      Attends Zoroastrianism Services:      Active Member of Clubs or Organizations:      Attends Club or Organization Meetings:      Marital Status:    Intimate Partner Violence:      Fear of Current or Ex-Partner:      Emotionally Abused:      Physically Abused:      Sexually Abused:        Current Outpatient Medications   Medication     acetaminophen (TYLENOL) 325 MG tablet     Cetirizine HCl (ZYRTEC PO)     ibuprofen (ADVIL/MOTRIN) 200 MG tablet     Prenatal Vit-Fe Fumarate-FA (PRENATAL MULTIVITAMIN W/IRON) 27-0.8 MG tablet     senna-docusate (SENOKOT-S/PERICOLACE) 8.6-50 MG tablet     simethicone (MYLICON) 125 MG chewable tablet     No current facility-administered medications for this visit.        No Known Allergies  # 1 - Date: 21, Sex: Male, Weight: 3.09 kg (6 lb 13 oz), GA: 39w5d, Delivery: , Low Transverse, Apgar1: 7, Apgar5: 7, Living: Living, Birth Comments: None      Review of Systems   Constitutional, HEENT, cardiovascular, pulmonary, gi and gu systems are negative, except as otherwise noted.      Objective    BP (!) 148/92 (BP Location: Right arm, Patient Position: Sitting, Cuff Size: Adult Regular)   Pulse 82   Ht 1.626 m (5' 4\")   Wt 69.8 kg (153 lb 12.8 oz)   LMP 2020 (Exact Date)   BMI 26.40 kg/m    Body mass index is 26.4 kg/m .  Physical Exam   GENERAL: healthy, alert and no distress  ABDOMEN: soft, nontender, no hepatosplenomegaly, no masses and bowel sounds normal  MS: no gross musculoskeletal defects noted, no edema  SKIN: no suspicious lesions or rashes. Steristrips removed by me.   PSYCH: mentation appears normal, affect normal/bright; tearful when discussing hypertension                "

## 2021-09-17 ENCOUNTER — OFFICE VISIT (OUTPATIENT)
Dept: OBGYN | Facility: CLINIC | Age: 36
End: 2021-09-17
Payer: COMMERCIAL

## 2021-09-17 VITALS
OXYGEN SATURATION: 97 % | SYSTOLIC BLOOD PRESSURE: 120 MMHG | HEIGHT: 64 IN | HEART RATE: 93 BPM | TEMPERATURE: 97.7 F | BODY MASS INDEX: 25.95 KG/M2 | WEIGHT: 152 LBS | DIASTOLIC BLOOD PRESSURE: 84 MMHG

## 2021-09-17 DIAGNOSIS — O13.9 GESTATIONAL HYPERTENSION AFFECTING FIRST PREGNANCY: ICD-10-CM

## 2021-09-17 PROCEDURE — 99212 OFFICE O/P EST SF 10 MIN: CPT | Mod: 24 | Performed by: OBSTETRICS & GYNECOLOGY

## 2021-09-17 ASSESSMENT — MIFFLIN-ST. JEOR: SCORE: 1354.47

## 2021-09-18 NOTE — PROGRESS NOTES
Assessment & Plan     Gestational hypertension affecting first pregnancy  Resolved - BPs normal.   F/u for 6 week BP check.                    No follow-ups on file.    Morelia Lira MD  [facility] MARY Rhodes is a 36 year old who presents for the following health issues     HPI   F/u gestational hypertension  Brought home BP cuff with today: 140/100 in clinic.     Past Medical History:   Diagnosis Date     Mild asthma        Past Surgical History:   Procedure Laterality Date      SECTION N/A 2021    Procedure:  SECTION;  Surgeon: Julissa Garcia MD;  Location:  L+D     wisdom teeth         Family History   Problem Relation Age of Onset     Asthma Mother      Tongue cancer Father      Hypertension Father      Childhood Heart Disease Brother      Diabetes Paternal Grandmother      Heart Failure Paternal Grandmother        Social History     Socioeconomic History     Marital status:      Spouse name: Not on file     Number of children: Not on file     Years of education: Not on file     Highest education level: Not on file   Occupational History     Not on file   Tobacco Use     Smoking status: Never Smoker     Smokeless tobacco: Never Used   Substance and Sexual Activity     Alcohol use: Not Currently     Drug use: Never     Sexual activity: Yes     Partners: Male   Other Topics Concern     Not on file   Social History Narrative     Not on file     Social Determinants of Health     Financial Resource Strain:      Difficulty of Paying Living Expenses:    Food Insecurity:      Worried About Running Out of Food in the Last Year:      Ran Out of Food in the Last Year:    Transportation Needs:      Lack of Transportation (Medical):      Lack of Transportation (Non-Medical):    Physical Activity:      Days of Exercise per Week:      Minutes of Exercise per Session:    Stress:      Feeling of Stress :    Social Connections:      Frequency of  "Communication with Friends and Family:      Frequency of Social Gatherings with Friends and Family:      Attends Quaker Services:      Active Member of Clubs or Organizations:      Attends Club or Organization Meetings:      Marital Status:    Intimate Partner Violence:      Fear of Current or Ex-Partner:      Emotionally Abused:      Physically Abused:      Sexually Abused:        Current Outpatient Medications   Medication     acetaminophen (TYLENOL) 325 MG tablet     Cetirizine HCl (ZYRTEC PO)     ibuprofen (ADVIL/MOTRIN) 200 MG tablet     Prenatal Vit-Fe Fumarate-FA (PRENATAL MULTIVITAMIN W/IRON) 27-0.8 MG tablet     senna-docusate (SENOKOT-S/PERICOLACE) 8.6-50 MG tablet     simethicone (MYLICON) 125 MG chewable tablet     No current facility-administered medications for this visit.        No Known Allergies      Review of Systems   Constitutional, HEENT, cardiovascular, pulmonary, gi and gu systems are negative, except as otherwise noted.      Objective    /84   Pulse 93   Temp 97.7  F (36.5  C) (Oral)   Ht 1.626 m (5' 4\")   Wt 68.9 kg (152 lb)   LMP 11/20/2020 (Exact Date)   SpO2 97%   Breastfeeding Yes   BMI 26.09 kg/m    Body mass index is 26.09 kg/m .  Physical Exam   GENERAL: healthy, alert and no distress  MS: no gross musculoskeletal defects noted, no edema  PSYCH: mentation appears normal, affect normal/bright                "

## 2021-10-07 ENCOUNTER — PRENATAL OFFICE VISIT (OUTPATIENT)
Dept: OBGYN | Facility: CLINIC | Age: 36
End: 2021-10-07
Payer: COMMERCIAL

## 2021-10-07 VITALS
DIASTOLIC BLOOD PRESSURE: 80 MMHG | TEMPERATURE: 97.8 F | HEIGHT: 64 IN | HEART RATE: 62 BPM | SYSTOLIC BLOOD PRESSURE: 129 MMHG | WEIGHT: 151 LBS | BODY MASS INDEX: 25.78 KG/M2

## 2021-10-07 DIAGNOSIS — Z30.41 ORAL CONTRACEPTIVE PILL SURVEILLANCE: ICD-10-CM

## 2021-10-07 DIAGNOSIS — Z23 NEED FOR PROPHYLACTIC VACCINATION AND INOCULATION AGAINST INFLUENZA: ICD-10-CM

## 2021-10-07 DIAGNOSIS — N63.0 LUMP OR MASS IN BREAST: ICD-10-CM

## 2021-10-07 PROCEDURE — 90471 IMMUNIZATION ADMIN: CPT | Performed by: OBSTETRICS & GYNECOLOGY

## 2021-10-07 PROCEDURE — 99207 PR POST PARTUM EXAM: CPT | Performed by: OBSTETRICS & GYNECOLOGY

## 2021-10-07 PROCEDURE — 99213 OFFICE O/P EST LOW 20 MIN: CPT | Mod: 25 | Performed by: OBSTETRICS & GYNECOLOGY

## 2021-10-07 PROCEDURE — 90686 IIV4 VACC NO PRSV 0.5 ML IM: CPT | Performed by: OBSTETRICS & GYNECOLOGY

## 2021-10-07 RX ORDER — ACETAMINOPHEN AND CODEINE PHOSPHATE 120; 12 MG/5ML; MG/5ML
0.35 SOLUTION ORAL DAILY
Qty: 84 TABLET | Refills: 3 | Status: SHIPPED | OUTPATIENT
Start: 2021-10-07 | End: 2022-05-12

## 2021-10-07 ASSESSMENT — MIFFLIN-ST. JEOR: SCORE: 1349.93

## 2021-10-07 NOTE — PROGRESS NOTES
"Meagan is here for a 6-week postpartum checkup.    She had a c/s for abnormal FHT of a viable boy, weight 6 pounds 13 oz., with PIH complications.  Since delivery, she has been breast feeding.  She has no signs of infection, bleeding or other complications.  She is not pregnant.  We discussed contraception and she has chosen mini pill / progesterone only pill.    Mood is good.  Today's Depression Rating was   PHQ-9 SCORE 9/10/2021   PHQ-9 Total Score 1     Noticed non painful lump in left breast. Always had more supply from right side than left. Noticed lump 4 days ago. Massage, heat pack, hand expression, pumping; lump is still there.    EXAM:  Vitals:    10/07/21 0927   BP: 129/80   Pulse: 62   Temp: 97.8  F (36.6  C)   TempSrc: Oral   Weight: 68.5 kg (151 lb)   Height: 1.626 m (5' 4\")     HEENT: grossly normal.  NECK: no lymphadenopathy or thyroidomegaly.  LUNGS: CTA X 2, no rales or crackles.  BREASTS: 1.5 cm round mass at 1:00 in left breast, mobile, smooth.   BACK: No spinal or CVA tenderness.  HEART: RRR without murmurs clicks or gallops.  ABDOMEN: soft, non tender, good bowel sounds, without masses rebound, guarding or tenderness.  Pfannenstiel incision well healed    PELVIC:    External genitalia: normal without lesion.                            Vagina: normal mucosa and rugae, no discharge.  Cervix: multiparous, well healed, without lesion.  Uterus: non pregnant in size, firm , mobile, no lesions.  Adnexa: non tender, without masses    EXTREMITIES: Warm to touch, good pulses, no ankle edema or calf tenderness.  NEUROLOGIC: grossly normal.    ASSESSMENT:   Normal 6-week postpartum exam after c/s for abnormal FHT.    PLAN:  Pap smear Due 7/24 and mini pill / progesterone only pill for contraception.  (Z39.2) Routine postpartum follow-up  (primary encounter diagnosis)  Comment:   Plan: F/u in 1 year or prn    (Z23) Need for prophylactic vaccination and inoculation against influenza  Comment:   Plan: " INFLUENZA VACCINE IM > 6 MONTHS VALENT IIV4         (AFLURIA/FLUZONE), INJECTION INTRAMUSCULAR OR         SUB-Q            (Z30.41) Oral contraceptive pill surveillance  Comment:   Plan: norethindrone (MICRONOR) 0.35 MG tablet            (N63.0) Lump or mass in breast  Comment:   Plan: MA Diagnostic Digital Bilateral, US Breast Left        Complete 4 Quadrants

## 2021-10-13 ENCOUNTER — HOSPITAL ENCOUNTER (OUTPATIENT)
Dept: MAMMOGRAPHY | Facility: CLINIC | Age: 36
End: 2021-10-13
Attending: OBSTETRICS & GYNECOLOGY
Payer: COMMERCIAL

## 2021-10-13 ENCOUNTER — HOSPITAL ENCOUNTER (OUTPATIENT)
Dept: ULTRASOUND IMAGING | Facility: CLINIC | Age: 36
End: 2021-10-13
Attending: OBSTETRICS & GYNECOLOGY
Payer: COMMERCIAL

## 2021-10-13 DIAGNOSIS — N63.0 LUMP OR MASS IN BREAST: ICD-10-CM

## 2021-10-13 PROCEDURE — 76642 ULTRASOUND BREAST LIMITED: CPT | Mod: LT

## 2021-10-13 PROCEDURE — 77062 BREAST TOMOSYNTHESIS BI: CPT

## 2021-10-19 NOTE — PROGRESS NOTES
"Assessment:   1. Eight week old infant gaining weight well on breastfeeding  2.  Fair latch, but good suck and milk transfer in office today  3.  Mother with ample milk supply and rapid letdown, leading to some loss of suction by baby when feeding    Plan:   1.  Use good positioning for deep latch, with baby held close to body and baby's head/shoulders/hips in good alignment.  When in a seated position, use a pillow to help bring baby close to breasts, and stepstool to elevate your knees above hips.   2.  For the fast milk letdown that makes it difficult for Khai to stay latched to the breast, try using the laid-back nursing position.  You can also use one hand to gently block some milk ducts during letdown and help baby more easily cope with flow.  You can also try taking baby off of breast when the strong milk letdown starts, and allow milk to flow out into burp cloth, re-latching baby after flow has slowed.  3.  To continue to nurse baby on cue, 8-12 times each day.  Feed on one side until baby finishes swallowing.  Once swallowing slows, use breast compression to encourage more swallowing, but once there is no more active swallowing, and baby is either sleeping, coming off the breast, or just \"nibbling,\" it is OK to use a finger to take baby off the breast and move to the other breast.  Do the same on the other side.  Offer both breasts at each feeding.  It is more important to watch the baby than the clock!   4.  If Khai doesn't take that second side, you don't need to pump that side unless you are really uncomfortable, in which case you could pump just a little, enough to relieve the pressure.   5.  Consider trying the laid-back or sidelying positions as other comfortable options.  6.  You can also try using a little gentle pressure on Khai' chin to help him open more widely when nursing;  This may help with his loss of suction during your rapid milk flow.  7.  See pediatric provider as planned, and " lactation as needed.       Subjective: Meagan is here today primarily because she has concerns about fast letdown:  Khai does frequent swallowing/gulping early in feeding, and then will pull off and cry.  Also notices that Khai will slip down to a shallower latch through the course of the feeding.  Baby does most vigorous nursing on the first side, and then sometimes does not empty second side as completely.  Has had some plugged ducts on that side.  Uses silverette nipple covers for general comfort;  Does not have any nipple pain. Baby does spit up more frequently lately. Questions about positioning and posture while nursing, as well.     Also of note, Breast imaging was done for a left breast lump, which found galactocele. This is not painful.    Relevant History:  Previous Breastfeeding Experience: first baby    Breast surgery: none    Hospital Course: Elective induction with ripening and Pitocin;   during second stage for fetal distress. Gestational HTN.  Seen by hospital IBCLC for painful latch; given routine assistance.    Infant's name: Khai Mcgowan   Infant's bday: [birth year]  Gestational age: 39w5d  Infant's birth weight: 6 # 13 oz        Mode of delivery:   Infant's MD: [facility], Natali Wynn, LAZARO.  Meagan gives her permission for today's note to be forwarded to Ms. Caraballo.  JF signed and filed in Meagan's chart as Khai has no local active pediatric chart.    Discharge weight: 6 # 2.8 oz    Frequency and duration of feedings: about every 3 hours, feeding about 15-30 min  Swallows audible per mother: yes  Numbers of feedings in 24 hours: 8  Number urines per day: 8-10  Number of stools per day and their color: 4-6, loose yellow    Supplementation: offering a bottle about every other day for practice, taking 3 1/2 - 4 oz  Pumping: about every other day, yielding about 5-7 oz in about 7 minutes    Objective/Physical exam:   Mother: Noticed breasts grew larger and  areolas darkened during pregnancy and she noticed primary engorgement when her milk came in on day 4  Her nipples are everted, the areola is compressible, the breast is soft and full.     Sore nipples: none  EPDS:     Assessment of infant: 20.60% Weight for age percentile   Age today: 8 weeks  Today's weight: 10 # 11.4 oz  Amount of milk transferred from LEFT side: 1.6 oz  Amount of milk transferred from RIGHT side: not weighed, but baby swallowing well    Baby has full flexion of arms and legs, normal tone, behavior is alert and active, respirations are normal, skin is normal, hydration is normal, jaw is normal size and alignment, palate is normal, frenulum is normal, baby can lateralize tongue, has adequate tongue lift, and tongue can protrude past bottom gum line.    Suck exam:  Baby has strong, coordinated suck with good  tongue cupping    Baby thrush: none   Jaundice: none      Feeding assessment: Baby can hold suction with tongue while at the breast, but partway through first side baby became very agitated and fussy, and was very difficult to calm.  Did not nurse well in the sidelying or laid-back positions, and needed to be calmed with pacifier and returning to seated position before was able to nurse again.    Alignment: The baby was flex relaxed. Baby's head was aligned with its trunk. Baby did face mother. Baby was in cross cradle, laid back and sidelying positions today, although only nursed well in cross-cradle.    Areolar Grasp: Baby was able to open mouth wide. Baby's lips were not pursed. Baby's lips did flange outward. Tongue was visible over bottom gum. Baby had incomplete seal, cara with fast milk flow.    Areolar Compression: Baby made rhythmic motion. There were repeated clicking and smacking sounds during rapid milk flow. There was no severe nipple discomfort. Nipples appeared rounded after feeding.    Audible swallowing: Baby made quiet sounds of swallowing: There was an increase in frequency  after milk ejection reflex. The milk ejection reflex is normal and milk supply is ample.     BP (!) 130/92   Pulse 95   Wt 66.7 kg (147 lb)   BMI 25.23 kg/m    OB History    Para Term  AB Living   1 1 1 0 0 1   SAB TAB Ectopic Multiple Live Births   0 0 0 0 1      # Outcome Date GA Lbr Inder/2nd Weight Sex Delivery Anes PTL Lv   1 Term 21 39w5d  3.09 kg (6 lb 13 oz) M [id number]  N JOURDAN      Complications: Fetal Intolerance      Name: Khai      Apgar1: 7  Apgar5: 7      Obstetric Comments   Khai       Current Outpatient Medications:      acetaminophen (TYLENOL) 325 MG tablet, Take 2 tablets (650 mg) by mouth every 6 hours as needed for mild pain Start after Delivery., Disp: , Rfl:      Cetirizine HCl (ZYRTEC PO), , Disp: , Rfl:      ibuprofen (ADVIL/MOTRIN) 200 MG tablet, Take 3 tablets (600 mg) by mouth every 6 hours as needed for moderate pain Start after delivery, Disp: , Rfl:      norethindrone (MICRONOR) 0.35 MG tablet, Take 1 tablet (0.35 mg) by mouth daily, Disp: 84 tablet, Rfl: 3     Prenatal Vit-Fe Fumarate-FA (PRENATAL MULTIVITAMIN W/IRON) 27-0.8 MG tablet, Take 1 tablet by mouth daily, Disp: 90 tablet, Rfl: 3  Past Medical History:   Diagnosis Date     Mild asthma      Past Surgical History:   Procedure Laterality Date      SECTION N/A 2021    Procedure:  SECTION;  Surgeon: Julissa Garcia MD;  Location:  L+D     wisdom teeth       Family History   Problem Relation Age of Onset     Asthma Mother      Tongue cancer Father      Hypertension Father      Childhood Heart Disease Brother      Diabetes Paternal Grandmother      Heart Failure Paternal Grandmother        Time spent:  Chart review/precharting: 10 min prior to day of service  Face-to-face visit:   65 min   Documentation:  12 min   Total time spent on day of service: 77 min      Fidelina Millard, APRN, CNM, IBCLC

## 2021-10-20 ENCOUNTER — OFFICE VISIT (OUTPATIENT)
Dept: OBGYN | Facility: CLINIC | Age: 36
End: 2021-10-20
Payer: COMMERCIAL

## 2021-10-20 VITALS
SYSTOLIC BLOOD PRESSURE: 130 MMHG | WEIGHT: 147 LBS | HEART RATE: 95 BPM | BODY MASS INDEX: 25.23 KG/M2 | DIASTOLIC BLOOD PRESSURE: 92 MMHG

## 2021-10-20 DIAGNOSIS — O92.79 OTHER DISORDERS OF LACTATION: Primary | ICD-10-CM

## 2021-10-20 PROCEDURE — 99417 PROLNG OP E/M EACH 15 MIN: CPT | Performed by: ADVANCED PRACTICE MIDWIFE

## 2021-10-20 PROCEDURE — 99215 OFFICE O/P EST HI 40 MIN: CPT | Mod: 24 | Performed by: ADVANCED PRACTICE MIDWIFE

## 2021-10-20 ASSESSMENT — EDINBURGH POSTNATAL DEPRESSION SCALE (EPDS)
THINGS HAVE BEEN GETTING ON TOP OF ME: NO, MOST OF THE TIME I HAVE COPED QUITE WELL
I HAVE LOOKED FORWARD WITH ENJOYMENT TO THINGS: AS MUCH AS I EVER DID
I HAVE BEEN SO UNHAPPY THAT I HAVE HAD DIFFICULTY SLEEPING: NOT AT ALL
I HAVE BEEN ANXIOUS OR WORRIED FOR NO GOOD REASON: NO, NOT AT ALL
I HAVE BEEN SO UNHAPPY THAT I HAVE BEEN CRYING: NO, NEVER
THE THOUGHT OF HARMING MYSELF HAS OCCURRED TO ME: NEVER
I HAVE BLAMED MYSELF UNNECESSARILY WHEN THINGS WENT WRONG: NOT VERY OFTEN
I HAVE FELT SCARED OR PANICKY FOR NO GOOD REASON: NO, NOT AT ALL
I HAVE FELT SAD OR MISERABLE: NO, NOT AT ALL
TOTAL SCORE: 2
I HAVE BEEN ABLE TO LAUGH AND SEE THE FUNNY SIDE OF THINGS: AS MUCH AS I ALWAYS COULD

## 2021-10-20 NOTE — PATIENT INSTRUCTIONS
"  1.  Use good positioning for deep latch, with baby held close to body and baby's head/shoulders/hips in good alignment.  When in a seated position, use a pillow to help bring baby close to breasts, and stepstool to elevate your knees above hips.   2.  For the fast milk letdown that makes it difficult for Khai to stay latched to the breast, try using the laid-back nursing position.  You can also use one hand to gently block some milk ducts during letdown and help baby more easily cope with flow.  You can also try taking baby off of breast when the strong milk letdown starts, and allow milk to flow out into burp cloth, re-latching baby after flow has slowed.  3.  To continue to nurse baby on cue, 8-12 times each day.  Feed on one side until baby finishes swallowing.  Once swallowing slows, use breast compression to encourage more swallowing, but once there is no more active swallowing, and baby is either sleeping, coming off the breast, or just \"nibbling,\" it is OK to use a finger to take baby off the breast and move to the other breast.  Do the same on the other side.  Offer both breasts at each feeding.  It is more important to watch the baby than the clock!   4.  If Khai doesn't take that second side, you don't need to pump that side unless you are really uncomfortable, in which case you could pump just a little, enough to relieve the pressure.   5.  Consider trying the laid-back or sidelying positions as other comfortable options.  6.  You can also try using a little gentle pressure on Khai' chin to help him open more widely when nursing;  This may help with his loss of suction during your rapid milk flow.  7.  See pediatric provider as planned, and lactation as needed.     ______________    For good videos on the laid-back breastfeeding position:  [email or web address]  [email or web address]    ____________    For a good video showing sidelying breastfeeding: " "  Https://www.youtube.com/watch?v=[email or web address]    ___________     Strategies to Reduce Milk Ejection Force  When a mother produces a large volume of milk, her milk ejection reflex will be stronger. All that milk rushing down the ducts may be more than baby can handle. It's like trying to drink from a garden hose that is turned on full-blast, while lying down on your back. In addition to choking, sputtering, and coughing when the milk comes out, baby may try to control the milk flow by pulling away and adapting a shallow latch. Shallow latching can be very painful for a mother. Or he may simply clamp down in an attempt to slow the flow, resulting in a \"biting\" sensation. Baby may also scream and arch his back to let you know that something needs to be changed.    It is sometimes recommended that mothers who have oversupply or an overactive milk ejection (let-down) hand-express or pump an ounce or so of milk prior to feeding to help slow the milk flow. However, this practice tends to be counterproductive, because removing that much milk from the breasts increases milk production, which in turn will increase the force of flow even more.    There are many strategies that can help manage a forceful milk ejection without increasing milk production. Placing the baby in a more upright position, so that his head is higher than the breast, will allow him more control during the feeding. Or position yourself so that you are leaning backwards, with the baby almost on top of you after he latches. In this position the milk has to flow \"uphill,\" which will reduce the force of your milk ejection reflex.    Some mothers find that a \"scissors\" hold on the areola, with the nipple between the forefinger and middle finger, helps restrict the flow of milk. Another option is to apply pressure on the side of your breast with the heel of your hand. (Try to vary the position of your hand to avoid constricting the ducts and inadvertently " causing a plug.) You may also find it helpful to breastfeed just as your baby is waking from naps. He will suck more gently when he is still sleepy.    If baby starts to choke/sputter, unlatch him and let the excess milk spray into a towel or cloth. Relatch him when the force of the milk ejection has lessened.  Many mothers with oversupply find that nursing in a side-lying position makes feedings go more smoothly because milk flows from the breast horizontally without the force of gravity and babies can let excess milk dribble downward from their mouths rather than having to swallow it all. (Place a towel under you and baby to absorb the extra milk.) Use a rolled-up receiving blanket against your baby's back to keep him from rolling away from you. If you are still lying down together at the next feeding and are ready to offer the other breast, you can just roll with your baby onto your other side, so that the second breast is against the bed and not flowing downhill with increasing force.    You may find that the side-lying position is somewhat difficult to master and that it is not as easy to get a good latch when lying down. Sometimes it helps to place baby with nipple pointing at his nose so that his neck is extended and he is looking up toward the breast as he latches. Some mothers latch baby onto the breast while sitting up and then slowly slide down into a side-lying position while holding baby gently but firmly so he stays attached. One great benefit of learning to nurse lying down is that you can drift off to sleep while your baby nurses. Don't worry that your baby will have difficulty breathing; babies choose air over food. So long as there are no pillows or blankets around his head, he will be able to move his head freely when he is finished nursing. He may rest his head against your breast as if it were a pillow. (Note that mothers with very large, pillowy breasts need to take special care that baby has  room to move his head.) For more information about the side-lying position, see our (forthcoming) Side-Lying FAQ.    Some babies cope with their mothers' strong milk ejection by taking only a little milk at a time, stopping and starting frequently. It is almost as if they are enjoying several courses to their meal. This is absolutely fine; allow him to come on and off the breast as he needs to, making sure to keep offering your baby the same breast for each course so that he has the opportunity to get the cream.    Although it can be tempting to stretch out feedings when your nipples are sore, feeding baby before he gets extremely hungry will keep him from sucking too aggressively, which not only hurts when nipples are already sore, but can cause further nipple damage. An overly strong suck can also cause a stronger milk ejection, making the feeding more difficult.

## 2021-11-07 NOTE — ADDENDUM NOTE
Addendum  created 11/06/21 1958 by Radha Peña MD    Clinical Note Signed, Diagnosis association updated, Intraprocedure Blocks edited, Intraprocedure Event edited

## 2022-04-27 ENCOUNTER — TELEPHONE (OUTPATIENT)
Dept: OBGYN | Facility: CLINIC | Age: 37
End: 2022-04-27

## 2022-04-27 ENCOUNTER — LAB (OUTPATIENT)
Dept: LAB | Facility: CLINIC | Age: 37
End: 2022-04-27

## 2022-04-27 DIAGNOSIS — O20.9 FIRST TRIMESTER BLEEDING: Primary | ICD-10-CM

## 2022-04-27 DIAGNOSIS — N89.8 VAGINAL DISCHARGE: ICD-10-CM

## 2022-04-27 DIAGNOSIS — O20.9 FIRST TRIMESTER BLEEDING: ICD-10-CM

## 2022-04-27 DIAGNOSIS — O21.9 NAUSEA/VOMITING IN PREGNANCY: ICD-10-CM

## 2022-04-27 LAB
B-HCG SERPL-ACNC: ABNORMAL IU/L (ref 0–5)
CLUE CELLS: ABNORMAL
TRICHOMONAS, WET PREP: ABNORMAL
WBC'S/HIGH POWER FIELD, WET PREP: ABNORMAL
YEAST, WET PREP: ABNORMAL

## 2022-04-27 PROCEDURE — 84702 CHORIONIC GONADOTROPIN TEST: CPT

## 2022-04-27 PROCEDURE — 87210 SMEAR WET MOUNT SALINE/INK: CPT

## 2022-04-27 PROCEDURE — 36415 COLL VENOUS BLD VENIPUNCTURE: CPT

## 2022-04-27 RX ORDER — ONDANSETRON 4 MG/1
4 TABLET, ORALLY DISINTEGRATING ORAL EVERY 8 HOURS PRN
Qty: 30 TABLET | Refills: 3 | Status: SHIPPED | OUTPATIENT
Start: 2022-04-27 | End: 2022-05-12

## 2022-04-27 NOTE — TELEPHONE ENCOUNTER
Route to provider: bleeding in early pregnancy     Patient reports positive pregnancy test two weeks ago  LMP unknown - either 3/7 or 3/10 (~7w)    Brown spotting began Monday. It is not heavy, describes as a streak on panty liner. TVUS pended. Did not place orders for HCG.     No cramping. No pain.     No recent intercourse or strenuous activity.     Was having increased yellow/clear vaginal discharge and itching prior to Monday. Wet prep pended.     She has an 8 month old and is breastfeeding. Earlier in pregnancy noticed some mild cramping when breastfeeding but this has improved.    OB nurse intake visit is scheduled May 12 as this was the earliest she could be scheduled, wants something for nausea prior, order pended.     Encouraged rest and fluids. Reviewed bleeding precautions.     Okay to leave detailed VM with provider recommendations.     Marely GRANT RN

## 2022-04-27 NOTE — TELEPHONE ENCOUNTER
Telephone call to patient to review provider recommendations. She will go to lab today for wet prep and first HCG.     Marely GRANT RN

## 2022-04-29 ENCOUNTER — LAB (OUTPATIENT)
Dept: LAB | Facility: CLINIC | Age: 37
End: 2022-04-29
Payer: COMMERCIAL

## 2022-04-29 DIAGNOSIS — O20.9 FIRST TRIMESTER BLEEDING: ICD-10-CM

## 2022-04-29 LAB — B-HCG SERPL-ACNC: ABNORMAL IU/L (ref 0–5)

## 2022-04-29 PROCEDURE — 84702 CHORIONIC GONADOTROPIN TEST: CPT

## 2022-04-29 PROCEDURE — 36415 COLL VENOUS BLD VENIPUNCTURE: CPT

## 2022-05-02 ENCOUNTER — TELEPHONE (OUTPATIENT)
Dept: OBGYN | Facility: CLINIC | Age: 37
End: 2022-05-02

## 2022-05-02 ENCOUNTER — VIRTUAL VISIT (OUTPATIENT)
Dept: OBGYN | Facility: CLINIC | Age: 37
End: 2022-05-02
Payer: COMMERCIAL

## 2022-05-02 DIAGNOSIS — U07.1 INFECTION DUE TO 2019 NOVEL CORONAVIRUS: Primary | ICD-10-CM

## 2022-05-02 PROCEDURE — 99212 OFFICE O/P EST SF 10 MIN: CPT | Mod: 95 | Performed by: OBSTETRICS & GYNECOLOGY

## 2022-05-02 NOTE — TELEPHONE ENCOUNTER
[unfilled]     Meagan LIM Porfirio is 8w pregnant with confirmed new positive COVID-19 diagnosis PCR or Antigen test on April 30      Concern for COVID-19  Exactly how many days ago did these symptoms start? April 29 or 30 (2-3 days)     Are any of the following symptoms significant for you?    New or worsening difficulty breathing? No    Worsening cough? Yes, mostly dry, sometimes with mucous.     Fever or chills? No    Headache: no    Sore throat: yes    Chest pain: no    Diarrhea: no    Body aches? YES- tylenol has been helping    What treatments has patient tried? Acetaminophen   Does patient live in a nursing home, group home, or shelter? no  Does patient have a way to get food/medications during quarantined? Yes, I have a friend or family member who can help me.    {Refresh the note after risk score flowsheet is filled out Link to [facility] Flowsheet :30342    Scheduled for telephone visit with Dr. Villarreal today at 3:15

## 2022-05-02 NOTE — PROGRESS NOTES
Meagan is a 37 year old who is being evaluated via a billable telephone visit.      What phone number would you like to be contacted at?[phone]  How would you like to obtain your AVS? Jose Rhodes is a 37 year old who presents for the following health issues: COVID-19 positive   HPI     Meagan Monroy is a 36 YO  at ~ 8 weeks gestation based on positive pregnancy test who recently tested positive for COVID-19. She reported that symptoms started 2-3 days ago with cough, sore throat and body aches, denies fever, chest pain or SOB. Her toddler at home is also sick but her  has tested negative.      We reviewed that there is no evidence that the virus that causes COVID-19 causes birth defects or that mild disease would be associated with adverse pregnancy outcomes but that our data is still limited so we would recommend a level II US and growth US later on in the pregnancy. We also discussed delaying routine care if possible until she is through her quarantine window but that we wouldn't defer medically neccessary care during that timeframe so she should continue to update us with any pregnancy related concerns (bleeding, abdominal pain, nausea/vomiting) despite her COVID-19 diagnosis.     We also discussed the need to monitor at home for worsening symptoms and we discussed that she should be evaluated in the ED with worsening SOB, chest pain, confusion/lethergy or if her SpO2 is reading <95%. We also discussed safe OTC medications that she can take during pregnancy to manage her symptoms as well as the CDC guidelines for quarantine/isolation.     We also discussed COVID-19 therapeutics that are currently authorized for emergency use including monoclonal antibodies and antiviral medications like Paxlovid. We discussed that these medications have not been well studied in pregnant women but due to the increased risk of adverse outcomes among pregnant women these medications should  not be withheld from pregnant patients when the potential benefits outweigh the possible risks. We reviewed her medical history and she has mild asthma with rare albuterol use (no history of intubation/ED visits/hospitalizaiton due to exacerbations) and no other risk factors for severe diease. We discussed that in weighing the risks/benefits of this medication we should also consider that at this stage of pregnancy organogenesis is occurring and while there is no evidence that Paxlovid is teratogenic at the recommended dose, this is the period of most concern for medication exposure. In weighing these risks and benefits, she would like to defer treatment at this time and will closely monitor symptoms at home.     Dispo: Plan for dating and viability ultrasound in 2 weeks with OB visit to follow or sooner PRN     Phone call duration: 14 minutes    Keren Villarreal MD

## 2022-05-12 ENCOUNTER — PRENATAL OFFICE VISIT (OUTPATIENT)
Dept: NURSING | Facility: CLINIC | Age: 37
End: 2022-05-12
Payer: COMMERCIAL

## 2022-05-12 DIAGNOSIS — Z91.199 NO-SHOW FOR APPOINTMENT: Primary | ICD-10-CM

## 2022-05-12 PROBLEM — Z23 NEED FOR TDAP VACCINATION: Status: RESOLVED | Noted: 2021-06-15 | Resolved: 2022-05-12

## 2022-05-12 NOTE — PROGRESS NOTES
This patient was a no show for this scheduled appointment.    Left 3 messages, has NOB and Ultrasound 5/20/2022    Shoshana Pringle LPN

## 2022-05-19 ENCOUNTER — PRENATAL OFFICE VISIT (OUTPATIENT)
Dept: NURSING | Facility: CLINIC | Age: 37
End: 2022-05-19

## 2022-05-19 DIAGNOSIS — Z34.80 PRENATAL CARE, SUBSEQUENT PREGNANCY, UNSPECIFIED TRIMESTER: Primary | ICD-10-CM

## 2022-05-19 PROCEDURE — 99207 PR NO CHARGE NURSE ONLY: CPT

## 2022-05-19 NOTE — PROGRESS NOTES
Important Information for Provider: Patient is Breastfeeding and had her first period in February and March period was an estimate.    Prenatal OB Questionnaire      Allergies as of 5/19/2022:    Allergies as of 05/19/2022     (No Known Allergies)       Current medications are:  Current Outpatient Medications   Medication Sig Dispense Refill     Cetirizine HCl (ZYRTEC PO)        cholecalciferol (VITAMIN D3) 25 mcg (1000 units) capsule Take 3 capsules by mouth daily       Prenatal Vit-Fe Fumarate-FA (PRENATAL MULTIVITAMIN W/IRON) 27-0.8 MG tablet Take 1 tablet by mouth daily 90 tablet 3     Probiotic Product (PROBIOTIC-10) CHEW        acetaminophen (TYLENOL) 325 MG tablet Take 2 tablets (650 mg) by mouth every 6 hours as needed for mild pain Start after Delivery. (Patient not taking: Reported on 5/19/2022)           Early ultrasound screening tool:    Does patient have irregular periods?  Only have had Feb and March period  Did patient use hormonal birth control in the three months prior to positive urine pregnancy test? No  Is the patient breastfeeding?  Yes  Is the patient 10 weeks or greater at time of education visit?  Yes    New Prenatal Education:  Date of Positive Pregnancy test: First test 5 weeks  Dating: LMP 03/10/22- estimated  Regular periods? yes     Reviewed and updated medication, surgical, family and OB histories? Updated    Reviewed Medications? Reviewed    Exercise? 3days a week, online [employer] [place] class    -Drink about 8-10 glasses of water a day or about  ounces  -Caffeine: limit to about 200mg/day equivalent of 1.5 cups of coffee  -Avoid Ibuprofen, Aspirin and Naproxen (aka Aleeve), it is ok to take Tylenol in pregnancy  -Avoid changing cat litter due to increase risk of toxoplasmosis    -Glucose tolerance test at 24-28 weeks gestation, this is a 1 hour test and fasting is not necessary  -If 1 hour test is abnormal a 3 hour GTT is completed as follow up to rule out Gestational  Diabetes    [place]- it is ok to have intercourse during pregnancy unless advised not to by your provider.     Over the counter medications that are safe to take during pregnancy (hand out)  Diet; Review in your booklet pg 33  Exercise: No contact sports or activities that can result in falls. No skydiving or scuba diving. No Whirlpools or saunas, hot yoga or hot pilates.   Pools, lakes, bathes and showers are ok.  Travel: No travel 4-6 weeks out from due date           - Planes/lengthy care rides (get up every couple of hours to avoid DVT)           -Seatbelts: Wear under belly, not across           -Airbags: Back up seat    What to expect- Prenatal Appointments  Uncomplicated pregnancies  -Every 4 weeks until 28 weeks gestation  -Every 2 weeks from 28-36 weeks  -Weekly until delivery    Screenings:  First and Second trimester  -Occurs with MFM dept (US, Blood Draw, and mtg with [facility] Counselor)  -NIPT (Blood draw with M dept, or in clinic and sent to 3rd party for testing)  -Check insurance for coverage.   -Quad screening is part of the prenatal package    Dentist:  Stay up to date with dental checks. Pregnant women are at an increased risk for tooth decay and sensitive gums. Use a soft bristled tooth brush.     Self-Care:  Ok to color hair, paint nails, or get massages    Vaccines:  TDAP is usually repeated during pregnancy (partner as well), flu shot during flu season and Hepatitis B if not immune or up to date.    Resources:  Nurse Line, Great Expectations booklet, [place] for [facility]    Hospital Registration:  Early registration for hospital/delivery around 20 weeks  Bring FMLA in around 20 weeks, can take 7-10 business days to complete    Pregnancy Classes:  [employer] in [facility], and [facility]        If your are bleeding or spotting or cramping for greater than 3 hours contact your clinic, although this could be benign it should be assessed by your provider/care team.   If you  start to have severe cramping or heavy bleeding (saturating a pad an hour for greater than 2 hours), fever, sweating, dizziness/weakness, please do not wait on hold, please go to the emergency room  Nausea and vomiting can occur during pregnancy but if you are unable to keep fluids down for greater than 24 hours then you need to be seen in the emergency room      Clinic hours and contact information:  Monday thru Friday 8am to 5pm   [phone]    Questions covered about :    TDAP- for partner- care providers     BF mild cramping, only during morning feed. Drink fluids and if doesn't resolve call clinic. Inform provider during appt as well.     Padma GARCIA RN

## 2022-05-20 ENCOUNTER — TRANSCRIBE ORDERS (OUTPATIENT)
Dept: MATERNAL FETAL MEDICINE | Facility: CLINIC | Age: 37
End: 2022-05-20

## 2022-05-20 ENCOUNTER — ANCILLARY PROCEDURE (OUTPATIENT)
Dept: ULTRASOUND IMAGING | Facility: CLINIC | Age: 37
End: 2022-05-20
Attending: OBSTETRICS & GYNECOLOGY
Payer: COMMERCIAL

## 2022-05-20 ENCOUNTER — PRENATAL OFFICE VISIT (OUTPATIENT)
Dept: OBGYN | Facility: CLINIC | Age: 37
End: 2022-05-20
Attending: OBSTETRICS & GYNECOLOGY
Payer: COMMERCIAL

## 2022-05-20 VITALS
DIASTOLIC BLOOD PRESSURE: 80 MMHG | BODY MASS INDEX: 25.32 KG/M2 | HEART RATE: 81 BPM | SYSTOLIC BLOOD PRESSURE: 120 MMHG | WEIGHT: 147.5 LBS

## 2022-05-20 DIAGNOSIS — Z34.80 PRENATAL CARE, SUBSEQUENT PREGNANCY, UNSPECIFIED TRIMESTER: ICD-10-CM

## 2022-05-20 DIAGNOSIS — O09.521 MULTIGRAVIDA OF ADVANCED MATERNAL AGE IN FIRST TRIMESTER: Primary | ICD-10-CM

## 2022-05-20 DIAGNOSIS — O26.90 PREGNANCY RELATED CONDITION, ANTEPARTUM: Primary | ICD-10-CM

## 2022-05-20 DIAGNOSIS — O20.9 FIRST TRIMESTER BLEEDING: ICD-10-CM

## 2022-05-20 LAB
ABO/RH(D): NORMAL
ANTIBODY SCREEN: NEGATIVE
ERYTHROCYTE [DISTWIDTH] IN BLOOD BY AUTOMATED COUNT: 12.6 % (ref 10–15)
HBV SURFACE AG SERPL QL IA: NONREACTIVE
HCT VFR BLD AUTO: 37.1 % (ref 35–47)
HGB BLD-MCNC: 12.2 G/DL (ref 11.7–15.7)
HIV 1+2 AB+HIV1 P24 AG SERPL QL IA: NONREACTIVE
MCH RBC QN AUTO: 30 PG (ref 26.5–33)
MCHC RBC AUTO-ENTMCNC: 32.9 G/DL (ref 31.5–36.5)
MCV RBC AUTO: 91 FL (ref 78–100)
PLATELET # BLD AUTO: 311 10E3/UL (ref 150–450)
RBC # BLD AUTO: 4.07 10E6/UL (ref 3.8–5.2)
SPECIMEN EXPIRATION DATE: NORMAL
WBC # BLD AUTO: 9.7 10E3/UL (ref 4–11)

## 2022-05-20 PROCEDURE — 76801 OB US < 14 WKS SINGLE FETUS: CPT | Performed by: OBSTETRICS & GYNECOLOGY

## 2022-05-20 PROCEDURE — 87389 HIV-1 AG W/HIV-1&-2 AB AG IA: CPT | Performed by: OBSTETRICS & GYNECOLOGY

## 2022-05-20 PROCEDURE — 86762 RUBELLA ANTIBODY: CPT | Performed by: OBSTETRICS & GYNECOLOGY

## 2022-05-20 PROCEDURE — 99207 PR FIRST OB VISIT: CPT | Performed by: OBSTETRICS & GYNECOLOGY

## 2022-05-20 PROCEDURE — 86900 BLOOD TYPING SEROLOGIC ABO: CPT | Performed by: OBSTETRICS & GYNECOLOGY

## 2022-05-20 PROCEDURE — 86901 BLOOD TYPING SEROLOGIC RH(D): CPT | Performed by: OBSTETRICS & GYNECOLOGY

## 2022-05-20 PROCEDURE — 87340 HEPATITIS B SURFACE AG IA: CPT | Performed by: OBSTETRICS & GYNECOLOGY

## 2022-05-20 PROCEDURE — 87086 URINE CULTURE/COLONY COUNT: CPT | Performed by: OBSTETRICS & GYNECOLOGY

## 2022-05-20 PROCEDURE — 86850 RBC ANTIBODY SCREEN: CPT | Performed by: OBSTETRICS & GYNECOLOGY

## 2022-05-20 PROCEDURE — 36415 COLL VENOUS BLD VENIPUNCTURE: CPT | Performed by: OBSTETRICS & GYNECOLOGY

## 2022-05-20 PROCEDURE — 85027 COMPLETE CBC AUTOMATED: CPT | Performed by: OBSTETRICS & GYNECOLOGY

## 2022-05-20 PROCEDURE — 86780 TREPONEMA PALLIDUM: CPT | Performed by: OBSTETRICS & GYNECOLOGY

## 2022-05-20 NOTE — PROGRESS NOTES
SUBJECTIVE: Meagan Mcgowan is a 37 year old   here for initial OB visit.  Doing ok. Nausea, fatigue.   Here with  Young.   Delivered 2021 by CS for abnormal fetal heart tones. Developed postpartum pre-eclampsia and required meds for a period.  Unplanned but welcomed pregnancy.   Still breastfeeding, having a lot of breast tenderness. Does not want to stop breastfeeding due to the formula shortage.     Past Medical History:   Diagnosis Date     Abnormal Pap smear of cervix      Anemia      Mild asthma        Past Surgical History:   Procedure Laterality Date      SECTION N/A 2021    Procedure:  SECTION;  Surgeon: Julissa Garcia MD;  Location:  L+D     wisdom teeth         Family History   Problem Relation Age of Onset     Asthma Mother      Tongue cancer Father      Hypertension Father      Diabetes Paternal Grandmother      Heart Failure Paternal Grandmother      Childhood Heart Disease Brother        Social History     Socioeconomic History     Marital status:      Spouse name: Not on file     Number of children: Not on file     Years of education: Not on file     Highest education level: Not on file   Occupational History     Not on file   Tobacco Use     Smoking status: Never Smoker     Smokeless tobacco: Never Used   Substance and Sexual Activity     Alcohol use: Not Currently     Drug use: Never     Sexual activity: Yes     Partners: Male   Other Topics Concern     Not on file   Social History Narrative     Not on file     Social Determinants of Health     Financial Resource Strain: Not on file   Food Insecurity: Not on file   Transportation Needs: Not on file   Physical Activity: Not on file   Stress: Not on file   Social Connections: Not on file   Intimate Partner Violence: Not on file   Housing Stability: Not on file         Current Outpatient Medications:      acetaminophen (TYLENOL) 325 MG tablet, Take 2 tablets (650 mg) by mouth every 6 hours as needed  for mild pain Start after Delivery. (Patient not taking: Reported on 5/19/2022), Disp: , Rfl:      Cetirizine HCl (ZYRTEC PO), , Disp: , Rfl:      cholecalciferol (VITAMIN D3) 25 mcg (1000 units) capsule, Take 3 capsules by mouth daily, Disp: , Rfl:      Prenatal Vit-Fe Fumarate-FA (PRENATAL MULTIVITAMIN W/IRON) 27-0.8 MG tablet, Take 1 tablet by mouth daily, Disp: 90 tablet, Rfl: 3     Probiotic Product (PROBIOTIC-10) CHEW, , Disp: , Rfl:     No Known Allergies      Past Medical History of Father of Baby: No significant medical history    Review of Systems:   Constitutional, HEENT, cardiovascular, pulmonary, gi and gu systems are negative, except as otherwise noted.     History Since Last Menstrual Period: Nausea    EXAM:   Vitals:    05/20/22 1209   BP: 120/80   Pulse: 81   Weight: 66.9 kg (147 lb 8 oz)     Body mass index is 25.32 kg/m .  GENERAL APPEARANCE: healthy, alert and no distress  EYES: EOMI,  PERRL  HENT: Nose and mouth without ulcers or lesions  NECK: no adenopathy, no asymmetry, masses, or scars and thyroid normal to palpation  RESP: lungs clear to auscultation - no rales, rhonchi or wheezes  BREAST: normal without masses, tenderness or nipple discharge and no palpable axillary masses or adenopathy  CV: regular rates and rhythm, normal S1 S2, no S3 or S4 and no murmur, click or rub -  ABDOMEN:  soft, nontender, no HSM or masses and bowel sounds normal  : normal cervix, adnexae, and uterus without masses or discharge  MS: extremities normal- no gross deformities noted, no evidence of inflammation in joints, FROM in all extremities.  SKIN: no suspicious lesions or rashes  NEURO: Normal strength and tone, sensory exam grossly normal, mentation intact and speech normal  PSYCH: mentation appears normal and affect normal/bright  LYMPHATICS: No axillary, cervical, inguinal, or supraclavicular nodes    Pelvix exam:  Perineum: Intact;   Vulva: Normal;  Vagina: Normal mucosa, no discharge,   Cervix: Parous,  closed, mobile, no discharge;  Uterus: 13 weeks, Normal shape, position and consistency;   Adnexa: Normal;  Anus: Normal without lesion or mass;   Bony Pelvis: Adequate.     Ultrasound: Formal US today, GA 13 weeks 3 days, EDC 22.     ASSESSMENT/ PLAN:  Meagan Mcgowan is a 37 year old   at 13 weeks 3 days by ultrasound today inconsistent with LMP.   Follow up in 3 weeks.  Normal exercise.  Normal sexual activity.  Prenatal vitamins.  Anticipated weight gain:  BMI <25: 25-35 pounds  Start low dose aspirin  TDaP 27-36 weeks  Oriented to practice, PNC  Discussed aneuploidy screening, desires. Ordered first tri screen/NIPT and comprehensive fetal survey for AMA  Plan third tri growth US for covid in pregnancy.   Planning repeat CS  RTC 3 weeks

## 2022-05-21 LAB
BACTERIA UR CULT: NORMAL
T PALLIDUM AB SER QL: NONREACTIVE

## 2022-05-23 LAB
RUBV IGG SERPL QL IA: 2.19 INDEX
RUBV IGG SERPL QL IA: POSITIVE

## 2022-06-13 ENCOUNTER — PRE VISIT (OUTPATIENT)
Dept: MATERNAL FETAL MEDICINE | Facility: CLINIC | Age: 37
End: 2022-06-13
Payer: COMMERCIAL

## 2022-06-14 ENCOUNTER — OFFICE VISIT (OUTPATIENT)
Dept: MATERNAL FETAL MEDICINE | Facility: CLINIC | Age: 37
End: 2022-06-14
Attending: OBSTETRICS & GYNECOLOGY
Payer: COMMERCIAL

## 2022-06-14 DIAGNOSIS — O09.522 MULTIGRAVIDA OF ADVANCED MATERNAL AGE IN SECOND TRIMESTER: Primary | ICD-10-CM

## 2022-06-14 DIAGNOSIS — O26.90 PREGNANCY RELATED CONDITION, ANTEPARTUM: ICD-10-CM

## 2022-06-14 PROCEDURE — 36415 COLL VENOUS BLD VENIPUNCTURE: CPT | Performed by: GENETIC COUNSELOR, MS

## 2022-06-14 PROCEDURE — 96040 HC GENETIC COUNSELING, EACH 30 MINUTES: CPT | Performed by: GENETIC COUNSELOR, MS

## 2022-06-14 PROCEDURE — 82105 ALPHA-FETOPROTEIN SERUM: CPT | Performed by: GENETIC COUNSELOR, MS

## 2022-06-16 ENCOUNTER — TELEPHONE (OUTPATIENT)
Dept: MATERNAL FETAL MEDICINE | Facility: CLINIC | Age: 37
End: 2022-06-16
Payer: COMMERCIAL

## 2022-06-16 LAB
# FETUSES US: NORMAL
AFP MOM SERPL: 1.27
AFP SERPL-MCNC: 50 NG/ML
AGE - REPORTED: 39.7 YR
CURRENT SMOKER: NO
FAMILY MEMBER DISEASES HX: NO
GA METHOD: NORMAL
GA: NORMAL WK
IDDM PATIENT QL: NO
INTEGRATED SCN PATIENT-IMP: NORMAL
SPECIMEN DRAWN SERPL: NORMAL

## 2022-06-16 NOTE — TELEPHONE ENCOUNTER
June 16, 2022     I spoke with Meagan regarding her maternal serum alpha fetoprotein screen results.     The AFP was 1.27 MoM, which is normal. These results indicate a 1 in 5680 chance for open neural tube defects in the fetus.      A copy of these results are available in her EPIC chart for her primary OB to review.    Dianelys Manzano MS, [facility]  Licensed Genetic Counselor  [facility]  Maternal Fetal Medicine  [email or web address]  [phone]

## 2022-06-20 ENCOUNTER — TELEPHONE (OUTPATIENT)
Dept: MATERNAL FETAL MEDICINE | Facility: CLINIC | Age: 37
End: 2022-06-20
Payer: COMMERCIAL

## 2022-06-20 ENCOUNTER — PRENATAL OFFICE VISIT (OUTPATIENT)
Dept: OBGYN | Facility: CLINIC | Age: 37
End: 2022-06-20
Payer: COMMERCIAL

## 2022-06-20 VITALS
DIASTOLIC BLOOD PRESSURE: 78 MMHG | HEART RATE: 93 BPM | OXYGEN SATURATION: 98 % | SYSTOLIC BLOOD PRESSURE: 117 MMHG | WEIGHT: 153 LBS | BODY MASS INDEX: 26.26 KG/M2

## 2022-06-20 DIAGNOSIS — O09.522 MULTIGRAVIDA OF ADVANCED MATERNAL AGE IN SECOND TRIMESTER: Primary | ICD-10-CM

## 2022-06-20 LAB — SCANNED LAB RESULT: NORMAL

## 2022-06-20 PROCEDURE — 99207 PR PRENATAL VISIT: CPT | Performed by: OBSTETRICS & GYNECOLOGY

## 2022-06-20 NOTE — TELEPHONE ENCOUNTER
June 20, 2022    I left a message for Meagan regarding her NIPT results.     Results indicate NO ANEUPLOIDY DETECTED for chromosomes 21, 18, 13, or the sex chromosomes.     This puts her current pregnancy at low risk for Down syndrome, trisomy 18, trisomy 13 and sex chromosome abnormalities. This test is reported to have the following sensitivities: Down syndrome- >99.9%, trisomy 18- >99.9%, and trisomy 13- >99.9%. Although these results are reassuring, this does not replace a standard chromosome analysis from a chorionic villus sampling or amniocentesis.     Level II ultrasound is recommended, given AMA.     Her results are available in her Epic chart for her primary OB to review.     Dianelys Manzano MS, [facility]  Licensed Genetic Counselor  [facility]  Maternal Fetal Medicine  [email or web address]  [phone]

## 2022-06-20 NOTE — PROGRESS NOTES
Doing well.   Nausea abating.   Does have some intermittent cramping - improves with hydration.  Ultrasound in 2 weeks.   NIPT pending, AFP today.

## 2022-07-01 ENCOUNTER — OFFICE VISIT (OUTPATIENT)
Dept: MATERNAL FETAL MEDICINE | Facility: CLINIC | Age: 37
End: 2022-07-01
Attending: OBSTETRICS & GYNECOLOGY
Payer: COMMERCIAL

## 2022-07-01 ENCOUNTER — PRENATAL OFFICE VISIT (OUTPATIENT)
Dept: OBGYN | Facility: CLINIC | Age: 37
End: 2022-07-01
Payer: COMMERCIAL

## 2022-07-01 ENCOUNTER — HOSPITAL ENCOUNTER (OUTPATIENT)
Dept: ULTRASOUND IMAGING | Facility: CLINIC | Age: 37
Discharge: HOME | End: 2022-07-01
Attending: OBSTETRICS & GYNECOLOGY
Payer: COMMERCIAL

## 2022-07-01 VITALS
WEIGHT: 156 LBS | DIASTOLIC BLOOD PRESSURE: 68 MMHG | HEART RATE: 63 BPM | SYSTOLIC BLOOD PRESSURE: 117 MMHG | BODY MASS INDEX: 26.78 KG/M2 | OXYGEN SATURATION: 96 %

## 2022-07-01 DIAGNOSIS — O98.512 COVID-19 AFFECTING PREGNANCY IN SECOND TRIMESTER: ICD-10-CM

## 2022-07-01 DIAGNOSIS — O26.90 PREGNANCY RELATED CONDITION, ANTEPARTUM: ICD-10-CM

## 2022-07-01 DIAGNOSIS — O09.522 MULTIGRAVIDA OF ADVANCED MATERNAL AGE IN SECOND TRIMESTER: Primary | ICD-10-CM

## 2022-07-01 DIAGNOSIS — O09.522 AMA (ADVANCED MATERNAL AGE) MULTIGRAVIDA 35+, SECOND TRIMESTER: Primary | ICD-10-CM

## 2022-07-01 DIAGNOSIS — U07.1 COVID-19 AFFECTING PREGNANCY IN SECOND TRIMESTER: ICD-10-CM

## 2022-07-01 PROCEDURE — 76811 OB US DETAILED SNGL FETUS: CPT | Mod: 26 | Performed by: OBSTETRICS & GYNECOLOGY

## 2022-07-01 PROCEDURE — 76811 OB US DETAILED SNGL FETUS: CPT

## 2022-07-01 PROCEDURE — 99207 PR PRENATAL VISIT: CPT | Performed by: OBSTETRICS & GYNECOLOGY

## 2022-07-01 NOTE — PROGRESS NOTES
"Please see \"Imaging\" tab under \"Chart Review\" for details of today's visit.    Kiara Srinivasan    "

## 2022-07-01 NOTE — PROGRESS NOTES
Doing well.   Not yet feeling regular movement.   Comprehensive fetal survey normal today - did not disclose fetal sex.  Cramping has gone away.   RTC 5 weeks, GCT then

## 2022-08-05 ENCOUNTER — PRENATAL OFFICE VISIT (OUTPATIENT)
Dept: OBGYN | Facility: CLINIC | Age: 37
End: 2022-08-05
Payer: COMMERCIAL

## 2022-08-05 VITALS
HEART RATE: 44 BPM | HEIGHT: 64 IN | SYSTOLIC BLOOD PRESSURE: 126 MMHG | DIASTOLIC BLOOD PRESSURE: 79 MMHG | BODY MASS INDEX: 27.16 KG/M2 | OXYGEN SATURATION: 99 % | WEIGHT: 159.1 LBS

## 2022-08-05 DIAGNOSIS — O09.522 MULTIGRAVIDA OF ADVANCED MATERNAL AGE IN SECOND TRIMESTER: ICD-10-CM

## 2022-08-05 LAB
GLUCOSE 1H P 50 G GLC PO SERPL-MCNC: 126 MG/DL (ref 70–129)
HGB BLD-MCNC: 12 G/DL (ref 11.7–15.7)

## 2022-08-05 PROCEDURE — 82950 GLUCOSE TEST: CPT | Performed by: OBSTETRICS & GYNECOLOGY

## 2022-08-05 PROCEDURE — 99207 PR PRENATAL VISIT: CPT | Performed by: OBSTETRICS & GYNECOLOGY

## 2022-08-05 PROCEDURE — 36415 COLL VENOUS BLD VENIPUNCTURE: CPT | Performed by: OBSTETRICS & GYNECOLOGY

## 2022-08-05 NOTE — PROGRESS NOTES
Childbirth classes? NO  Plan on breastfeeding? Yes: still breastfeeding her first child. Weaning now. Had a good experience.   Birthcontrol? IUD or pill  Sex on ultrasound? did not determine  Circumsion? Yes  Peds doc? [facility]    Good fetal movement.   GCT today normal.  Having some insomnia. Discussed strategies.  Has a rash in right axilla. Looks like a series of bug bites. Has a separate red spot, superficial, pruritic on right breast. Not in dermatome of shingles. Present x 1-2 weeks. Plan to observe for one week, derm if not improving.   RTC 4 weeks

## 2022-09-09 ENCOUNTER — PRENATAL OFFICE VISIT (OUTPATIENT)
Dept: OBGYN | Facility: CLINIC | Age: 37
End: 2022-09-09
Payer: COMMERCIAL

## 2022-09-09 VITALS
BODY MASS INDEX: 29.21 KG/M2 | WEIGHT: 170.2 LBS | HEART RATE: 92 BPM | SYSTOLIC BLOOD PRESSURE: 127 MMHG | OXYGEN SATURATION: 97 % | DIASTOLIC BLOOD PRESSURE: 88 MMHG

## 2022-09-09 DIAGNOSIS — M54.9 BACK PAIN AFFECTING PREGNANCY IN THIRD TRIMESTER: ICD-10-CM

## 2022-09-09 DIAGNOSIS — Z23 NEED FOR PROPHYLACTIC VACCINATION AND INOCULATION AGAINST INFLUENZA: ICD-10-CM

## 2022-09-09 DIAGNOSIS — O09.523 MULTIGRAVIDA OF ADVANCED MATERNAL AGE IN THIRD TRIMESTER: Primary | ICD-10-CM

## 2022-09-09 DIAGNOSIS — O99.891 BACK PAIN AFFECTING PREGNANCY IN THIRD TRIMESTER: ICD-10-CM

## 2022-09-09 DIAGNOSIS — Z98.891 H/O CESAREAN SECTION: ICD-10-CM

## 2022-09-09 DIAGNOSIS — Z23 NEED FOR TDAP VACCINATION: ICD-10-CM

## 2022-09-09 PROCEDURE — 90472 IMMUNIZATION ADMIN EACH ADD: CPT | Performed by: OBSTETRICS & GYNECOLOGY

## 2022-09-09 PROCEDURE — 90471 IMMUNIZATION ADMIN: CPT | Performed by: OBSTETRICS & GYNECOLOGY

## 2022-09-09 PROCEDURE — 99207 PR PRENATAL VISIT: CPT | Performed by: OBSTETRICS & GYNECOLOGY

## 2022-09-09 PROCEDURE — 90715 TDAP VACCINE 7 YRS/> IM: CPT | Performed by: OBSTETRICS & GYNECOLOGY

## 2022-09-09 PROCEDURE — 90686 IIV4 VACC NO PRSV 0.5 ML IM: CPT | Performed by: OBSTETRICS & GYNECOLOGY

## 2022-09-09 NOTE — Clinical Note
I forgot to put comments:  Surgical Assistant: No Multi Surgeon Case No H&P:  Pre-op options: Surgeon Post-op:  6 weeks Sterilization consent:  Not applicable to procedure being performed. Vendor: No Surgical time needed: Average ERAS patient: Yes

## 2022-09-09 NOTE — NURSING NOTE
Clinic Administered Medication Documentation          Injectable Medication Documentation    Patient was given flu vac. Prior to medication administration, verified patients identity using patient s name and date of birth. Please see MAR and medication order for additional information. Patient instructed to remain in clinic for 15 minutes.      Was entire vial of medication used? Yes  Vial/Syringe: Single dose vial  Expiration Date:  6/30/23  Was this medication supplied by the patient? No     Clinic Administered Medication Documentation          Injectable Medication Documentation    Patient was given tdap. Prior to medication administration, verified patients identity using patient s name and date of birth. Please see MAR and medication order for additional information. Patient instructed to remain in clinic for 15 minutes.      Was entire vial of medication used? Yes  Vial/Syringe: Single dose vial  Expiration Date:  9/20/24  Was this medication supplied by the patient? No

## 2022-09-09 NOTE — PROGRESS NOTES
Doing ok.   Lots of stress at work.   Good fetal movement.   Having back pain, doing belly band and stretches.   Round ligament pain on right. Has only a belly band that goes under the belly - fit for a new one today.   TDaP and flu today. COVID booster next visit.   RTC 3 weeks

## 2022-09-12 ENCOUNTER — TELEPHONE (OUTPATIENT)
Dept: OBGYN | Facility: CLINIC | Age: 37
End: 2022-09-12

## 2022-09-12 NOTE — TELEPHONE ENCOUNTER
LVMTCB to discuss c/s scheduling.     Shayy  She/her/hers  [facility] Scheduler

## 2022-09-27 ENCOUNTER — DOCUMENTATION ONLY (OUTPATIENT)
Dept: OBGYN | Facility: CLINIC | Age: 37
End: 2022-09-27

## 2022-09-27 DIAGNOSIS — M54.9 BACK PAIN AFFECTING PREGNANCY IN THIRD TRIMESTER: ICD-10-CM

## 2022-09-27 DIAGNOSIS — S39.012A STRAIN OF LUMBAR REGION, INITIAL ENCOUNTER: Primary | ICD-10-CM

## 2022-09-27 DIAGNOSIS — O99.891 BACK PAIN AFFECTING PREGNANCY IN THIRD TRIMESTER: ICD-10-CM

## 2022-09-30 ENCOUNTER — HOSPITAL ENCOUNTER (OUTPATIENT)
Facility: CLINIC | Age: 37
Discharge: HOME | End: 2022-09-30
Attending: OBSTETRICS & GYNECOLOGY | Admitting: OBSTETRICS & GYNECOLOGY
Payer: COMMERCIAL

## 2022-09-30 ENCOUNTER — APPOINTMENT (OUTPATIENT)
Dept: CT IMAGING | Facility: CLINIC | Age: 37
End: 2022-09-30
Attending: OBSTETRICS & GYNECOLOGY
Payer: COMMERCIAL

## 2022-09-30 ENCOUNTER — NURSE TRIAGE (OUTPATIENT)
Dept: NURSING | Facility: CLINIC | Age: 37
End: 2022-09-30

## 2022-09-30 ENCOUNTER — PRENATAL OFFICE VISIT (OUTPATIENT)
Dept: OBGYN | Facility: CLINIC | Age: 37
End: 2022-09-30

## 2022-09-30 VITALS
RESPIRATION RATE: 18 BRPM | SYSTOLIC BLOOD PRESSURE: 110 MMHG | TEMPERATURE: 98 F | HEART RATE: 85 BPM | DIASTOLIC BLOOD PRESSURE: 62 MMHG

## 2022-09-30 VITALS
BODY MASS INDEX: 29.01 KG/M2 | HEART RATE: 90 BPM | WEIGHT: 169 LBS | SYSTOLIC BLOOD PRESSURE: 146 MMHG | DIASTOLIC BLOOD PRESSURE: 92 MMHG

## 2022-09-30 DIAGNOSIS — R51.9 PREGNANCY HEADACHE IN THIRD TRIMESTER: Primary | ICD-10-CM

## 2022-09-30 DIAGNOSIS — O26.893 PREGNANCY HEADACHE IN THIRD TRIMESTER: Primary | ICD-10-CM

## 2022-09-30 LAB
ABO/RH(D): NORMAL
ALT SERPL W P-5'-P-CCNC: 17 U/L (ref 0–50)
ANTIBODY SCREEN: NEGATIVE
AST SERPL W P-5'-P-CCNC: 12 U/L (ref 0–45)
CREAT SERPL-MCNC: 0.4 MG/DL (ref 0.52–1.04)
CREAT UR-MCNC: 57 MG/DL
ERYTHROCYTE [DISTWIDTH] IN BLOOD BY AUTOMATED COUNT: 12.9 % (ref 10–15)
GFR SERPL CREATININE-BSD FRML MDRD: >90 ML/MIN/1.73M2
HCT VFR BLD AUTO: 33.6 % (ref 35–47)
HGB BLD-MCNC: 11.2 G/DL (ref 11.7–15.7)
MCH RBC QN AUTO: 29.7 PG (ref 26.5–33)
MCHC RBC AUTO-ENTMCNC: 33.3 G/DL (ref 31.5–36.5)
MCV RBC AUTO: 89 FL (ref 78–100)
PLATELET # BLD AUTO: 245 10E3/UL (ref 150–450)
PROT UR-MCNC: 0.15 G/L
PROT/CREAT 24H UR: 0.26 G/G CR (ref 0–0.2)
RBC # BLD AUTO: 3.77 10E6/UL (ref 3.8–5.2)
SPECIMEN EXPIRATION DATE: NORMAL
WBC # BLD AUTO: 12.8 10E3/UL (ref 4–11)

## 2022-09-30 PROCEDURE — 250N000013 HC RX MED GY IP 250 OP 250 PS 637: Performed by: STUDENT IN AN ORGANIZED HEALTH CARE EDUCATION/TRAINING PROGRAM

## 2022-09-30 PROCEDURE — 250N000011 HC RX IP 250 OP 636: Performed by: OBSTETRICS & GYNECOLOGY

## 2022-09-30 PROCEDURE — 85027 COMPLETE CBC AUTOMATED: CPT

## 2022-09-30 PROCEDURE — 96376 TX/PRO/DX INJ SAME DRUG ADON: CPT

## 2022-09-30 PROCEDURE — 86901 BLOOD TYPING SEROLOGIC RH(D): CPT

## 2022-09-30 PROCEDURE — 99207 PR PRENATAL VISIT: CPT | Performed by: OBSTETRICS & GYNECOLOGY

## 2022-09-30 PROCEDURE — 70450 CT HEAD/BRAIN W/O DYE: CPT

## 2022-09-30 PROCEDURE — 250N000011 HC RX IP 250 OP 636

## 2022-09-30 PROCEDURE — G0463 HOSPITAL OUTPT CLINIC VISIT: HCPCS | Mod: 25

## 2022-09-30 PROCEDURE — G0463 HOSPITAL OUTPT CLINIC VISIT: HCPCS

## 2022-09-30 PROCEDURE — 96365 THER/PROPH/DIAG IV INF INIT: CPT

## 2022-09-30 PROCEDURE — 84450 TRANSFERASE (AST) (SGOT): CPT

## 2022-09-30 PROCEDURE — 84460 ALANINE AMINO (ALT) (SGPT): CPT

## 2022-09-30 PROCEDURE — 96375 TX/PRO/DX INJ NEW DRUG ADDON: CPT

## 2022-09-30 PROCEDURE — 82565 ASSAY OF CREATININE: CPT

## 2022-09-30 PROCEDURE — 86850 RBC ANTIBODY SCREEN: CPT

## 2022-09-30 PROCEDURE — 99213 OFFICE O/P EST LOW 20 MIN: CPT | Mod: GC | Performed by: OBSTETRICS & GYNECOLOGY

## 2022-09-30 PROCEDURE — 70450 CT HEAD/BRAIN W/O DYE: CPT | Mod: 26 | Performed by: RADIOLOGY

## 2022-09-30 PROCEDURE — 96361 HYDRATE IV INFUSION ADD-ON: CPT

## 2022-09-30 PROCEDURE — 250N000013 HC RX MED GY IP 250 OP 250 PS 637: Performed by: OBSTETRICS & GYNECOLOGY

## 2022-09-30 PROCEDURE — 258N000003 HC RX IP 258 OP 636

## 2022-09-30 PROCEDURE — 96360 HYDRATION IV INFUSION INIT: CPT

## 2022-09-30 PROCEDURE — 84156 ASSAY OF PROTEIN URINE: CPT

## 2022-09-30 PROCEDURE — 36415 COLL VENOUS BLD VENIPUNCTURE: CPT

## 2022-09-30 RX ORDER — BUTALBITAL, ACETAMINOPHEN AND CAFFEINE 50; 325; 40 MG/1; MG/1; MG/1
1 TABLET ORAL ONCE
Status: COMPLETED | OUTPATIENT
Start: 2022-09-30 | End: 2022-09-30

## 2022-09-30 RX ORDER — ACETAMINOPHEN 325 MG/1
650 TABLET ORAL ONCE
Status: COMPLETED | OUTPATIENT
Start: 2022-09-30 | End: 2022-09-30

## 2022-09-30 RX ORDER — METOCLOPRAMIDE HYDROCHLORIDE 5 MG/ML
10 INJECTION INTRAMUSCULAR; INTRAVENOUS EVERY 6 HOURS
Status: DISCONTINUED | OUTPATIENT
Start: 2022-09-30 | End: 2022-09-30 | Stop reason: HOSPADM

## 2022-09-30 RX ORDER — DIPHENHYDRAMINE HYDROCHLORIDE 50 MG/ML
25 INJECTION INTRAMUSCULAR; INTRAVENOUS EVERY 6 HOURS PRN
Status: DISCONTINUED | OUTPATIENT
Start: 2022-09-30 | End: 2022-09-30 | Stop reason: HOSPADM

## 2022-09-30 RX ORDER — SODIUM CHLORIDE, SODIUM LACTATE, POTASSIUM CHLORIDE, CALCIUM CHLORIDE 600; 310; 30; 20 MG/100ML; MG/100ML; MG/100ML; MG/100ML
INJECTION, SOLUTION INTRAVENOUS
Status: COMPLETED
Start: 2022-09-30 | End: 2022-09-30

## 2022-09-30 RX ORDER — LIDOCAINE 40 MG/G
CREAM TOPICAL
Status: DISCONTINUED | OUTPATIENT
Start: 2022-09-30 | End: 2022-09-30 | Stop reason: HOSPADM

## 2022-09-30 RX ORDER — PSEUDOEPHEDRINE HCL 30 MG
30 TABLET ORAL EVERY 4 HOURS PRN
Status: DISCONTINUED | OUTPATIENT
Start: 2022-09-30 | End: 2022-09-30 | Stop reason: HOSPADM

## 2022-09-30 RX ORDER — DIPHENHYDRAMINE HCL 25 MG
25 CAPSULE ORAL EVERY 6 HOURS PRN
Status: DISCONTINUED | OUTPATIENT
Start: 2022-09-30 | End: 2022-09-30 | Stop reason: HOSPADM

## 2022-09-30 RX ORDER — CALCIUM CARBONATE 500 MG/1
1000 TABLET, CHEWABLE ORAL ONCE
Status: COMPLETED | OUTPATIENT
Start: 2022-09-30 | End: 2022-09-30

## 2022-09-30 RX ORDER — MAGNESIUM SULFATE HEPTAHYDRATE 40 MG/ML
2 INJECTION, SOLUTION INTRAVENOUS ONCE
Status: COMPLETED | OUTPATIENT
Start: 2022-09-30 | End: 2022-09-30

## 2022-09-30 RX ADMIN — ANTACID TABLETS 1000 MG: 500 TABLET, CHEWABLE ORAL at 19:56

## 2022-09-30 RX ADMIN — SODIUM CHLORIDE, POTASSIUM CHLORIDE, SODIUM LACTATE AND CALCIUM CHLORIDE 500 ML: 600; 310; 30; 20 INJECTION, SOLUTION INTRAVENOUS at 13:16

## 2022-09-30 RX ADMIN — BUTALBITAL, ACETAMINOPHEN, AND CAFFEINE 1 TABLET: 50; 325; 40 TABLET ORAL at 14:24

## 2022-09-30 RX ADMIN — METOCLOPRAMIDE HYDROCHLORIDE 10 MG: 5 INJECTION INTRAMUSCULAR; INTRAVENOUS at 19:09

## 2022-09-30 RX ADMIN — PSEUDOEPHEDRINE HCL 30 MG: 30 TABLET, FILM COATED ORAL at 16:35

## 2022-09-30 RX ADMIN — DIPHENHYDRAMINE HYDROCHLORIDE 25 MG: 50 INJECTION, SOLUTION INTRAMUSCULAR; INTRAVENOUS at 19:18

## 2022-09-30 RX ADMIN — MAGNESIUM SULFATE 2 G: 2 INJECTION INTRAVENOUS at 18:20

## 2022-09-30 RX ADMIN — DIPHENHYDRAMINE HYDROCHLORIDE 25 MG: 50 INJECTION, SOLUTION INTRAMUSCULAR; INTRAVENOUS at 13:41

## 2022-09-30 RX ADMIN — METOCLOPRAMIDE HYDROCHLORIDE 10 MG: 5 INJECTION INTRAMUSCULAR; INTRAVENOUS at 13:40

## 2022-09-30 RX ADMIN — ACETAMINOPHEN 650 MG: 325 TABLET, FILM COATED ORAL at 14:27

## 2022-09-30 ASSESSMENT — ACTIVITIES OF DAILY LIVING (ADL)
ADLS_ACUITY_SCORE: 18
ADLS_ACUITY_SCORE: 35

## 2022-09-30 NOTE — TELEPHONE ENCOUNTER
Meagan calles  @32w3d  Called nurse triage to report headache unresolved with Tylenol  Has also tried hydration, rest, ice pack   Headache began yesterday morning, about 0300 and has persisted  Feels throbbing pressure behind her eye        History of gestational hypertension in previous pregnancy    Added patient to KD schedule for BP check and in-person eval

## 2022-09-30 NOTE — PLAN OF CARE
Data: Patient here to rule out Preeclampsia. Blood pressures within parameters. Signs and symptoms of pre-eclampsia absent. Fetal assessment Appropriate for Gestational Age.Patient complains of headache mostly right face.  Interventions: Monitor blood pressures and indicators of pre-eclampsia every 4 hours. Continue uterine/fetal assessment . Activity level Regular activity. Preventive measures include Medications, Positioning, and Frequent voiding. Encourage active range of motion and frequent position changes.  Plan: Continue expectant management. Observe for and notify care provider of indicators of worsening pre-eclampsia or signs of fetal/maternal compromise.

## 2022-09-30 NOTE — PROGRESS NOTES
"[facility]  Labor & Delivery Triage Note    HPI: Meagan Mcgowan is a 37 year old  at 32w3d by 13w3d US here for evaluation and management of headache.     Patient reports having an onset of a severe headache early this morning. Has experienced occasional headaches in early pregnancy, but feels like this headache is \"the worst one that I have had\". Did initially think that headache/symptoms was due to a sinus infection as she has notable seasonal fall allergies, but as it has persisted now things it may be something worse. She tried taking tylenol, sleeping, and eating some food (last ate at 0900) but none of this helped to resolve her headache.  Finding no resolution she called the nursing line and was advised to present to triage for further evaluation.     Currently the patient continues to report a severe headache, most prominent behind her left eye. She also reports feeling onset of dizziness when she turns her head to either side. Light and loud noises seem to exacerbate her headache. She denies any radiation of pain, neck pain, blurry vision, double vision, scotoma, RUQ pain or new onset swelling in recent days. Prior to today she felt well. Does note some increased stress with familial issues going on at home. She has been feeling good fetal movement. Some occasional, irregular tightenings but no painful contractions, LOF, or  Vaginal bleeding.      Pregnancy notable for:  - Hx CS x1  - Hx gHTN  - Short interval pregnancy  - AMA    Lab Results   Component Value Date    ABO B 2021    RH Pos 2021    AS Negative 2022    HEPBANG Nonreactive 2022    HGB 11.2 (L) 2022       GBS Status: unknown, not yet collected in this pregnancy      OBHX:  OB History    Para Term  AB Living   2 1 1 0 0 1   SAB IAB Ectopic Multiple Live Births   0 0 0 0 1      # Outcome Date GA Lbr Inder/2nd Weight Sex Delivery Anes PTL Lv   2 Current "            1 Term 21 39w5d  3.09 kg (6 lb 13 oz) M CS-LTranv  N JOURDAN      Complications: Fetal Intolerance      Name: Khai      Apgar1: 7  Apgar5: 7      Obstetric Comments   Khai       Medical Hx:  Past Medical History:   Diagnosis Date     Abnormal Pap smear of cervix      Anemia      Mild asthma        Surgical Hx:  Past Surgical History:   Procedure Laterality Date      SECTION N/A 2021    Procedure:  SECTION;  Surgeon: Julissa Garcia MD;  Location: [facility] L[facility]     wisdom teeth         Medications:  Current Outpatient Medications   Medication Instructions     aspirin (ASA) 81 mg, Oral, DAILY     Cetirizine HCl (ZYRTEC PO) Oral     cholecalciferol (VITAMIN D3) 25 mcg (1000 units) capsule 3 capsules, Oral, DAILY     Prenatal Vit-Fe Fumarate-FA (PRENATAL MULTIVITAMIN W/IRON) 27-0.8 MG tablet 1 tablet, Oral, DAILY     Probiotic Product (PROBIOTIC-10) CHEW Oral       Allergies:  No Known Allergies    FMHx:    Family History   Problem Relation Age of Onset     Asthma Mother      Tongue cancer Father      Hypertension Father      Diabetes Paternal Grandmother      Heart Failure Paternal Grandmother      Childhood Heart Disease Brother        Social Hx:  Social History     Tobacco Use     Smoking status: Never Smoker     Smokeless tobacco: Never Used   Substance Use Topics     Alcohol use: Not Currently     Drug use: Never         ROS: 10-point ROS negative except as in HPI.    Physical Exam:  Vitals:    22 1207 22 1222 22 1237 22 1253   BP: (!) 140/80 127/83 125/79 120/83   Temp: 97.6  F (36.4  C)      TempSrc: Oral        GEN: dim room, laying in bed, mild distress, partner at bedside  CV: Regular rate, and rhythm. No audible murmurs.   PULM: On room air, no increased work of breathing. Clear to ascultation bilaterally  ABD: soft, gravid, non-tender, non-distended. No appreciable RUQ pain.   EXT: trace edema, non- tender to palpation    NST:  FHT: baseline 130,  "moderate variability, + accels, no decels  TOCO: 0 ctx in ten minutes    A/P: 37 year old  at 32w3d by 13w3d US, here for management of headache. Pregnancy notable for Hx CS x1, Hx gHTN, Short interval pregnancy, AMA. Differential includes Differential includes tension HA, migraines, dehydration, musculoskeletal, pre-eclampsia, medication effect, idiopathic intracranial hypertension, intracranial pathology.      # Headache  # Pre-eclampsia rule out  - patient in dark room, cool wash cloth.   - IV benadryl, Reglan, Tylenol, Fioricet given to patient in addition to IVF. Reported after several hours that this did not help to resolve headache. Tried pseudofed as well, this did not help.  - Given complaints of \"worst headache\" CT head was obtained for evaluation. Negative for any acute pathology  - HELLP labs obtained and wnl. UPC collected, UPC 0.26. One MR blood pressure while inpatient and one in clinic. On review of BP not yet meeting criteria for gHTN diagnosis in this pregnancy.    # Fetal Well-Being  - Continuous Monitoring while in patient   - FHT: Category I    # PNC  - Rh positive, GBS has not been collected in this pregnancy  - GCT: wnl  - Immunizations: s/p Tdap and flu (22)    Dispo: Patient care signed out to oncoming night team who will further manage this patient and arrange discharge plan.     Patient discussed with Dr. Rosalie Nino DO, MS  Obstetrics, Gynecology & Women's Health   Resident, PGY-2  2022 6:48 PM      Addendum:    Received signout on this patient from day team. Briefly,  at 32w3d coming in for headache and preeclampsia evaluation. HA initially unresolved with reglan/benadryl and then fioricet, IVF bolus. Due to worsening HA actually, CT head obtained and negative for acute intracranial pathology. 2g IV magnesium load given with complete resolution of headache over the next 1-2 hours. Patient desires discharge home to get good night of sleep. No " other symptoms of preE at this time. Did have elevated some elevated (mild range) BP <4h apart, so will plan for short term follow up in clinic early next week for BP check and evaluation. Discussed return precautions. Plan discussed with Dr. Phil Kwan MD  ObGyn, PGY-3  09/30/2022 9:06 PM    OB/GYN Attending Attestation   Patient seen and examined with resident physician and agree with above assessment and plan. Message sent to clinic to schedule follow up prenatal visit early next week and discussed with patient that she should call if headache returns or any other pre-eclampsia symptoms or other OB concerns.     Keren Villarreal MD

## 2022-09-30 NOTE — TELEPHONE ENCOUNTER
OB Triage Call      Is patient's OB/Midwife with the [facility] or [facility]? [facility]- Proceed with triage     Reason for call: Headache    Assessment: Patient calling, states she has had a headache since yesterday. She states that Tylenol did not help. States that she is also nauseated. She is unable to check her blood pressure at home. Denies weakness and numbness. Denies swelling of her face, hands and feet. Denies cramping or any labor symptoms.    Plan: Second level triage    Patient plans to deliver [place] [place] [facility] [facility]    Patient's primary OB Provider is Grisel Soliz .      Per protocol recommendations Patient to follow home care recommendations.       Is patient's delivering hospital on divert? No      32w3d    Estimated Date of Delivery: 2022        OB History    Para Term  AB Living   2 1 1 0 0 1   SAB IAB Ectopic Multiple Live Births   0 0 0 0 1      # Outcome Date GA Lbr Inder/2nd Weight Sex Delivery Anes PTL Lv   2 Current            1 Term 21 39w5d  3.09 kg (6 lb 13 oz) M CS-LTranv  N JOURDAN      Complications: Fetal Intolerance      Name: Khai      Apgar1: 7  Apgar5: 7      Obstetric Comments   Khai       No results found for: GBS       DEMETRIO MUHAMMAD RN 22 7:35 AM  [facility] Nurse Advisor    Reason for Disposition    Pregnant 20 or more weeks and SEVERE headache (e.g., excruciating) that is not improved after 2 hours of pain medicine    Additional Information    Negative: Difficult to awaken or acting confused (e.g., disoriented, slurred speech)    Negative: Weakness of the face, arm or leg on one side of the body and new-onset    Negative: Numbness of the face, arm or leg on one side of the body and new-onset    Negative: Loss of speech or garbled speech and new-onset    Negative: Passed out (i.e., fainted, collapsed and was not responding)    Negative: Sounds like a life-threatening emergency to the triager    Negative: Followed a head  injury within last 3 days    Negative: Traumatic Brain Injury (TBI) is suspected    Negative: Sinus pain of forehead and yellow or green nasal discharge    Negative: Unable to walk without falling    Negative: Stiff neck (can't touch chin to chest)    Negative: Possibility of carbon monoxide exposure    Negative: SEVERE headache (e.g., excruciating) and having contractions or other symptoms of labor    Negative: Pregnant 20 or more weeks with Systolic BP >= 140 OR Diastolic BP >= 90    Protocols used: PREGNANCY - HEADACHE-A-OH

## 2022-10-01 NOTE — PROGRESS NOTES
Data: [facility] at 1200.   Reason for maternal/fetal assessment per patient is Rule Out Pre-eclampsia  . Patient is a . Prenatal record reviewed.      OB History    Para Term  AB Living   2 1 1 0 0 1   SAB IAB Ectopic Multiple Live Births   0 0 0 0 1      # Outcome Date GA Lbr Inder/2nd Weight Sex Delivery Anes PTL Lv   2 Current            1 Term 21 39w5d  3.09 kg (6 lb 13 oz) M CS-LTranv  N JOURDAN      Complications: Fetal Intolerance      Name: Khai      Apgar1: 7  Apgar5: 7      Obstetric Comments   Khai      Medical History:   Past Medical History:   Diagnosis Date     Abnormal Pap smear of cervix      Anemia      Mild asthma    . Gestational Age 32w3d. VSS. Cervix: not examined.  Fetal movement present. Patient denies cramping, backache, vaginal discharge, pelvic pressure, UTI symptoms, GI problems, bloody show, vaginal bleeding, edema, visual disturbances, epigastric or URQ pain, abdominal pain, rupture of membranes. C/o intractable headache.   Action: Verbal consent for EFM. Triage assessment completed. EFM applied for fetal well-being. Uterine assessment done via TOCO. Fetal assessment: Presumed adequate fetal oxygenation documented (see flow record). Patient education pamphlets given on fetal movement counts and when to call provider. Patient instructed to report change in fetal movement, vaginal leaking of fluid or bleeding, abdominal pain, or any concerns related to the pregnancy to her nurse/physician.   Response: Pt stating pain has improved. Able to tolerate movement and would like to discharge to home. Dr Palacios informed of pt improvement. Plan per provider is discharge pt to home. Patient verbalized understanding of education and verbalized agreement with plan. Discharged ambulatory at 2108.

## 2022-10-01 NOTE — DISCHARGE INSTRUCTIONS
Discharge Instruction for Undelivered Patients      You were seen for:  Pre-eclampsia Assessment  We Consulted: Dr Villarreal and Dr Palacios  You had (Test or Medicine): Fetal Monitoring, BP work up, Labs, IVF     Diet:   Drink 8 to 12 glasses of liquids (milk, juice, water) every day.     Activity:  Call your doctor or nurse midwife if your baby is moving less than usual.     Call your provider if you notice:  Swelling in your face or increased swelling in your hands or legs.  Headaches that are not relieved by Tylenol (acetaminophen).  Changes in your vision (blurring: seeing spots or stars.)  Nausea (sick to your stomach) and vomiting (throwing up).   Weight gain of 5 pounds or more per week.  Heartburn that doesn't go away.  Signs of bladder infection: pain when you urinate (use the toilet), need to go more often and more urgently.  The bag of myers (rupture of membranes) breaks, or you notice leaking in your underwear.  Bright red blood in your underwear.  Abdominal (lower belly) or stomach pain.  Second (plus) baby: Contractions (tightening) less than 10 minutes apart and getting stronger.  *If less than 34 weeks: Contractions (tightening) more than 6 times in one hour.  Increase or change in vaginal discharge (note the color and amount)    Follow-up:  As scheduled in the clinic

## 2022-10-01 NOTE — PROGRESS NOTES
Return OB visit    Subjective:  Patient presents today for add on prenatal visit due to severe headache that started yesterday and did not resolve with sleep, PO tylenol or hydration. She denies swelling, visual changes, chest pain or SOB but does have some nausea. She has a history of gHTN after her last pregnancy in the post-partum period but no hx of cHTN. She denies any OB concerns and reports +FM.        Objective:  BP (!) 146/92   Pulse 90   Wt 76.7 kg (169 lb)   LMP 03/10/2022 (Approximate)   BMI 29.01 kg/m     See OB flow sheet    Assessment and Plan    Meaganisabel Mcgowan is a 37 year old  at 32w4d here for due to headache   -I discussed with Meagan that I am concerned about possible pre-eclampsia and would recommend further evaluation and treatment of her headache on L&D.   -Charge nurse and all call OB notified of POC     Dispo: patient will present to L&D for further evaluation     Keren Villarreal MD

## 2022-10-01 NOTE — PROVIDER NOTIFICATION
Suzanne at bedside. Pt feeling improved. Would like to discharge to home. Plan to place discharge order.

## 2022-10-04 ENCOUNTER — OFFICE VISIT (OUTPATIENT)
Dept: OBGYN | Facility: CLINIC | Age: 37
End: 2022-10-04
Payer: COMMERCIAL

## 2022-10-04 VITALS
TEMPERATURE: 98 F | BODY MASS INDEX: 29.35 KG/M2 | DIASTOLIC BLOOD PRESSURE: 84 MMHG | HEART RATE: 103 BPM | WEIGHT: 171 LBS | SYSTOLIC BLOOD PRESSURE: 134 MMHG

## 2022-10-04 DIAGNOSIS — R51.9 PREGNANCY HEADACHE IN THIRD TRIMESTER: Primary | ICD-10-CM

## 2022-10-04 DIAGNOSIS — O26.893 PREGNANCY HEADACHE IN THIRD TRIMESTER: Primary | ICD-10-CM

## 2022-10-04 PROCEDURE — 99207 PR PRENATAL VISIT: CPT | Performed by: OBSTETRICS & GYNECOLOGY

## 2022-10-04 RX ORDER — HYDROXYZINE PAMOATE 50 MG/1
50-100 CAPSULE ORAL
Qty: 30 CAPSULE | Refills: 1 | Status: SHIPPED | OUTPATIENT
Start: 2022-10-04

## 2022-10-04 NOTE — PROGRESS NOTES
Doing well.   Had to go to L&D for headache last week. Ruled out for pre-eclampsia.   Reglan and benadryl didn't help. Fioricet did help.   Took only tylenol and caffeine over the weekend.   Still having pressure headache, no more throbbing.   Took fioricet once yesterday. Headache improving but not gone.   Had bad nausea Friday, but still has low appetite.     Spouse's step mom just passed away after fast cardona with cancer.  is this coming weekend in [place].   Advised Vistaril for sleep. Fioricet as sparing as possible. May try essential oils as well.   Reviewed travel precautions.   Checked home BP cuff against our clinic cuff and consistent.   RTC 2 weeks.      Universal Safety Interventions

## 2022-10-05 ENCOUNTER — HOSPITAL ENCOUNTER (OUTPATIENT)
Dept: ULTRASOUND IMAGING | Facility: CLINIC | Age: 37
Discharge: HOME | End: 2022-10-05
Attending: OBSTETRICS & GYNECOLOGY
Payer: COMMERCIAL

## 2022-10-05 ENCOUNTER — OFFICE VISIT (OUTPATIENT)
Dept: MATERNAL FETAL MEDICINE | Facility: CLINIC | Age: 37
End: 2022-10-05
Attending: OBSTETRICS & GYNECOLOGY
Payer: COMMERCIAL

## 2022-10-05 DIAGNOSIS — O98.513 COVID-19 AFFECTING PREGNANCY IN THIRD TRIMESTER: Primary | ICD-10-CM

## 2022-10-05 DIAGNOSIS — U07.1 COVID-19 AFFECTING PREGNANCY IN THIRD TRIMESTER: Primary | ICD-10-CM

## 2022-10-05 DIAGNOSIS — O98.512 COVID-19 AFFECTING PREGNANCY IN SECOND TRIMESTER: ICD-10-CM

## 2022-10-05 DIAGNOSIS — U07.1 COVID-19 AFFECTING PREGNANCY IN SECOND TRIMESTER: ICD-10-CM

## 2022-10-05 PROCEDURE — 76816 OB US FOLLOW-UP PER FETUS: CPT | Mod: 26 | Performed by: OBSTETRICS & GYNECOLOGY

## 2022-10-05 PROCEDURE — 76816 OB US FOLLOW-UP PER FETUS: CPT

## 2022-10-05 NOTE — PROGRESS NOTES
"Please see \"Imaging\" tab under \"Chart Review\" for details of today's US at the [facility].    Aguila Marin MD  Maternal-Fetal Medicine      "

## 2022-10-07 ENCOUNTER — VIRTUAL VISIT (OUTPATIENT)
Dept: OBGYN | Facility: CLINIC | Age: 37
End: 2022-10-07
Payer: COMMERCIAL

## 2022-10-07 DIAGNOSIS — Z53.9 ERRONEOUS ENCOUNTER--DISREGARD: Primary | ICD-10-CM

## 2022-10-10 NOTE — TELEPHONE ENCOUNTER
Patient calling back to schedule c/s, states that she and AO had discussed TU 11/15/22, but AO is scheduled to be off.     Routing to AO: are you OK to come in on TU 11/15/22 at 12:30PM for this c/s, or should we look at different date you are scheduled to work?       Shayy Rdz/her/hers  [facility] Scheduler

## 2022-10-17 ENCOUNTER — TELEPHONE (OUTPATIENT)
Dept: OBGYN | Facility: CLINIC | Age: 37
End: 2022-10-17

## 2022-10-17 NOTE — TELEPHONE ENCOUNTER
Type of surgery: ob  Location of surgery: [facility] [place] [facility]  Date and time of surgery: 11/15/22 12:30PM  Surgeon: Soraya  Pre-Op Appt Date: surgeon  Post-Op Appt Date: 12/29/22 [place]   Packet sent out: will  at next [facility]  Pre-cert/Authorization completed:  Not Applicable  Date: 10/17/22     HEIDI Lucas  She/her/hers  [facility] Scheduler

## 2022-10-18 ENCOUNTER — PRENATAL OFFICE VISIT (OUTPATIENT)
Dept: OBGYN | Facility: CLINIC | Age: 37
End: 2022-10-18
Payer: COMMERCIAL

## 2022-10-18 VITALS
BODY MASS INDEX: 29.95 KG/M2 | WEIGHT: 174.5 LBS | DIASTOLIC BLOOD PRESSURE: 78 MMHG | HEART RATE: 99 BPM | SYSTOLIC BLOOD PRESSURE: 138 MMHG | OXYGEN SATURATION: 97 %

## 2022-10-18 DIAGNOSIS — O09.523 MULTIGRAVIDA OF ADVANCED MATERNAL AGE IN THIRD TRIMESTER: ICD-10-CM

## 2022-10-18 DIAGNOSIS — R51.9 PREGNANCY HEADACHE IN THIRD TRIMESTER: ICD-10-CM

## 2022-10-18 DIAGNOSIS — Z23 HIGH PRIORITY FOR 2019-NCOV VACCINE: Primary | ICD-10-CM

## 2022-10-18 DIAGNOSIS — O26.893 PREGNANCY HEADACHE IN THIRD TRIMESTER: ICD-10-CM

## 2022-10-18 PROCEDURE — 91312 COVID-19,PF,PFIZER BOOSTER BIVALENT: CPT | Performed by: OBSTETRICS & GYNECOLOGY

## 2022-10-18 PROCEDURE — 0124A COVID-19,PF,PFIZER BOOSTER BIVALENT: CPT | Performed by: OBSTETRICS & GYNECOLOGY

## 2022-10-18 PROCEDURE — 99207 PR PRENATAL VISIT: CPT | Performed by: OBSTETRICS & GYNECOLOGY

## 2022-10-18 PROCEDURE — 87653 STREP B DNA AMP PROBE: CPT | Performed by: OBSTETRICS & GYNECOLOGY

## 2022-10-18 NOTE — PROGRESS NOTES
Doing well.   Headaches are much reduced.   Good fetal movement.   Just uncomfortable from baby getting bigger.   GBS today.   RTC weekly

## 2022-10-19 LAB — GP B STREP DNA SPEC QL NAA+PROBE: NEGATIVE

## 2022-10-27 ENCOUNTER — PRENATAL OFFICE VISIT (OUTPATIENT)
Dept: OBGYN | Facility: CLINIC | Age: 37
End: 2022-10-27
Payer: COMMERCIAL

## 2022-10-27 VITALS
DIASTOLIC BLOOD PRESSURE: 76 MMHG | WEIGHT: 174 LBS | SYSTOLIC BLOOD PRESSURE: 128 MMHG | TEMPERATURE: 98 F | HEART RATE: 93 BPM | BODY MASS INDEX: 29.87 KG/M2

## 2022-10-27 DIAGNOSIS — O09.523 MULTIGRAVIDA OF ADVANCED MATERNAL AGE IN THIRD TRIMESTER: Primary | ICD-10-CM

## 2022-10-27 PROCEDURE — 99207 PR PRENATAL VISIT: CPT | Performed by: OBSTETRICS & GYNECOLOGY

## 2022-10-27 RX ORDER — ACETAMINOPHEN 325 MG/1
650 TABLET ORAL EVERY 6 HOURS PRN
COMMUNITY
Start: 2022-10-27 | End: 2022-11-26

## 2022-10-27 RX ORDER — IBUPROFEN 200 MG
600 TABLET ORAL EVERY 6 HOURS PRN
COMMUNITY
Start: 2022-10-27 | End: 2022-11-26

## 2022-10-27 RX ORDER — AMOXICILLIN 250 MG
1 CAPSULE ORAL DAILY
COMMUNITY
Start: 2022-10-27 | End: 2022-11-26

## 2022-10-27 NOTE — PROGRESS NOTES
GBS: Negative  Hemoglobin   Date Value Ref Range Status   2022 11.2 (L) 11.7 - 15.7 g/dL Final   2021 11.5 (L) 11.7 - 15.7 g/dL Final   ]    Breast pump rx: script done   Labor orders: signed and held, repeat CS  Birth plan: repeat CS  Length of stay: discussed   Disability paperwork: completed today  Resident involvement: discussed and agrees.  PP meds OTC    Good fetal movement.   Preparing for scheduled .   RTC weekly

## 2022-11-03 ENCOUNTER — PRENATAL OFFICE VISIT (OUTPATIENT)
Dept: OBGYN | Facility: CLINIC | Age: 37
End: 2022-11-03
Payer: COMMERCIAL

## 2022-11-03 VITALS
DIASTOLIC BLOOD PRESSURE: 79 MMHG | BODY MASS INDEX: 29.7 KG/M2 | WEIGHT: 173 LBS | HEART RATE: 94 BPM | SYSTOLIC BLOOD PRESSURE: 123 MMHG | TEMPERATURE: 97.2 F

## 2022-11-03 DIAGNOSIS — Z34.83 ENCOUNTER FOR SUPERVISION OF OTHER NORMAL PREGNANCY IN THIRD TRIMESTER: Primary | ICD-10-CM

## 2022-11-03 PROCEDURE — 99207 PR PRENATAL VISIT: CPT | Performed by: OBSTETRICS & GYNECOLOGY

## 2022-11-07 ENCOUNTER — TELEPHONE (OUTPATIENT)
Dept: OBGYN | Facility: CLINIC | Age: 37
End: 2022-11-07

## 2022-11-07 NOTE — TELEPHONE ENCOUNTER
Forms received. Filled out and signed. Settle message sent to patient to see what to do with them!

## 2022-11-08 DIAGNOSIS — Z98.891 H/O CESAREAN SECTION: ICD-10-CM

## 2022-11-08 DIAGNOSIS — Z01.818 PRE-OP EXAM: Primary | ICD-10-CM

## 2022-11-10 ENCOUNTER — PRENATAL OFFICE VISIT (OUTPATIENT)
Dept: OBGYN | Facility: CLINIC | Age: 37
End: 2022-11-10
Payer: COMMERCIAL

## 2022-11-10 VITALS
DIASTOLIC BLOOD PRESSURE: 76 MMHG | HEART RATE: 109 BPM | TEMPERATURE: 97.9 F | SYSTOLIC BLOOD PRESSURE: 120 MMHG | BODY MASS INDEX: 29.7 KG/M2 | WEIGHT: 173 LBS

## 2022-11-10 DIAGNOSIS — O09.523 MULTIGRAVIDA OF ADVANCED MATERNAL AGE IN THIRD TRIMESTER: Primary | ICD-10-CM

## 2022-11-10 PROCEDURE — 99207 PR PRENATAL VISIT: CPT | Performed by: OBSTETRICS & GYNECOLOGY

## 2022-11-10 NOTE — PROGRESS NOTES
Doing well.   Good fetal movement.   Just getting normal nervousness about delivery.   CS scheduled for Tuesday. Labs scheduled.

## 2022-11-13 LAB
ABO/RH(D): NORMAL
ANTIBODY SCREEN: NEGATIVE
SPECIMEN EXPIRATION DATE: NORMAL

## 2022-11-14 ENCOUNTER — LAB (OUTPATIENT)
Dept: LAB | Facility: CLINIC | Age: 37
End: 2022-11-14
Payer: COMMERCIAL

## 2022-11-14 ENCOUNTER — ANESTHESIA EVENT (OUTPATIENT)
Dept: OBGYN | Facility: CLINIC | Age: 37
End: 2022-11-14
Payer: COMMERCIAL

## 2022-11-14 DIAGNOSIS — Z01.818 PRE-OP EXAM: ICD-10-CM

## 2022-11-14 DIAGNOSIS — Z98.891 H/O CESAREAN SECTION: ICD-10-CM

## 2022-11-14 LAB
ERYTHROCYTE [DISTWIDTH] IN BLOOD BY AUTOMATED COUNT: 13.2 % (ref 10–15)
HCT VFR BLD AUTO: 37.9 % (ref 35–47)
HGB BLD-MCNC: 12.5 G/DL (ref 11.7–15.7)
MCH RBC QN AUTO: 29.9 PG (ref 26.5–33)
MCHC RBC AUTO-ENTMCNC: 33 G/DL (ref 31.5–36.5)
MCV RBC AUTO: 91 FL (ref 78–100)
PLATELET # BLD AUTO: 269 10E3/UL (ref 150–450)
RBC # BLD AUTO: 4.18 10E6/UL (ref 3.8–5.2)
SARS-COV-2 RNA RESP QL NAA+PROBE: NEGATIVE
T PALLIDUM AB SER QL: NONREACTIVE
WBC # BLD AUTO: 9.8 10E3/UL (ref 4–11)

## 2022-11-14 PROCEDURE — 86850 RBC ANTIBODY SCREEN: CPT

## 2022-11-14 PROCEDURE — 86780 TREPONEMA PALLIDUM: CPT

## 2022-11-14 PROCEDURE — U0003 INFECTIOUS AGENT DETECTION BY NUCLEIC ACID (DNA OR RNA); SEVERE ACUTE RESPIRATORY SYNDROME CORONAVIRUS 2 (SARS-COV-2) (CORONAVIRUS DISEASE [COVID-19]), AMPLIFIED PROBE TECHNIQUE, MAKING USE OF HIGH THROUGHPUT TECHNOLOGIES AS DESCRIBED BY CMS-2020-01-R: HCPCS

## 2022-11-14 PROCEDURE — 86901 BLOOD TYPING SEROLOGIC RH(D): CPT

## 2022-11-14 PROCEDURE — 86900 BLOOD TYPING SEROLOGIC ABO: CPT

## 2022-11-14 PROCEDURE — 85027 COMPLETE CBC AUTOMATED: CPT

## 2022-11-14 PROCEDURE — U0005 INFEC AGEN DETEC AMPLI PROBE: HCPCS

## 2022-11-14 PROCEDURE — 36415 COLL VENOUS BLD VENIPUNCTURE: CPT

## 2022-11-14 RX ORDER — SODIUM CHLORIDE, SODIUM LACTATE, POTASSIUM CHLORIDE, CALCIUM CHLORIDE 600; 310; 30; 20 MG/100ML; MG/100ML; MG/100ML; MG/100ML
INJECTION, SOLUTION INTRAVENOUS CONTINUOUS
Status: CANCELLED | OUTPATIENT
Start: 2022-11-14

## 2022-11-14 RX ORDER — ONDANSETRON 4 MG/1
4 TABLET, ORALLY DISINTEGRATING ORAL EVERY 30 MIN PRN
Status: CANCELLED | OUTPATIENT
Start: 2022-11-14

## 2022-11-14 RX ORDER — FENTANYL CITRATE 50 UG/ML
25 INJECTION, SOLUTION INTRAMUSCULAR; INTRAVENOUS EVERY 5 MIN PRN
Status: CANCELLED | OUTPATIENT
Start: 2022-11-14

## 2022-11-14 RX ORDER — ONDANSETRON 2 MG/ML
4 INJECTION INTRAMUSCULAR; INTRAVENOUS EVERY 30 MIN PRN
Status: CANCELLED | OUTPATIENT
Start: 2022-11-14

## 2022-11-15 ENCOUNTER — ANESTHESIA (OUTPATIENT)
Dept: OBGYN | Facility: CLINIC | Age: 37
End: 2022-11-15
Payer: COMMERCIAL

## 2022-11-15 ENCOUNTER — HOSPITAL ENCOUNTER (INPATIENT)
Facility: CLINIC | Age: 37
LOS: 2 days | Discharge: HOME HEALTH | End: 2022-11-17
Attending: OBSTETRICS & GYNECOLOGY | Admitting: OBSTETRICS & GYNECOLOGY
Payer: COMMERCIAL

## 2022-11-15 DIAGNOSIS — Z98.891 S/P CESAREAN SECTION: Primary | ICD-10-CM

## 2022-11-15 PROCEDURE — 250N000011 HC RX IP 250 OP 636: Performed by: STUDENT IN AN ORGANIZED HEALTH CARE EDUCATION/TRAINING PROGRAM

## 2022-11-15 PROCEDURE — 250N000009 HC RX 250

## 2022-11-15 PROCEDURE — 272N000001 HC OR GENERAL SUPPLY STERILE: Performed by: OBSTETRICS & GYNECOLOGY

## 2022-11-15 PROCEDURE — 370N000017 HC ANESTHESIA TECHNICAL FEE, PER MIN: Performed by: OBSTETRICS & GYNECOLOGY

## 2022-11-15 PROCEDURE — 59510 CESAREAN DELIVERY: CPT | Mod: GC | Performed by: OBSTETRICS & GYNECOLOGY

## 2022-11-15 PROCEDURE — 120N000002 HC R&B MED SURG/OB UMMC

## 2022-11-15 PROCEDURE — 258N000003 HC RX IP 258 OP 636: Performed by: STUDENT IN AN ORGANIZED HEALTH CARE EDUCATION/TRAINING PROGRAM

## 2022-11-15 PROCEDURE — 999N000141 HC STATISTIC PRE-PROCEDURE NURSING ASSESSMENT: Performed by: OBSTETRICS & GYNECOLOGY

## 2022-11-15 PROCEDURE — 250N000011 HC RX IP 250 OP 636

## 2022-11-15 PROCEDURE — 250N000013 HC RX MED GY IP 250 OP 250 PS 637: Performed by: STUDENT IN AN ORGANIZED HEALTH CARE EDUCATION/TRAINING PROGRAM

## 2022-11-15 PROCEDURE — 250N000013 HC RX MED GY IP 250 OP 250 PS 637

## 2022-11-15 PROCEDURE — C9290 INJ, BUPIVACAINE LIPOSOME: HCPCS | Performed by: STUDENT IN AN ORGANIZED HEALTH CARE EDUCATION/TRAINING PROGRAM

## 2022-11-15 PROCEDURE — 271N000001 HC OR GENERAL SUPPLY NON-STERILE: Performed by: OBSTETRICS & GYNECOLOGY

## 2022-11-15 PROCEDURE — 250N000011 HC RX IP 250 OP 636: Performed by: OBSTETRICS & GYNECOLOGY

## 2022-11-15 PROCEDURE — 360N000076 HC SURGERY LEVEL 3, PER MIN: Performed by: OBSTETRICS & GYNECOLOGY

## 2022-11-15 PROCEDURE — 258N000003 HC RX IP 258 OP 636

## 2022-11-15 PROCEDURE — 710N000010 HC RECOVERY PHASE 1, LEVEL 2, PER MIN: Performed by: OBSTETRICS & GYNECOLOGY

## 2022-11-15 RX ORDER — KETOROLAC TROMETHAMINE 30 MG/ML
INJECTION, SOLUTION INTRAMUSCULAR; INTRAVENOUS PRN
Status: DISCONTINUED | OUTPATIENT
Start: 2022-11-15 | End: 2022-11-15

## 2022-11-15 RX ORDER — SODIUM CHLORIDE, SODIUM LACTATE, POTASSIUM CHLORIDE, CALCIUM CHLORIDE 600; 310; 30; 20 MG/100ML; MG/100ML; MG/100ML; MG/100ML
INJECTION, SOLUTION INTRAVENOUS CONTINUOUS
Status: DISCONTINUED | OUTPATIENT
Start: 2022-11-15 | End: 2022-11-15 | Stop reason: HOSPADM

## 2022-11-15 RX ORDER — AMOXICILLIN 250 MG
1 CAPSULE ORAL 2 TIMES DAILY
Status: DISCONTINUED | OUTPATIENT
Start: 2022-11-15 | End: 2022-11-17 | Stop reason: HOSPADM

## 2022-11-15 RX ORDER — TRANEXAMIC ACID 10 MG/ML
1 INJECTION, SOLUTION INTRAVENOUS EVERY 30 MIN PRN
Status: DISCONTINUED | OUTPATIENT
Start: 2022-11-15 | End: 2022-11-17 | Stop reason: HOSPADM

## 2022-11-15 RX ORDER — ONDANSETRON 2 MG/ML
INJECTION INTRAMUSCULAR; INTRAVENOUS PRN
Status: DISCONTINUED | OUTPATIENT
Start: 2022-11-15 | End: 2022-11-15

## 2022-11-15 RX ORDER — CEFAZOLIN SODIUM/WATER 2 G/20 ML
2 SYRINGE (ML) INTRAVENOUS
Status: COMPLETED | OUTPATIENT
Start: 2022-11-15 | End: 2022-11-15

## 2022-11-15 RX ORDER — FENTANYL CITRATE-0.9 % NACL/PF 10 MCG/ML
PLASTIC BAG, INJECTION (ML) INTRAVENOUS CONTINUOUS PRN
Status: DISCONTINUED | OUTPATIENT
Start: 2022-11-15 | End: 2022-11-15

## 2022-11-15 RX ORDER — IBUPROFEN 800 MG/1
800 TABLET, FILM COATED ORAL EVERY 6 HOURS
Status: DISCONTINUED | OUTPATIENT
Start: 2022-11-16 | End: 2022-11-17 | Stop reason: HOSPADM

## 2022-11-15 RX ORDER — BISACODYL 10 MG
10 SUPPOSITORY, RECTAL RECTAL DAILY PRN
Status: DISCONTINUED | OUTPATIENT
Start: 2022-11-17 | End: 2022-11-17 | Stop reason: HOSPADM

## 2022-11-15 RX ORDER — METOCLOPRAMIDE HYDROCHLORIDE 5 MG/ML
10 INJECTION INTRAMUSCULAR; INTRAVENOUS EVERY 6 HOURS PRN
Status: DISCONTINUED | OUTPATIENT
Start: 2022-11-15 | End: 2022-11-17 | Stop reason: HOSPADM

## 2022-11-15 RX ORDER — ONDANSETRON 4 MG/1
4 TABLET, ORALLY DISINTEGRATING ORAL EVERY 6 HOURS PRN
Status: DISCONTINUED | OUTPATIENT
Start: 2022-11-15 | End: 2022-11-17 | Stop reason: HOSPADM

## 2022-11-15 RX ORDER — OXYTOCIN 10 [USP'U]/ML
10 INJECTION, SOLUTION INTRAMUSCULAR; INTRAVENOUS
Status: DISCONTINUED | OUTPATIENT
Start: 2022-11-15 | End: 2022-11-17 | Stop reason: HOSPADM

## 2022-11-15 RX ORDER — NALOXONE HYDROCHLORIDE 0.4 MG/ML
0.4 INJECTION, SOLUTION INTRAMUSCULAR; INTRAVENOUS; SUBCUTANEOUS
Status: DISCONTINUED | OUTPATIENT
Start: 2022-11-15 | End: 2022-11-17 | Stop reason: HOSPADM

## 2022-11-15 RX ORDER — MORPHINE SULFATE 1 MG/ML
INJECTION, SOLUTION EPIDURAL; INTRATHECAL; INTRAVENOUS
Status: COMPLETED | OUTPATIENT
Start: 2022-11-15 | End: 2022-11-15

## 2022-11-15 RX ORDER — OXYTOCIN 10 [USP'U]/ML
10 INJECTION, SOLUTION INTRAMUSCULAR; INTRAVENOUS
Status: DISCONTINUED | OUTPATIENT
Start: 2022-11-15 | End: 2022-11-15 | Stop reason: HOSPADM

## 2022-11-15 RX ORDER — TRANEXAMIC ACID 10 MG/ML
1 INJECTION, SOLUTION INTRAVENOUS EVERY 30 MIN PRN
Status: DISCONTINUED | OUTPATIENT
Start: 2022-11-15 | End: 2022-11-15 | Stop reason: HOSPADM

## 2022-11-15 RX ORDER — PROCHLORPERAZINE MALEATE 10 MG
10 TABLET ORAL EVERY 6 HOURS PRN
Status: DISCONTINUED | OUTPATIENT
Start: 2022-11-15 | End: 2022-11-17 | Stop reason: HOSPADM

## 2022-11-15 RX ORDER — MISOPROSTOL 200 UG/1
800 TABLET ORAL
Status: DISCONTINUED | OUTPATIENT
Start: 2022-11-15 | End: 2022-11-15 | Stop reason: HOSPADM

## 2022-11-15 RX ORDER — MODIFIED LANOLIN
OINTMENT (GRAM) TOPICAL
Status: DISCONTINUED | OUTPATIENT
Start: 2022-11-15 | End: 2022-11-17 | Stop reason: HOSPADM

## 2022-11-15 RX ORDER — LIDOCAINE 40 MG/G
CREAM TOPICAL
Status: DISCONTINUED | OUTPATIENT
Start: 2022-11-15 | End: 2022-11-17 | Stop reason: HOSPADM

## 2022-11-15 RX ORDER — HYDROCORTISONE 25 MG/G
CREAM TOPICAL 3 TIMES DAILY PRN
Status: DISCONTINUED | OUTPATIENT
Start: 2022-11-15 | End: 2022-11-17 | Stop reason: HOSPADM

## 2022-11-15 RX ORDER — METHYLERGONOVINE MALEATE 0.2 MG/ML
200 INJECTION INTRAVENOUS
Status: DISCONTINUED | OUTPATIENT
Start: 2022-11-15 | End: 2022-11-17 | Stop reason: HOSPADM

## 2022-11-15 RX ORDER — OXYTOCIN/0.9 % SODIUM CHLORIDE 30/500 ML
PLASTIC BAG, INJECTION (ML) INTRAVENOUS CONTINUOUS PRN
Status: DISCONTINUED | OUTPATIENT
Start: 2022-11-15 | End: 2022-11-15

## 2022-11-15 RX ORDER — NALOXONE HYDROCHLORIDE 0.4 MG/ML
0.2 INJECTION, SOLUTION INTRAMUSCULAR; INTRAVENOUS; SUBCUTANEOUS
Status: DISCONTINUED | OUTPATIENT
Start: 2022-11-15 | End: 2022-11-17 | Stop reason: HOSPADM

## 2022-11-15 RX ORDER — MISOPROSTOL 200 UG/1
400 TABLET ORAL
Status: DISCONTINUED | OUTPATIENT
Start: 2022-11-15 | End: 2022-11-15 | Stop reason: HOSPADM

## 2022-11-15 RX ORDER — ONDANSETRON 2 MG/ML
4 INJECTION INTRAMUSCULAR; INTRAVENOUS EVERY 6 HOURS PRN
Status: DISCONTINUED | OUTPATIENT
Start: 2022-11-15 | End: 2022-11-17 | Stop reason: HOSPADM

## 2022-11-15 RX ORDER — SODIUM CHLORIDE, SODIUM LACTATE, POTASSIUM CHLORIDE, CALCIUM CHLORIDE 600; 310; 30; 20 MG/100ML; MG/100ML; MG/100ML; MG/100ML
INJECTION, SOLUTION INTRAVENOUS CONTINUOUS PRN
Status: DISCONTINUED | OUTPATIENT
Start: 2022-11-15 | End: 2022-11-15

## 2022-11-15 RX ORDER — LIDOCAINE 40 MG/G
CREAM TOPICAL
Status: DISCONTINUED | OUTPATIENT
Start: 2022-11-15 | End: 2022-11-15 | Stop reason: HOSPADM

## 2022-11-15 RX ORDER — ACETAMINOPHEN 325 MG/1
975 TABLET ORAL ONCE
Status: DISCONTINUED | OUTPATIENT
Start: 2022-11-15 | End: 2022-11-15 | Stop reason: HOSPADM

## 2022-11-15 RX ORDER — OXYCODONE HYDROCHLORIDE 5 MG/1
5 TABLET ORAL EVERY 4 HOURS PRN
Status: DISCONTINUED | OUTPATIENT
Start: 2022-11-15 | End: 2022-11-17 | Stop reason: HOSPADM

## 2022-11-15 RX ORDER — PROCHLORPERAZINE 25 MG
25 SUPPOSITORY, RECTAL RECTAL EVERY 12 HOURS PRN
Status: DISCONTINUED | OUTPATIENT
Start: 2022-11-15 | End: 2022-11-17 | Stop reason: HOSPADM

## 2022-11-15 RX ORDER — ACETAMINOPHEN 325 MG/1
975 TABLET ORAL EVERY 6 HOURS
Status: DISCONTINUED | OUTPATIENT
Start: 2022-11-15 | End: 2022-11-17 | Stop reason: HOSPADM

## 2022-11-15 RX ORDER — MISOPROSTOL 200 UG/1
400 TABLET ORAL
Status: DISCONTINUED | OUTPATIENT
Start: 2022-11-15 | End: 2022-11-17 | Stop reason: HOSPADM

## 2022-11-15 RX ORDER — BUPIVACAINE HYDROCHLORIDE 7.5 MG/ML
INJECTION, SOLUTION INTRASPINAL
Status: COMPLETED | OUTPATIENT
Start: 2022-11-15 | End: 2022-11-15

## 2022-11-15 RX ORDER — CITRIC ACID/SODIUM CITRATE 334-500MG
30 SOLUTION, ORAL ORAL
Status: COMPLETED | OUTPATIENT
Start: 2022-11-15 | End: 2022-11-15

## 2022-11-15 RX ORDER — AMOXICILLIN 250 MG
2 CAPSULE ORAL 2 TIMES DAILY
Status: DISCONTINUED | OUTPATIENT
Start: 2022-11-15 | End: 2022-11-17 | Stop reason: HOSPADM

## 2022-11-15 RX ORDER — SODIUM CHLORIDE, SODIUM LACTATE, POTASSIUM CHLORIDE, CALCIUM CHLORIDE 600; 310; 30; 20 MG/100ML; MG/100ML; MG/100ML; MG/100ML
INJECTION, SOLUTION INTRAVENOUS
Status: DISCONTINUED
Start: 2022-11-15 | End: 2022-11-15 | Stop reason: HOSPADM

## 2022-11-15 RX ORDER — CARBOPROST TROMETHAMINE 250 UG/ML
250 INJECTION, SOLUTION INTRAMUSCULAR
Status: DISCONTINUED | OUTPATIENT
Start: 2022-11-15 | End: 2022-11-15 | Stop reason: HOSPADM

## 2022-11-15 RX ORDER — MISOPROSTOL 200 UG/1
800 TABLET ORAL
Status: DISCONTINUED | OUTPATIENT
Start: 2022-11-15 | End: 2022-11-17 | Stop reason: HOSPADM

## 2022-11-15 RX ORDER — OXYTOCIN/0.9 % SODIUM CHLORIDE 30/500 ML
340 PLASTIC BAG, INJECTION (ML) INTRAVENOUS CONTINUOUS PRN
Status: DISCONTINUED | OUTPATIENT
Start: 2022-11-15 | End: 2022-11-17 | Stop reason: HOSPADM

## 2022-11-15 RX ORDER — CEFAZOLIN SODIUM/WATER 2 G/20 ML
2 SYRINGE (ML) INTRAVENOUS SEE ADMIN INSTRUCTIONS
Status: DISCONTINUED | OUTPATIENT
Start: 2022-11-15 | End: 2022-11-15 | Stop reason: HOSPADM

## 2022-11-15 RX ORDER — SIMETHICONE 80 MG
80 TABLET,CHEWABLE ORAL 4 TIMES DAILY PRN
Status: DISCONTINUED | OUTPATIENT
Start: 2022-11-15 | End: 2022-11-17 | Stop reason: HOSPADM

## 2022-11-15 RX ORDER — OXYTOCIN/0.9 % SODIUM CHLORIDE 30/500 ML
100-340 PLASTIC BAG, INJECTION (ML) INTRAVENOUS CONTINUOUS PRN
Status: DISCONTINUED | OUTPATIENT
Start: 2022-11-15 | End: 2022-11-15 | Stop reason: HOSPADM

## 2022-11-15 RX ORDER — METOCLOPRAMIDE 10 MG/1
10 TABLET ORAL EVERY 6 HOURS PRN
Status: DISCONTINUED | OUTPATIENT
Start: 2022-11-15 | End: 2022-11-17 | Stop reason: HOSPADM

## 2022-11-15 RX ORDER — BUPIVACAINE HYDROCHLORIDE 2.5 MG/ML
INJECTION, SOLUTION EPIDURAL; INFILTRATION; INTRACAUDAL
Status: DISCONTINUED | OUTPATIENT
Start: 2022-11-15 | End: 2022-11-15

## 2022-11-15 RX ORDER — DEXTROSE, SODIUM CHLORIDE, SODIUM LACTATE, POTASSIUM CHLORIDE, AND CALCIUM CHLORIDE 5; .6; .31; .03; .02 G/100ML; G/100ML; G/100ML; G/100ML; G/100ML
INJECTION, SOLUTION INTRAVENOUS CONTINUOUS
Status: DISCONTINUED | OUTPATIENT
Start: 2022-11-15 | End: 2022-11-17 | Stop reason: HOSPADM

## 2022-11-15 RX ORDER — FENTANYL CITRATE 50 UG/ML
INJECTION, SOLUTION INTRAMUSCULAR; INTRAVENOUS
Status: COMPLETED | OUTPATIENT
Start: 2022-11-15 | End: 2022-11-15

## 2022-11-15 RX ORDER — METHYLERGONOVINE MALEATE 0.2 MG/ML
200 INJECTION INTRAVENOUS
Status: DISCONTINUED | OUTPATIENT
Start: 2022-11-15 | End: 2022-11-15 | Stop reason: HOSPADM

## 2022-11-15 RX ORDER — KETOROLAC TROMETHAMINE 30 MG/ML
30 INJECTION, SOLUTION INTRAMUSCULAR; INTRAVENOUS EVERY 6 HOURS
Status: COMPLETED | OUTPATIENT
Start: 2022-11-15 | End: 2022-11-16

## 2022-11-15 RX ORDER — CARBOPROST TROMETHAMINE 250 UG/ML
250 INJECTION, SOLUTION INTRAMUSCULAR
Status: DISCONTINUED | OUTPATIENT
Start: 2022-11-15 | End: 2022-11-17 | Stop reason: HOSPADM

## 2022-11-15 RX ORDER — OXYTOCIN/0.9 % SODIUM CHLORIDE 30/500 ML
340 PLASTIC BAG, INJECTION (ML) INTRAVENOUS CONTINUOUS PRN
Status: DISCONTINUED | OUTPATIENT
Start: 2022-11-15 | End: 2022-11-15 | Stop reason: HOSPADM

## 2022-11-15 RX ORDER — CITRIC ACID/SODIUM CITRATE 334-500MG
SOLUTION, ORAL ORAL
Status: COMPLETED
Start: 2022-11-15 | End: 2022-11-15

## 2022-11-15 RX ADMIN — ONDANSETRON 4 MG: 2 INJECTION INTRAMUSCULAR; INTRAVENOUS at 13:14

## 2022-11-15 RX ADMIN — SODIUM CHLORIDE, POTASSIUM CHLORIDE, SODIUM LACTATE AND CALCIUM CHLORIDE: 600; 310; 30; 20 INJECTION, SOLUTION INTRAVENOUS at 12:43

## 2022-11-15 RX ADMIN — Medication 2 G: at 12:56

## 2022-11-15 RX ADMIN — FENTANYL CITRATE 15 MCG: 50 INJECTION, SOLUTION INTRAMUSCULAR; INTRAVENOUS at 12:50

## 2022-11-15 RX ADMIN — BUPIVACAINE 10 ML: 13.3 INJECTION, SUSPENSION, LIPOSOMAL INFILTRATION at 14:13

## 2022-11-15 RX ADMIN — Medication 30 ML: at 12:40

## 2022-11-15 RX ADMIN — MORPHINE SULFATE 0.15 MG: 1 INJECTION EPIDURAL; INTRATHECAL; INTRAVENOUS at 12:50

## 2022-11-15 RX ADMIN — KETOROLAC TROMETHAMINE 30 MG: 30 INJECTION, SOLUTION INTRAMUSCULAR at 20:41

## 2022-11-15 RX ADMIN — KETOROLAC TROMETHAMINE 30 MG: 30 INJECTION, SOLUTION INTRAMUSCULAR at 13:38

## 2022-11-15 RX ADMIN — PHENYLEPHRINE HYDROCHLORIDE 50 MCG: 10 INJECTION INTRAVENOUS at 12:58

## 2022-11-15 RX ADMIN — BUPIVACAINE HYDROCHLORIDE IN DEXTROSE 1.6 ML: 7.5 INJECTION, SOLUTION SUBARACHNOID at 12:50

## 2022-11-15 RX ADMIN — Medication 75 MCG/MIN: at 12:56

## 2022-11-15 RX ADMIN — ACETAMINOPHEN 975 MG: 325 TABLET, FILM COATED ORAL at 22:07

## 2022-11-15 RX ADMIN — OXYTOCIN-SODIUM CHLORIDE 0.9% IV SOLN 30 UNIT/500ML 300 ML/HR: 30-0.9/5 SOLUTION at 13:16

## 2022-11-15 RX ADMIN — BUPIVACAINE HYDROCHLORIDE 20 ML: 2.5 INJECTION, SOLUTION EPIDURAL; INFILTRATION; INTRACAUDAL at 14:13

## 2022-11-15 RX ADMIN — SENNOSIDES AND DOCUSATE SODIUM 2 TABLET: 50; 8.6 TABLET ORAL at 21:05

## 2022-11-15 RX ADMIN — ACETAMINOPHEN 975 MG: 325 TABLET, FILM COATED ORAL at 16:20

## 2022-11-15 RX ADMIN — SODIUM CITRATE AND CITRIC ACID MONOHYDRATE 30 ML: 500; 334 SOLUTION ORAL at 12:40

## 2022-11-15 ASSESSMENT — ACTIVITIES OF DAILY LIVING (ADL)
ADLS_ACUITY_SCORE: 24
ADLS_ACUITY_SCORE: 31
ADLS_ACUITY_SCORE: 24
ADLS_ACUITY_SCORE: 24
ADLS_ACUITY_SCORE: 20

## 2022-11-15 NOTE — CARE PLAN
Pt [facility] a [facility]  section at 1032.  She is afebrile. VSS. Denies headaches, upper epigastric pain, nausea, vomiting,visual disturbances, leaking of fluid, bleeding or contractions. Baby is active with moderate variability and accelerations. Dr. Gomes notified of patient's arrival.

## 2022-11-15 NOTE — DISCHARGE SUMMARY
[facility] Discharge Summary    Meagan Mcgowan MRN# [id number]   Age: 37 year old YOB: 1985     Date of Admission:  11/15/2022  Date of Discharge:  2022  Admitting Physician:  Morelia Lira MD  Discharge Physician:  Loni Hollis MD    Admit Dx:   - Intrauterine pregnancy at 39w0d  - AMA  - Asthma  - History of  x1  - History of gestational hypertension    Discharge Dx:  - Same as above, s/p RLTCS low transverse  section    Procedures:  - Repeat low transverse  section with single layer uterine closure via Pfannenstiel incision  - Spinal analgesia    Admit HPI:  Meagan Mcgowan is a 37 year old  at 39w0d by 13w3d US who presents today for repeat  section delivery. Patient feel well this morning without complaint.     Pregnancy notable for:   History of gestational hypertension   AMA  Asthma  History of one prior       Her previous pregnancies were notable for gestational hypertension. Her delivery was complicated by fetal bradycardia leading to primary . Denies history of postpartum hemorrhage, shoulder dystocia, pre-eclampsia.   She reports good fetal movement. Denies LOF, vaginal bleeding, or contractions.  She denies fever, chills, SOB, chest pain, palpitations, N/V, LE swelling/tenderness.  No concerns for headache, vision changes, RUQ or epigastric pain      Please see her admit H&P for full details of her PMH, PSH, Meds, Allergies and exam on admit.    Operative Course:  Surgery was uncomplicated. EBL from the delivery was 344 ml. Please see her  Section Operative Note for full details regarding her delivery.    Operative Findings:   1. Single, viable male infant at 1314 hours on 2022. Apgars of 9 and 9 at one and five minutes.  Birth weight: 3210 g.  Fetal presentation: vertex. Amniotic fluid: clear.    2. Placenta intact with 3 vessel cord.     3. Normal appearing uterus,  fallopian tubes, ovaries.   4.  No intraabdominal adhesions. Trace fascial adhesions    Postoperative Course:  Her postoperative course was uncomplicated. On POD#2, she was meeting all of her postpartum goals and deemed stable for discharge. She was voiding without difficulty, tolerating a regular diet without nausea and vomiting, her pain was well controlled on oral pain medicines and her lochia was appropriate. Her hemoglobin prior to delivery was 12.5 and after delivery was 10.9. Her Rh status was positive and Rhogam was not indicated.    Discharge Medications:     Review of your medicines      CONTINUE these medicines which have NOT CHANGED      Dose / Directions   acetaminophen 325 MG tablet  Commonly known as: TYLENOL  Used for: Multigravida of advanced maternal age in third trimester      Dose: 650 mg  Take 2 tablets (650 mg) by mouth every 6 hours as needed for mild pain Start after Delivery.  Refills: 0     butalbital-acetaminophen-caffeine -40 MG tablet  Commonly known as: ESGIC  Used for: Migraine without status migrainosus, not intractable, unspecified migraine type      Dose: 1 tablet  Take 1 tablet by mouth every 4 hours as needed for headaches  Quantity: 5 tablet  Refills: 0     cholecalciferol 25 mcg (1000 units) capsule  Commonly known as: VITAMIN D3      Dose: 3 capsule  Take 3 capsules by mouth daily  Refills: 0     fexofenadine 30 MG ODT  Commonly known as: ALLEGRA      Dose: 30 mg  Take 30 mg by mouth 2 times daily  Refills: 0     hydrOXYzine 50 MG capsule  Commonly known as: VISTARIL  Used for: Pregnancy headache in third trimester      Dose:  mg  Take 1-2 capsules ( mg) by mouth nightly as needed for itching  Quantity: 30 capsule  Refills: 1     ibuprofen 200 MG tablet  Commonly known as: ADVIL/MOTRIN  Used for: Multigravida of advanced maternal age in third trimester      Dose: 600 mg  Take 3 tablets (600 mg) by mouth every 6 hours as needed for moderate pain Start after  delivery  Refills: 0     prenatal multivitamin w/iron 27-0.8 MG tablet  Used for: Pregnancy test performed, pregnancy confirmed      Dose: 1 tablet  Take 1 tablet by mouth daily  Quantity: 90 tablet  Refills: 3     Probiotic-10 Chew      Refills: 0     senna-docusate 8.6-50 MG tablet  Commonly known as: SENOKOT-S/PERICOLACE  Used for: Multigravida of advanced maternal age in third trimester      Dose: 1 tablet  Take 1 tablet by mouth daily for 30 days Start after delivery.  Refills: 0     ZYRTEC PO      Refills: 0        STOP taking    aspirin 81 MG EC tablet  Commonly known as: ASA                 Discharge/Disposition:  Meagan Mcgowan was discharged to home in stable condition with the following instructions/medications:  1) Call for temperature > 100.4, bright red vaginal bleeding >1 pad an hour x 2 hours, foul smelling vaginal discharge, pain not controlled by usual oral pain meds, persistent nausea and vomiting not controlled on medications, drainage or redness from incision site  2) She desired POPs for contraception.  3) For feeding she decided to breastfeed.  4) She was instructed to follow-up with her primary OB in 6 weeks for a routine postpartum visit.  5) Discharge activity:  No heavy lifting >15 lbs or strenuous activity for 6 weeks, pelvic rest for 6 weeks, no driving or operating machinery while on narcotics.    Ana Chaudhary MD  Obstetrics & Gynecology, PGY-3  11/17/2022 8:46 AM

## 2022-11-15 NOTE — H&P
OB History and Physical     Meagan Mcgowan    MRN# [id number]  YOB: 1985      HPI: Meagan Mcgowan is a 37 year old  at 39w0d by 13w3d US who presents today for repeat  section delivery. Patient feel well this morning without complaint.    Pregnancy notable for:   History of gestational hypertension   AMA  Asthma  History of one prior      Her previous pregnancies were notable for gestational hypertension. Her delivery was complicated by fetal bradycardia leading to primary . Denies history of postpartum hemorrhage, shoulder dystocia, pre-eclampsia.     She reports good fetal movement. Denies LOF, vaginal bleeding, or contractions.  She denies fever, chills, SOB, chest pain, palpitations, N/V, LE swelling/tenderness.  No concerns for headache, vision changes, RUQ or epigastric pain      Prenatal Labs:   Lab Results   Component Value Date    ABO B 2021    RH Pos 2021    AS Negative 2022    HEPBANG Nonreactive 2022    HGB 12.5 2022       GBS Status:   No results found for: GBS      OBHX:   OB History    Para Term  AB Living   2 1 1 0 0 1   SAB IAB Ectopic Multiple Live Births   0 0 0 0 1      # Outcome Date GA Lbr Inder/2nd Weight Sex Delivery Anes PTL Lv   2 Current            1 Term 21 39w5d  3.09 kg (6 lb 13 oz) M CS-LTranv  N JOURDAN      Complications: Fetal Intolerance      Name: Khai      Apgar1: 7  Apgar5: 7      Obstetric Comments   Khai       MedicalHX:   Past Medical History:   Diagnosis Date    Abnormal Pap smear of cervix     Anemia     Mild asthma        SurgicalHX:   Past Surgical History:   Procedure Laterality Date     SECTION N/A 2021    Procedure:  SECTION;  Surgeon: Julissa Garcia MD;  Location: [facility] L[facility]    wisdom teeth         Medications:   No current facility-administered medications on file prior to encounter.  aspirin (ASA) 81 MG EC tablet, Take 1 tablet (81 mg) by mouth  daily  butalbital-acetaminophen-caffeine (ESGIC) -40 MG tablet, Take 1 tablet by mouth every 4 hours as needed for headaches  Cetirizine HCl (ZYRTEC PO),   cholecalciferol (VITAMIN D3) 25 mcg (1000 units) capsule, Take 3 capsules by mouth daily  fexofenadine (ALLEGRA) 30 MG ODT, Take 30 mg by mouth 2 times daily  hydrOXYzine (VISTARIL) 50 MG capsule, Take 1-2 capsules ( mg) by mouth nightly as needed for itching  Prenatal Vit-Fe Fumarate-FA (PRENATAL MULTIVITAMIN W/IRON) 27-0.8 MG tablet, Take 1 tablet by mouth daily  Probiotic Product (PROBIOTIC-10) CHEW,         Allergies:  No Known Allergies    FamilyHX:      Family History   Problem Relation Age of Onset    Asthma Mother     Tongue cancer Father     Hypertension Father     Diabetes Paternal Grandmother     Heart Failure Paternal Grandmother     Childhood Heart Disease Brother        SocialHX:   Social History     Socioeconomic History    Marital status:    Tobacco Use    Smoking status: Never    Smokeless tobacco: Never   Substance and Sexual Activity    Alcohol use: Not Currently    Drug use: Never    Sexual activity: Yes     Partners: Male       ROS: 10 point ROS negative other than above    Physical Exam:  Patient Vitals for the past 24 hrs:   BP Temp Temp src Resp SpO2   11/15/22 1050 129/86 98.2  F (36.8  C) Oral 16 98 %     General: AAOx3, appropriately interactive, NAD, appears generally well  CV: RRR, normal S1/S2, no m/r/g  Lungs: CTAB, non-labored breathing, no wheezes, rales, or rhonchi  Abdomen: soft, gravid, non-tender, EFW 7#  Extremities: Non-tender with trace edema bilaterally in LE    FHT: 120 bpm moderate variability,  accels present, no decels   [place]: 1-2 contractions in ten minutes    Labs:    Latest Reference Range & Units 11/14/22 09:52   WBC 4.0 - 11.0 10e3/uL 9.8   Hemoglobin 11.7 - 15.7 g/dL 12.5   Hematocrit 35.0 - 47.0 % 37.9   Platelet Count 150 - 450 10e3/uL 269   RBC Count 3.80 - 5.20 10e6/uL 4.18   MCV 78 - 100  fL 91   MCH 26.5 - 33.0 pg 29.9   MCHC 31.5 - 36.5 g/dL 33.0   RDW 10.0 - 15.0 % 13.2   ABO/Rh(D)  B POS   Antibody Screen Negative  Negative   SPECIMEN EXPIRATION DATE  [id number]   Treponema Antibodies Nonreactive  Nonreactive   SARS CoV2 PCR Negative  Negative       Assessment & Plan: 37 year old  at 39w0d by 13w3d US, here for RLTCS. Pregnancy is notable for AMA, ashtma, and history of gestational hypertension.     # Scheduled Repeat  Section  Indicated due to history of . Prior (s) for fetal bradycardia. Previous op notes reported no adhesive disease. Will plan for a pfannenstiel skin incision with low transverse hysterotomy  - Labs: CBC, T&S, RPR   - Pre-op Hgb 12.5, Plts 269  - Placenta: anterior  - Anesthesia: spinal  - 2g Ancef   - PPH Meds/Ppx: avoid hemabate and methergine if possible  - Diet: NPO  - Consent: Discussed risks and benefits of procedure, including but not limited to bleeding, infection, injury to surrounding organs, injury to infant, and the potential need for another surgery should some injury go unrecognized or patient were to have continued bleeding. Patient had time to ask questions and expressed understanding of procedure and associated risks. Agreed to blood transfusion if necessary. Consent signed.    # Asthma  - Avoid hemabate in setting of pph  - Continue PTA allegra and zyrtec    # History of gestational hypertension  - BP normotensive today  - No pta medications  - Continue serial BP monitoring  - IV antihypertensives for sustained severe range blood pressures    # AMA  - Normal NIPT, AFP, and level 2 US    # PNC  - Rh pos, Rubella immune, GCT wnl  - Other prenatal labs wnl  - Imaging: EFW 2195g (50%), AC 72% (33w1d)     # FWB:   Cat I  tracing, reactive and reassuring  - To OR as able  - Continuous Fetal Monitoring  - Intrauterine resuscitative measures prn      Patient discussed with Dr. Astrid Gomes MD  OBGYN PGY-3  November 15,   11:58 AM              Physician Attestation   I personally examined and evaluated this patient.  I discussed the patient with the resident/fellow and care team, and agree with the assessment and plan of care as documented in the note of 11/15/22.      I personally reviewed vital signs, medications, labs, and fetal heart rate tracing .    Key findings: Category 1 tracing, normal blood pressure.   Proceed with   Morelia Lira MD  Date of Service (when I saw the patient): 11/15/22

## 2022-11-15 NOTE — ANESTHESIA PROCEDURE NOTES
"Intrathecal injection Procedure Note    Pre-Procedure   Staff -        Anesthesiologist:  Eilda Rocha MD       Resident/Fellow: Josh Silva MD       Performed By: resident       Location: OR       Pre-Anesthestic Checklist: patient identified, IV checked, risks and benefits discussed, informed consent, monitors and equipment checked, pre-op evaluation, at physician/surgeon's request and post-op pain management  Timeout:       Correct Patient: Yes        Correct Procedure: Yes        Correct Site: Yes        Correct Position: Yes   Procedure Documentation  Procedure: intrathecal injection       Diagnosis: labor analgesia       Patient Position: sitting       Skin prep: Chloraprep       Insertion Site: L3-4. (midline approach).       Needle Gauge: 25.        Needle Length (Inches): 3.5        Spinal Needle Type: Pencan       Introducer used       # of attempts: 1 and  # of redirects:  1    Assessment/Narrative         Paresthesias: No.       CSF fluid: clear.       Opening pressure was cmH2O while  Sitting.      Medication(s) Administered   0.75% Hyperbaric Bupivacaine (Intrathecal) - Intrathecal   1.6 mL - 11/15/2022 12:50:00 PM  Fentanyl PF (Intrathecal) - Intrathecal   15 mcg - 11/15/2022 12:50:00 PM  Morphine PF 1 mg/mL (Intrathecal) - Intrathecal   0.15 mg - 11/15/2022 12:50:00 PM    FOR [facility] ([facility]) ONLY:   Pain Team Contact information: please page the Pain Team Via [employer]. Search \"Pain\". During daytime hours, please page the attending first. At night please page the resident first.    "

## 2022-11-15 NOTE — OP NOTE
[facility]   OPERATIVE NOTE:  SECTION     Surgery Date:  November 15, 2022  Surgeon(s): Morelia Lira MD  Assistants:  Tim Gomes MD, PGY-3    Preoperative Diagnoses:  1.  at 39w0d  2. History of  section x1  3. AMA  4. Asthma  5. History of gestational HTN    Postoperative diagnoses:  1.  at 38w6d, now delivered    Procedure performed:  Repeat low segment transverse  section via Pfannenstiel skin incision with single layer uterine closure    Anesthesia:  Spinal with duramorph  Est Blood Loss (mL): 344 mL  Specimens: None  Complications: None      Operative findings:   1. Single, viable male infant at 1314 hours on 2022. Apgars of 9 and 9 at one and five minutes.  Birth weight: 3210 g.  Fetal presentation: vertex. Amniotic fluid: clear.    2. Placenta intact with 3 vessel cord.     3. Normal appearing uterus, fallopian tubes, ovaries.   4.  No intraabdominal adhesions. Mild fascial adhesions to rectus.    Indication: Meagan Mcgowan is a 37 year old, , who was admitted at 39w0d by 13w3d ultrasound for RLTCS. The risks, benefits, and alternatives of  delivery were explained and the patient agreed to proceed.     Procedure details:  After obtaining informed consent, the patient was taken to the operating room. She received ancef prior to the skin incision. She was placed in the dorsal supine position with a leftward tilt and prepped and draped in the usual sterile fashion.     Following test of adequate spinal anesthesia, the abdomen was entered through a transverse skin incision around her previous scar. Previous keloid was excised with sharp dissection and cautery. The skin incision was carried through the subcutaneous tissue to the fascia.  Fascia was incised in the midline and extended laterally with the Garnett scissors.  The superior margin of the fascial incision was grasped with Kocher clamps and dissected from the  underlying muscle with sharp and blunt dissecton, which was then repeated at the lower margin of the fascial incision.  The muscle was  in the midline.      The peritoneum was entered bluntly and the opening extended by digital dissection with care to avoid the bladder. The lower segment of the uterus was opened sharply in a transverse fashion and extended with digital pressure. The infant's head was noted to be in the vertex position. It was elevated to the level of the hysterotomy and was delivered atraumatically, shoulders delivered easily thereafter. The cord was doubly clamped after 60 seconds and cut and the infant was handed off to the waiting [facility] staff. A segment of the cord was cut and set aside for cord gases if needed.     The placenta was expressed.  The uterus was left in situ and cleared of all clots and debris.  The uterus was massaged and was noted to be firm.  Pitocin was given through the running IV.  With vigorous massage as well as administration of pitocin, good uterine tone was achieved. The hysterotomy was repaired with 0-vicryl suture in a running locked fashion with good hemostasis achieved.     The bilateral pericolic gutters were suctioned.  The hysterotomy was again inspected and found to be hemostatic.  The abdominal wall was examined and also found to be hemostatic.  The fascia was closed with a running suture of 0-Vicryl.  Subcutaneous tissue was irrigated. Areas that were not hemostatic were controlled with cautery. The subcutaneous tissue was less than 2cm in depth but was reapproximated with 3-0 vicryl to reduce tension on skin closure. . The skin was closed with 4-0 Vicryl. The patient tolerated the procedure well and was taken to the recovery room in stable condition. All sponge, needle and instrument counts were correct x2.     Dr. Lira was present for the entire procedure.     Tim Gomes MD  OBGYN PGY-3  November 15, 2022     Physician Attestation   I was present  for the entire procedure between opening and closing.    Morelia Lira MD  Date of Service (when I saw the patient): 11/15/22

## 2022-11-15 NOTE — ANESTHESIA PREPROCEDURE EVALUATION
Anesthesia Pre-Procedure Evaluation    Patient: Meagan Mcgowan   MRN: [id number] : 1985        Procedure : Procedure(s):   SECTION          Past Medical History:   Diagnosis Date     Abnormal Pap smear of cervix      Anemia      Mild asthma       Past Surgical History:   Procedure Laterality Date      SECTION N/A 2021    Procedure:  SECTION;  Surgeon: Julissa Garcia MD;  Location: [facility] L+D     wisdom teeth        No Known Allergies   Social History     Tobacco Use     Smoking status: Never     Smokeless tobacco: Never   Substance Use Topics     Alcohol use: Not Currently      Wt Readings from Last 1 Encounters:   11/10/22 78.5 kg (173 lb)        Anesthesia Evaluation   Pt has had prior anesthetic. Type: OB Labor Epidural.    No history of anesthetic complications       ROS/MED HX  ENT/Pulmonary:     (+) Mild Persistent, asthma     Neurologic:  - neg neurologic ROS     Cardiovascular: Comment: H/o Gestational Hypertension      METS/Exercise Tolerance:     Hematologic:     (+) no thrombocytopenia,  (-) anemia   Musculoskeletal:       GI/Hepatic:  - neg GI/hepatic ROS     Renal/Genitourinary:       Endo:  - neg endo ROS     Psychiatric/Substance Use:  - neg psychiatric ROS     Infectious Disease:       Malignancy:       Other:      (+) , previous ,         Physical Exam    Airway        Mallampati: II   TM distance: > 3 FB   Neck ROM: full   Mouth opening: > 3 cm    Respiratory Devices and Support         Dental  no notable dental history         Cardiovascular   cardiovascular exam normal          Pulmonary   pulmonary exam normal                OUTSIDE LABS:  CBC:   Lab Results   Component Value Date    WBC 9.8 2022    WBC 12.8 (H) 2022    HGB 12.5 2022    HGB 11.2 (L) 2022    HCT 37.9 2022    HCT 33.6 (L) 2022     2022     2022     BMP:   Lab Results   Component Value Date    CR 0.40 (L) 2022    CR  0.61 08/24/2021     COAGS: No results found for: PTT, INR, FIBR  POC:   Lab Results   Component Value Date    HCG Positive (A) 01/18/2021     HEPATIC:   Lab Results   Component Value Date    ALT 17 09/30/2022    AST 12 09/30/2022     OTHER: No results found for: PH, LACT, A1C, VERONICA, PHOS, MAG, LIPASE, AMYLASE, TSH, T4, T3, CRP, SED    Anesthesia Plan    ASA Status:  2      Anesthesia Type: Spinal.   Induction: N/a.   Maintenance: N/A.   Techniques and Equipment:       - Drips/Meds: Phenylephrine     Consents    Anesthesia Plan(s) and associated risks, benefits, and realistic alternatives discussed. Questions answered and patient/representative(s) expressed understanding.     - Discussed: Risks, Benefits and Alternatives for BOTH SEDATION and the PROCEDURE were discussed     - Discussed with:  Patient      - Extended Intubation/Ventilatory Support Discussed: No.      - Patient is DNR/DNI Status: No    Use of blood products discussed: Yes.     - Discussed with: Patient.     - Consented: consented to blood products            Reason for refusal: other.     Postoperative Care    Pain management: Peripheral nerve block (Single Shot), Neuraxial analgesia, Multi-modal analgesia.   PONV prophylaxis: Ondansetron (or other 5HT-3)     Comments:           neg OB ROS.       Josh Viveros MD

## 2022-11-15 NOTE — ANESTHESIA CARE TRANSFER NOTE
Patient: Meagan Mcgowan    Procedure: Procedure(s):   SECTION       Diagnosis: H/O  section [Z98.891]  Diagnosis Additional Information: No value filed.    Anesthesia Type:   Spinal     Note:    Oropharynx: spontaneously breathing  Level of Consciousness: awake      Independent Airway: airway patency satisfactory and stable  Dentition: dentition unchanged  Vital Signs Stable: post-procedure vital signs reviewed and stable  Report to RN Given: handoff report given  Patient transferred to: PACU    Handoff Report: Identifed the Patient, Identified the Reponsible Provider, Reviewed the pertinent medical history, Discussed the surgical course, Reviewed Intra-OP anesthesia mangement and issues during anesthesia, Set expectations for post-procedure period and Allowed opportunity for questions and acknowledgement of understanding      Vitals:  Vitals Value Taken Time   /83 11/15/22 1430   Temp 36.4  C (97.5  F) 11/15/22 1420   Pulse 101 11/15/22 1432   Resp 16 11/15/22 1430   SpO2 97 % 11/15/22 1432   Vitals shown include unvalidated device data.    Electronically Signed By: Josh Viveros MD  November 15, 2022  2:33 PM

## 2022-11-15 NOTE — PROGRESS NOTES
Patient transferred to unit via zoom cart  at 1610 accompanied by . Report received from Brigitte YODER and baby band was double checked. Patient oriented to room set up and plan of care. No concerns presented at this moment.

## 2022-11-15 NOTE — PLAN OF CARE
Goal Outcome Evaluation:  Pt S/P repeat CS. VSS, assessment WDL. QBL in . Tap block done in OR. Denies Pain in PACU. Output adequate from mckeon. Breast feeding initiated in PACU. Bonding well with baby. Spouse supportive at bedside. Tolerating ice chips/water. Will anticipate transfer to [facility].

## 2022-11-15 NOTE — ANESTHESIA PROCEDURE NOTES
TAP Procedure Note    Pre-Procedure   Staff -        Anesthesiologist:  Elida Rocha MD       Resident/Fellow: Josh Silva MD       Performed By: resident       Location: pre-op       Pre-Anesthestic Checklist: patient identified, IV checked, site marked, risks and benefits discussed, informed consent, monitors and equipment checked, pre-op evaluation, at physician/surgeon's request and post-op pain management  Timeout:       Correct Patient: Yes        Correct Procedure: Yes        Correct Site: Yes        Correct Position: Yes        Correct Laterality: Yes        Site Marked: Yes  Procedure Documentation  Procedure: TAP       Diagnosis: POST OPERATIVE PAIN       Laterality: bilateral       Patient Position: supine       Patient Prep/Sterile Barriers: sterile gloves, mask       Skin prep: Chloraprep       Needle Type: short bevel       Needle Gauge: 21.        Needle Length (millimeters): 110        Ultrasound guided       1. Ultrasound was used to identify targeted nerve, plexus, vascular marker, or fascial plane and place a needle adjacent to it in real-time.       2. Ultrasound was used to visualize the spread of anesthetic in close proximity to the above referenced structure.       3. A permanent image is entered into the patient's record.       4. The visualized anatomic structures appeared normal.       5. There were no apparent abnormal pathologic findings.    Assessment/Narrative         The placement was negative for: blood aspirated, painful injection and site bleeding       Paresthesias: No.       Bolus given via needle..        Secured via.        Insertion/Infusion Method: Single Shot       Complications: none       Injection made incrementally with aspirations every 5 mL.    Medication(s) Administered   Bupivacaine 0.25% PF (Infiltration) - Infiltration   20 mL - 11/15/2022 2:13:00 PM  Bupivacaine liposome (Exparel) 1.3% LA inj susp (Infiltration) - Infiltration   10 mL -  "11/15/2022 2:13:00 PM    FOR [facility]) ONLY:   Pain Team Contact information: please page the Pain Team Via [facility]. Search \"Pain\". During daytime hours, please page the attending first. At night please page the resident first.    "

## 2022-11-16 LAB — HGB BLD-MCNC: 10.9 G/DL (ref 11.7–15.7)

## 2022-11-16 PROCEDURE — 250N000011 HC RX IP 250 OP 636: Performed by: STUDENT IN AN ORGANIZED HEALTH CARE EDUCATION/TRAINING PROGRAM

## 2022-11-16 PROCEDURE — 85018 HEMOGLOBIN: CPT | Performed by: STUDENT IN AN ORGANIZED HEALTH CARE EDUCATION/TRAINING PROGRAM

## 2022-11-16 PROCEDURE — 36415 COLL VENOUS BLD VENIPUNCTURE: CPT | Performed by: STUDENT IN AN ORGANIZED HEALTH CARE EDUCATION/TRAINING PROGRAM

## 2022-11-16 PROCEDURE — 250N000013 HC RX MED GY IP 250 OP 250 PS 637: Performed by: STUDENT IN AN ORGANIZED HEALTH CARE EDUCATION/TRAINING PROGRAM

## 2022-11-16 PROCEDURE — 120N000002 HC R&B MED SURG/OB UMMC

## 2022-11-16 RX ADMIN — ACETAMINOPHEN 975 MG: 325 TABLET, FILM COATED ORAL at 11:07

## 2022-11-16 RX ADMIN — ACETAMINOPHEN 975 MG: 325 TABLET, FILM COATED ORAL at 17:10

## 2022-11-16 RX ADMIN — KETOROLAC TROMETHAMINE 30 MG: 30 INJECTION, SOLUTION INTRAMUSCULAR at 03:17

## 2022-11-16 RX ADMIN — KETOROLAC TROMETHAMINE 30 MG: 30 INJECTION, SOLUTION INTRAMUSCULAR at 09:16

## 2022-11-16 RX ADMIN — ACETAMINOPHEN 975 MG: 325 TABLET, FILM COATED ORAL at 05:07

## 2022-11-16 RX ADMIN — SENNOSIDES AND DOCUSATE SODIUM 2 TABLET: 50; 8.6 TABLET ORAL at 09:15

## 2022-11-16 RX ADMIN — IBUPROFEN 800 MG: 800 TABLET, FILM COATED ORAL at 21:30

## 2022-11-16 RX ADMIN — IBUPROFEN 800 MG: 800 TABLET, FILM COATED ORAL at 15:13

## 2022-11-16 RX ADMIN — SENNOSIDES AND DOCUSATE SODIUM 2 TABLET: 50; 8.6 TABLET ORAL at 20:09

## 2022-11-16 RX ADMIN — OXYCODONE HYDROCHLORIDE 5 MG: 5 TABLET ORAL at 23:14

## 2022-11-16 RX ADMIN — ACETAMINOPHEN 975 MG: 325 TABLET, FILM COATED ORAL at 23:14

## 2022-11-16 ASSESSMENT — ACTIVITIES OF DAILY LIVING (ADL)
ADLS_ACUITY_SCORE: 24
ADLS_ACUITY_SCORE: 20
ADLS_ACUITY_SCORE: 24

## 2022-11-16 NOTE — PROGRESS NOTES
[facility]   Postpartum Note    Name:  Meagan Mcgowan  MRN: [id number]    S: Patient doing well. Pain is well controlled. Tolerating regular diet. Ambulating without dizziness. Spontaneously voiding. Reports flatus. Lochia minimal.  Breast feeding. Plans to discuss postpartum contraception at 6 week visit.    O:   Patient Vitals for the past 24 hrs:   BP Temp Temp src Pulse Resp SpO2   22 0503 104/66 97.5  F (36.4  C) Oral 72 16 96 %   22 0112 97/66 97.9  F (36.6  C) Oral 73 15 96 %   11/15/22 2107 108/68 -- -- 83 16 --   11/15/22 2005 116/82 98.1  F (36.7  C) -- 76 -- 97 %   11/15/22 1856 113/75 -- -- 79 -- 98 %   11/15/22 1802 109/64 -- -- 74 16 97 %   11/15/22 1702 121/78 -- -- 85 16 98 %   11/15/22 1627 118/71 98.2  F (36.8  C) Oral -- 16 --   11/15/22 1600 103/70 -- -- 86 16 96 %   11/15/22 1545 113/81 -- -- 96 16 96 %   11/15/22 1530 109/75 -- -- 87 16 95 %   11/15/22 1515 92/58 -- -- -- 16 95 %   11/15/22 1500 115/88 -- -- 90 16 95 %   11/15/22 1445 127/58 -- -- 97 16 --   11/15/22 1430 112/83 -- -- 101 16 95 %   11/15/22 1420 113/80 97.5  F (36.4  C) Oral 87 16 97 %   11/15/22 1050 129/86 98.2  F (36.8  C) Oral -- 16 98 %     Gen:  Resting comfortably, NAD  CV:  Regular rate, well perfused  Pulm:  Breathing room air comfortably  Abd:  Soft, appropriately ttp, non-distended. Fundus firm and below umbilicus, firm and non-tender.  Incision: C/d/i, no surrounding redness or erythema with dressing in place  Ext:  Non-tender, trace LE edema b/l    I/O last 3 completed shifts:  In: 2250 [P.O.:600; I.V.:1650]  Out: 1769 [Urine:1225; Blood:544]    Hgb:   Hemoglobin   Date Value Ref Range Status   2022 10.9 (L) 11.7 - 15.7 g/dL Final   2021 11.5 (L) 11.7 - 15.7 g/dL Final       Assessment/Plan:  37 year old  on POD#1 s/p RLTCS.    Routine postpartum care:  Pain: Well-controlled with ibuprofen, tylenol, oxycodone prn  Hgb: Hgb 12.5> > 10.9. Patient is  asymptomatic from acute blood loss anemia and vitals are reassuring. Will discharge home with PO iron if Hgb <10  Rh: positive  Imm:  Rubella imm  Feed: Breast  BC: Discuss at 6 weeks    POUR  - Straight cath x1; follow up trial void this AM  - Samuel replaced if continued inability to void    Asthma: no issues    Dispo: Discharge on POD#2-3 pending postop goals.    Kaleb Palmer MD, MPH  Ob/Gyn Resident, PGY-2  11/16/22 8:43 AM

## 2022-11-16 NOTE — LACTATION NOTE
This note was copied from a baby's chart.  Consult for:  Patient request     Delivery Information: Baby Eliu was born at 39.0 weeks via repeat c/s on 11/15/22 at 1314.    Maternal Health History: Meagan has a history of gestational hypertension. She is AMA at 37 years of age.    Meagan  her first son, Khai, for 12 months with no concerns. He weaned due to the drop in her supply during pregnancy. Shared that he may or may not show some interest in breastfeeding again as he observes Eliu breastfeeding. ?    Infant information: Eliu is waking regularly to breastfeed. He has age appropriate output and was AGA at birth at 7lb 1.2 oz. He is < 24 hours old. ?    Oral exam of baby:  Deferred as infant on the breast. Meagan denies pain and Eliu is able to sustain latch and was heard swallowing.    Feeding Assessment: Meagan had Eliu latched in a modified laid back/cross cradle position on the right breast when I entered the room. He appeared comfortable and was heard swallowing during the feeding.  Meagan has been able to hand express some colostrum.     Education: early feeding cues, benefits of feeding on cue, breastfeeding positions, signs breastfeeding is going well (comfortable latch, age appropriate output and weight loss, swallowing heard at the breast), satiety cues, expected  output,  weight loss, nutritive vs non-nutritive sucking, benefits of skin to skin, the Second Night, tips for tandem nursing if toddler shows renewed interest in breastfeeding, benefits of breast massage and hand expression of colostrum,  inpatient lactation support and outpatient lactation resources.     Plan: Continue breastfeeding on cue with RN support as needed with a goal of 8-12 feedings per day. Encourage frequent skin to skin, breast massage and hand expression.     Lactation will check in with family again tomorrow.    At discharge Eliu will be seen at [facility]. Meagan shares  there are 2 NPs there that are also lactation consultants. She was encouraged to follow up as needed after discharge.

## 2022-11-16 NOTE — ANESTHESIA POSTPROCEDURE EVALUATION
Patient: Meagan Mcgowan    Procedure: Procedure(s):   SECTION       Anesthesia Type:  Spinal    Note:  Disposition: Inpatient   Postop Pain Control: Uneventful            Sign Out: Well controlled pain   PONV: No   Neuro/Psych: Uneventful            Sign Out: Acceptable/Baseline neuro status   Airway/Respiratory: Uneventful            Sign Out: Acceptable/Baseline resp. status   CV/Hemodynamics: Uneventful            Sign Out: Acceptable CV status; No obvious hypovolemia; No obvious fluid overload   Other NRE: NONE   DID A NON-ROUTINE EVENT OCCUR? No           Last vitals:  Vitals Value Taken Time   /70 11/15/22 1600   Temp 36.4  C (97.5  F) 11/15/22 1420   Pulse 86 11/15/22 1600   Resp 16 11/15/22 1600   SpO2 96 % 11/15/22 1600       Patient Vitals for the past 24 hrs:   BP Temp Temp src Pulse Resp SpO2   11/15/22 1802 109/64 -- -- 74 16 97 %   11/15/22 1702 121/78 -- -- 85 16 98 %   11/15/22 1627 118/71 36.8  C (98.2  F) Oral -- 16 --   11/15/22 1600 103/70 -- -- 86 16 96 %   11/15/22 1545 113/81 -- -- 96 16 96 %   11/15/22 1530 109/75 -- -- 87 16 95 %   11/15/22 1515 92/58 -- -- -- 16 95 %   11/15/22 1500 115/88 -- -- 90 16 95 %   11/15/22 1445 127/58 -- -- 97 16 --   11/15/22 1430 112/83 -- -- 101 16 95 %   11/15/22 1420 113/80 36.4  C (97.5  F) Oral 87 16 97 %   11/15/22 1050 129/86 36.8  C (98.2  F) Oral -- 16 98 %         Electronically Signed By: Krystin Jimenez MD  November 15, 2022  6:16 PM

## 2022-11-16 NOTE — PLAN OF CARE
VSS and postpartum assessments WDL.  Up ad bentley with steady gait and independent with cares.  Bonding well with infant.  Breastfeeding on cue independently.  Pain managed with tylenol, ibuprofen, and toradol.  Patient consistently rating abdominal aching pain 4-5/10 throughout shift.   PIV in right lower forearm.   present and supportive.  Will continue with postpartum cares and education per plan of care.

## 2022-11-16 NOTE — PLAN OF CARE
[id number]    Meagan doing well. Feeling like pain got worse after she took a shower but manageable with ibuprofen, tylenol, and rest. Independent with self and baby cares. PIV removed per Pt request and stable hgb/QBL.

## 2022-11-16 NOTE — PLAN OF CARE
"  Problem: Plan of Care - These are the overarching goals to be used throughout the patient stay.    Goal: Plan of Care Review  Description: The Plan of Care Review/Shift note should be completed every shift.  The Outcome Evaluation is a brief statement about your assessment that the patient is improving, declining, or no change.  This information will be displayed automatically on your shift note.  Outcome: Progressing  Flowsheets (Taken 2022)  Plan of Care Reviewed With: patient  Overall Patient Progress: improving  Goal: Patient-Specific Goal (Individualized)  Description: You can add care plan individualizations to a care plan. Examples of Individualization might be:  \"Parent requests to be called daily at 9am for status\", \"I have a hard time hearing out of my right ear\", or \"Do not touch me to wake me up as it startles me\".  Outcome: Progressing  Flowsheets (Taken 2022)  Anxieties, Fears or Concerns: inability to urinate  Goal: Optimal Comfort and Wellbeing  Outcome: Progressing     Problem: Postpartum ( Delivery)  Goal: Successful Maternal Role Transition  Outcome: Progressing  Goal: Optimal Pain Control and Function  Outcome: Progressing   Goal Outcome Evaluation:      Plan of Care Reviewed With: patient    Overall Patient Progress: improvingOverall Patient Progress: improving  VSS and postpartum assessments WDL.  Up ad bentley with steady gait and independent with cares.  Bonding well with infant.  Breastfeeding on cue independently.  Pain managed with tylenol and toradol.  PIV in right hand, saline locked.   present and supportive.  Will continue with postpartum cares and education per plan of care.          "

## 2022-11-16 NOTE — PROGRESS NOTES
Brief Anesthesia Note    Patient was seen PPD#1 after  with spinal. TAP block was performed. Patient's pain is tolerable. Spinal site was clean, dry, intact. No hematoma or bruising. 5/5 lower extremity strength. No numbness or tingling. No headaches. We discussed red flag symptoms to continue to watch out for. All questions were answered.     Anesthesia will continue to follow peripherally. Please let us know if there are any questions or concerns.     Maxwell Perdomo PGY-3   2022 at 2:52 PM

## 2022-11-16 NOTE — PLAN OF CARE
Goal Outcome Evaluation:  Patient is stable , denies pain nor discomfort. Tolerating fluids well. Breastfeeding independently. Will continue with plan of care.

## 2022-11-17 ENCOUNTER — MEDICAL CORRESPONDENCE (OUTPATIENT)
Dept: HEALTH INFORMATION MANAGEMENT | Facility: CLINIC | Age: 37
End: 2022-11-17

## 2022-11-17 VITALS
OXYGEN SATURATION: 97 % | HEART RATE: 73 BPM | DIASTOLIC BLOOD PRESSURE: 91 MMHG | RESPIRATION RATE: 16 BRPM | SYSTOLIC BLOOD PRESSURE: 138 MMHG | TEMPERATURE: 98 F

## 2022-11-17 PROCEDURE — 250N000013 HC RX MED GY IP 250 OP 250 PS 637: Performed by: STUDENT IN AN ORGANIZED HEALTH CARE EDUCATION/TRAINING PROGRAM

## 2022-11-17 RX ORDER — ACETAMINOPHEN AND CODEINE PHOSPHATE 120; 12 MG/5ML; MG/5ML
0.35 SOLUTION ORAL DAILY
Qty: 90 TABLET | Refills: 0 | Status: SHIPPED | OUTPATIENT
Start: 2022-11-17

## 2022-11-17 RX ORDER — NIFEDIPINE 30 MG/1
30 TABLET, EXTENDED RELEASE ORAL DAILY
Status: DISCONTINUED | OUTPATIENT
Start: 2022-11-17 | End: 2022-11-17

## 2022-11-17 RX ORDER — OXYCODONE HYDROCHLORIDE 5 MG/1
5 TABLET ORAL EVERY 6 HOURS PRN
Qty: 12 TABLET | Refills: 0 | Status: SHIPPED | OUTPATIENT
Start: 2022-11-17

## 2022-11-17 RX ADMIN — IBUPROFEN 800 MG: 800 TABLET, FILM COATED ORAL at 16:11

## 2022-11-17 RX ADMIN — OXYCODONE HYDROCHLORIDE 5 MG: 5 TABLET ORAL at 09:38

## 2022-11-17 RX ADMIN — ACETAMINOPHEN 975 MG: 325 TABLET, FILM COATED ORAL at 17:35

## 2022-11-17 RX ADMIN — SIMETHICONE 80 MG: 80 TABLET, CHEWABLE ORAL at 13:54

## 2022-11-17 RX ADMIN — IBUPROFEN 800 MG: 800 TABLET, FILM COATED ORAL at 09:38

## 2022-11-17 RX ADMIN — IBUPROFEN 800 MG: 800 TABLET, FILM COATED ORAL at 03:31

## 2022-11-17 RX ADMIN — SENNOSIDES AND DOCUSATE SODIUM 2 TABLET: 50; 8.6 TABLET ORAL at 09:38

## 2022-11-17 RX ADMIN — ACETAMINOPHEN 975 MG: 325 TABLET, FILM COATED ORAL at 05:43

## 2022-11-17 RX ADMIN — OXYCODONE HYDROCHLORIDE 5 MG: 5 TABLET ORAL at 17:35

## 2022-11-17 RX ADMIN — ACETAMINOPHEN 975 MG: 325 TABLET, FILM COATED ORAL at 11:37

## 2022-11-17 ASSESSMENT — ACTIVITIES OF DAILY LIVING (ADL)
ADLS_ACUITY_SCORE: 20

## 2022-11-17 NOTE — PROVIDER NOTIFICATION
"   11/17/22 9633   Provider Notification   Provider Name/Title Dr Chapman   Method of Notification Electronic Page   Request Evaluate in Person   Notification Reason Status Update   Pt c/o new pain in L lower inner calf- says had similar feeling in pregnancy. \"Dull pain up and down inner calf.\" No redness, tenderness, change in size of calf.    Dr Hollis call back- not a concern for DVT since Pt experienced it in pregnancy and symptoms not typical for DVT, reassure Pt that it can take up to 2 weeks to resolve. Pt is OK to discharge.   "

## 2022-11-17 NOTE — PROVIDER NOTIFICATION
22 1654   Provider Notification   Provider Name/Title Dr Hollis   Method of Notification Electronic Page   Request Evaluate-Remote   Notification Reason Vital Signs Change;Status Update   Pt asked to check her BP before leavin/91 at 1655. This AM at 0941 was 134/92. No other s/s of pre-e. Pt said hx of elevated BP in prev postpartum, never had pre-e. Pls advise, thank you    Per Dr. Hollis Pt is OK to discharge. Parameters if equal to or over over 150/85 (either/or) with home blood pressure checks, Pt advised to call clinic.     Meagan verbalized understanding.

## 2022-11-17 NOTE — PLAN OF CARE
VSS. Patient c/o pain, Tylenol, Ibuprofen and Oxycodone given and with relief after 1 hour. Fundus firm without massage at  1 cm below level of umbilicus. Patient independent with cares, ambulates in room, voids spontaneously and breastfeeds  every 2- 3 hours. Will continue with plan of care.

## 2022-11-17 NOTE — PLAN OF CARE
Data: Vital signs stable, postpartum assessments within normal limits.   Eating and drinking without nausea or vomiting.  Up ad bentley, and voiding without difficulty. Passing gas/BM.  Feeding baby independently-  breast feeding is going very well.   Pain managed with tylenol and ibuprofen. Pt states she is comfortable.  Incision appears to be healing well, no s/s infection. Some bruising around the area but not worsening, soothed with ice and PO meds.   Discharge outcomes on care plan met.   Action: Review of care plan, teaching, and discharge instructions done.  Response: Patient states understanding and comfort with her discharge instructions and plan of care. All questions addressed. She will discharge home with her family.

## 2022-11-17 NOTE — PROGRESS NOTES
[facility]   Postpartum Note    Name:  Meagan Mcgowan  MRN: [id number]    S: Patient doing well. Pain is well controlled. Tolerating regular diet. Ambulating without dizziness. Spontaneously voiding. Endorsing some pelvic pressure when voiding. Reports flatus. No BM. Lochia minimal.  Breast feeding, although infant demonstrating some difficulty with latching. Plans to discuss postpartum contraception at 6 week visit.    O:   Patient Vitals for the past 24 hrs:   BP Temp Temp src Pulse Resp SpO2   22 0334 118/76 98  F (36.7  C) Axillary 77 17 --   228 118/78 98.3  F (36.8  C) Axillary 79 17 --   22 1329 123/76 97.5  F (36.4  C) Axillary 68 16 97 %   22 0908 121/73 97.5  F (36.4  C) Axillary 73 16 97 %     Gen:  Resting comfortably, NAD  CV:  Regular rate, well perfused  Pulm:  Breathing room air comfortably  Abd:  Soft, appropriately ttp, non-distended. Fundus firm and below umbilicus, firm and non-tender.  Incision: C/d/i, no surrounding redness or erythema. Dressing removed. Steri strips in place.   Ext:  Non-tender, trace LE edema b/l    I/O last 3 completed shifts:  In: -   Out: 1200 [Urine:1200]    Hgb:   Hemoglobin   Date Value Ref Range Status   2022 10.9 (L) 11.7 - 15.7 g/dL Final   2021 11.5 (L) 11.7 - 15.7 g/dL Final       Assessment/Plan:  37 year old  on POD#2 s/p RLTCS.    Routine postpartum care:  Pain: Well-controlled with ibuprofen, tylenol, oxycodone prn  Hgb: Hgb 12.5> > 10.9. Patient is asymptomatic from acute blood loss anemia and vitals are reassuring. Will discharge home with PO iron if Hgb <10  Rh: positive  Imm:  Rubella imm  Feed: Breast  BC: POP on discharge and to further Discuss at 6 weeks    POUR- Resolved    Asthma: no issues    Dispo: Discharge on POD#2-3 pending postop goals.    Kwaku Wright, MS3  [employer]    Resident/Fellow Attestation   I, Kaleb Palmer MD, was present with the  medical/PETR student who participated in the service and in the documentation of the note.  I have verified the history and personally performed the physical exam and medical decision making.  I agree with the assessment and plan of care as documented in the note.      Kaleb Palmer MD  PGY2  Date of Service (when I saw the patient): 11/17/22    Attestation:   This patient was seen and evaluated by me, separately from the house staff team. I have reviewed the note/plan above and agree.     Working on breastfeeding but otherwise is doing well. Taking oxycodone this time postop so script done for #12 tablets to take home. Might discharge home today vs tomorrow pending baby issues. Discharge instructions reviewed and plans pop with condoms until can do nuvaring.     Loni Hollis MD

## 2022-11-17 NOTE — LACTATION NOTE
"This note was copied from a baby's chart.  Brief Lactation Consult    Meagan shares that Eliu ahn fed overnight. He was sleepy this morning, but she shares they just had a \"really good\" feeding where he fed well on both breasts. She denied pain with the feeding and she heard him swallowing. She feels that her breasts feel heavier today.     Meagan believes they will discharge to home this afternoon and she is feeling ready for that.     I encouraged her hand express after feedings for extra breast stimulation until her milk is in. We reviewed signs breastfeeding is going well and when she should contact her pediatrician after discharge.       "

## 2022-11-17 NOTE — PROVIDER NOTIFICATION
11/17/22 1011   Provider Notification   Provider Name/Title Dr. Chapman   Method of Notification Electronic Page   Request Evaluate-Remote   Notification Reason Status Update

## 2022-11-18 NOTE — PROVIDER NOTIFICATION
11/17/22 1858   Provider Notification   Provider Name/Title Dr Palmer G2   Method of Notification Electronic Page   Request Evaluate-Remote   Notification Reason Other     Re: New Nifedipine order placed at 1830- Per Dr. Hollis Pt is OK to discharge with her blood pressures. I gave Pt info on BP parameters when she checks at home. Pt is discharged and transport is on their way. Can you pls cancel the Nifedipine?

## 2022-11-23 ENCOUNTER — HOSPITAL ENCOUNTER (EMERGENCY)
Facility: CLINIC | Age: 37
Discharge: HOME | End: 2022-11-23
Attending: EMERGENCY MEDICINE | Admitting: EMERGENCY MEDICINE
Payer: COMMERCIAL

## 2022-11-23 ENCOUNTER — TELEPHONE (OUTPATIENT)
Dept: OBGYN | Facility: CLINIC | Age: 37
End: 2022-11-23

## 2022-11-23 VITALS
SYSTOLIC BLOOD PRESSURE: 144 MMHG | HEART RATE: 78 BPM | RESPIRATION RATE: 10 BRPM | OXYGEN SATURATION: 96 % | DIASTOLIC BLOOD PRESSURE: 95 MMHG | TEMPERATURE: 98.4 F

## 2022-11-23 DIAGNOSIS — O13.9 GESTATIONAL HYPERTENSION AFFECTING FIRST PREGNANCY: Primary | ICD-10-CM

## 2022-11-23 DIAGNOSIS — I10 HYPERTENSION, UNSPECIFIED TYPE: ICD-10-CM

## 2022-11-23 DIAGNOSIS — G44.209 TENSION HEADACHE: ICD-10-CM

## 2022-11-23 LAB
ALBUMIN SERPL-MCNC: 2.8 G/DL (ref 3.4–5)
ALBUMIN SERPL-MCNC: 3 G/DL (ref 3.4–5)
ALBUMIN UR-MCNC: NEGATIVE MG/DL
ALP SERPL-CCNC: 110 U/L (ref 40–150)
ALP SERPL-CCNC: 111 U/L (ref 40–150)
ALT SERPL W P-5'-P-CCNC: 22 U/L (ref 0–50)
ALT SERPL W P-5'-P-CCNC: 26 U/L (ref 0–50)
ANION GAP SERPL CALCULATED.3IONS-SCNC: 3 MMOL/L (ref 3–14)
ANION GAP SERPL CALCULATED.3IONS-SCNC: 6 MMOL/L (ref 3–14)
APPEARANCE UR: CLEAR
AST SERPL W P-5'-P-CCNC: 26 U/L (ref 0–45)
AST SERPL W P-5'-P-CCNC: ABNORMAL U/L
BASOPHILS # BLD AUTO: 0 10E3/UL (ref 0–0.2)
BASOPHILS NFR BLD AUTO: 0 %
BILIRUB SERPL-MCNC: 0.3 MG/DL (ref 0.2–1.3)
BILIRUB SERPL-MCNC: 0.3 MG/DL (ref 0.2–1.3)
BILIRUB UR QL STRIP: NEGATIVE
BUN SERPL-MCNC: 10 MG/DL (ref 7–30)
BUN SERPL-MCNC: 10 MG/DL (ref 7–30)
CALCIUM SERPL-MCNC: 9.2 MG/DL (ref 8.5–10.1)
CALCIUM SERPL-MCNC: 9.4 MG/DL (ref 8.5–10.1)
CHLORIDE BLD-SCNC: 112 MMOL/L (ref 94–109)
CHLORIDE BLD-SCNC: 113 MMOL/L (ref 94–109)
CO2 SERPL-SCNC: 22 MMOL/L (ref 20–32)
CO2 SERPL-SCNC: 22 MMOL/L (ref 20–32)
COLOR UR AUTO: ABNORMAL
CREAT SERPL-MCNC: 0.48 MG/DL (ref 0.52–1.04)
CREAT SERPL-MCNC: 0.49 MG/DL (ref 0.52–1.04)
EOSINOPHIL # BLD AUTO: 0.4 10E3/UL (ref 0–0.7)
EOSINOPHIL NFR BLD AUTO: 5 %
ERYTHROCYTE [DISTWIDTH] IN BLOOD BY AUTOMATED COUNT: 12.4 % (ref 10–15)
GFR SERPL CREATININE-BSD FRML MDRD: >90 ML/MIN/1.73M2
GFR SERPL CREATININE-BSD FRML MDRD: >90 ML/MIN/1.73M2
GLUCOSE BLD-MCNC: 80 MG/DL (ref 70–99)
GLUCOSE BLD-MCNC: 80 MG/DL (ref 70–99)
GLUCOSE UR STRIP-MCNC: NEGATIVE MG/DL
HCT VFR BLD AUTO: 35.2 % (ref 35–47)
HGB BLD-MCNC: 11.8 G/DL (ref 11.7–15.7)
HGB UR QL STRIP: ABNORMAL
IMM GRANULOCYTES # BLD: 0 10E3/UL
IMM GRANULOCYTES NFR BLD: 0 %
KETONES UR STRIP-MCNC: NEGATIVE MG/DL
LEUKOCYTE ESTERASE UR QL STRIP: NEGATIVE
LYMPHOCYTES # BLD AUTO: 2.7 10E3/UL (ref 0.8–5.3)
LYMPHOCYTES NFR BLD AUTO: 34 %
MCH RBC QN AUTO: 30.2 PG (ref 26.5–33)
MCHC RBC AUTO-ENTMCNC: 33.5 G/DL (ref 31.5–36.5)
MCV RBC AUTO: 90 FL (ref 78–100)
MONOCYTES # BLD AUTO: 0.5 10E3/UL (ref 0–1.3)
MONOCYTES NFR BLD AUTO: 7 %
MUCOUS THREADS #/AREA URNS LPF: PRESENT /LPF
NEUTROPHILS # BLD AUTO: 4.4 10E3/UL (ref 1.6–8.3)
NEUTROPHILS NFR BLD AUTO: 54 %
NITRATE UR QL: NEGATIVE
NRBC # BLD AUTO: 0 10E3/UL
NRBC BLD AUTO-RTO: 0 /100
PH UR STRIP: 5.5 [PH] (ref 5–7)
PLATELET # BLD AUTO: 415 10E3/UL (ref 150–450)
POTASSIUM BLD-SCNC: 4.2 MMOL/L (ref 3.4–5.3)
POTASSIUM BLD-SCNC: 6.2 MMOL/L (ref 3.4–5.3)
PROT SERPL-MCNC: 7.1 G/DL (ref 6.8–8.8)
PROT SERPL-MCNC: 7.4 G/DL (ref 6.8–8.8)
RBC # BLD AUTO: 3.91 10E6/UL (ref 3.8–5.2)
RBC URINE: 2 /HPF
SODIUM SERPL-SCNC: 137 MMOL/L (ref 133–144)
SODIUM SERPL-SCNC: 141 MMOL/L (ref 133–144)
SP GR UR STRIP: 1.01 (ref 1–1.03)
SQUAMOUS EPITHELIAL: <1 /HPF
UROBILINOGEN UR STRIP-MCNC: NORMAL MG/DL
WBC # BLD AUTO: 8.1 10E3/UL (ref 4–11)
WBC URINE: 1 /HPF

## 2022-11-23 PROCEDURE — 85025 COMPLETE CBC W/AUTO DIFF WBC: CPT | Performed by: EMERGENCY MEDICINE

## 2022-11-23 PROCEDURE — 99214 OFFICE O/P EST MOD 30 MIN: CPT | Mod: 24 | Performed by: OBSTETRICS & GYNECOLOGY

## 2022-11-23 PROCEDURE — 250N000013 HC RX MED GY IP 250 OP 250 PS 637: Performed by: EMERGENCY MEDICINE

## 2022-11-23 PROCEDURE — 99283 EMERGENCY DEPT VISIT LOW MDM: CPT | Performed by: EMERGENCY MEDICINE

## 2022-11-23 PROCEDURE — 250N000013 HC RX MED GY IP 250 OP 250 PS 637: Performed by: OBSTETRICS & GYNECOLOGY

## 2022-11-23 PROCEDURE — 84155 ASSAY OF PROTEIN SERUM: CPT | Performed by: EMERGENCY MEDICINE

## 2022-11-23 PROCEDURE — 99284 EMERGENCY DEPT VISIT MOD MDM: CPT | Performed by: EMERGENCY MEDICINE

## 2022-11-23 PROCEDURE — 36415 COLL VENOUS BLD VENIPUNCTURE: CPT | Performed by: EMERGENCY MEDICINE

## 2022-11-23 PROCEDURE — 81003 URINALYSIS AUTO W/O SCOPE: CPT | Performed by: EMERGENCY MEDICINE

## 2022-11-23 RX ORDER — LABETALOL 200 MG/1
200 TABLET, FILM COATED ORAL 2 TIMES DAILY
Qty: 90 TABLET | Refills: 0 | Status: SHIPPED | OUTPATIENT
Start: 2022-11-23 | End: 2023-01-07

## 2022-11-23 RX ORDER — IBUPROFEN 600 MG/1
600 TABLET, FILM COATED ORAL EVERY 6 HOURS PRN
Status: DISCONTINUED | OUTPATIENT
Start: 2022-11-23 | End: 2022-11-23 | Stop reason: HOSPADM

## 2022-11-23 RX ORDER — DIPHENHYDRAMINE HCL 25 MG
25 CAPSULE ORAL ONCE
Status: COMPLETED | OUTPATIENT
Start: 2022-11-23 | End: 2022-11-23

## 2022-11-23 RX ORDER — LABETALOL HYDROCHLORIDE 5 MG/ML
10 INJECTION, SOLUTION INTRAVENOUS ONCE
Status: DISCONTINUED | OUTPATIENT
Start: 2022-11-23 | End: 2022-11-23

## 2022-11-23 RX ORDER — METOCLOPRAMIDE 5 MG/1
5 TABLET ORAL ONCE
Status: COMPLETED | OUTPATIENT
Start: 2022-11-23 | End: 2022-11-23

## 2022-11-23 RX ORDER — ACETAMINOPHEN 325 MG/1
650 TABLET ORAL EVERY 4 HOURS PRN
Status: DISCONTINUED | OUTPATIENT
Start: 2022-11-23 | End: 2022-11-23 | Stop reason: HOSPADM

## 2022-11-23 RX ADMIN — IBUPROFEN 600 MG: 600 TABLET ORAL at 16:21

## 2022-11-23 RX ADMIN — METOCLOPRAMIDE 5 MG: 5 TABLET ORAL at 14:28

## 2022-11-23 RX ADMIN — ACETAMINOPHEN 650 MG: 325 TABLET, FILM COATED ORAL at 15:00

## 2022-11-23 RX ADMIN — DIPHENHYDRAMINE HYDROCHLORIDE 25 MG: 25 CAPSULE ORAL at 14:23

## 2022-11-23 ASSESSMENT — ENCOUNTER SYMPTOMS
BACK PAIN: 0
VOMITING: 0
NAUSEA: 0
COUGH: 0
CONFUSION: 0
ABDOMINAL PAIN: 0
EYE REDNESS: 0
SHORTNESS OF BREATH: 0
DIARRHEA: 0
HEADACHES: 1
SEIZURES: 0
DIFFICULTY URINATING: 0
FEVER: 0

## 2022-11-23 ASSESSMENT — ACTIVITIES OF DAILY LIVING (ADL)
ADLS_ACUITY_SCORE: 35
ADLS_ACUITY_SCORE: 33

## 2022-11-23 NOTE — TELEPHONE ENCOUNTER
Discussed with patient recommendation again. Patient upset about having to go to the ED, with a one week old, but in agreement of plan.     Call made to Charge Nurse in ED to make them aware of patient coming.     Dr. Snyder aware patient will be coming to the ED.    Labs completed and stable.   Refill sent.     Calrey Whitmore RN  Cardiology Care Coordinator  [facility]  Phone: [phone]

## 2022-11-23 NOTE — TELEPHONE ENCOUNTER
If she's had elevated BP and HA despite OTC meds, needs to be seen in the ED for post-partum pre-e eval.  Recommend paging on call Dr. Claudio COLLIER.    Iris Becerril MD

## 2022-11-23 NOTE — CONSULTS
"Gynecology Consult Note    Consulting Service: ED  Reason for Consult: Postpartum MURRY    HPI:   Meagan Mcgowan is a 37 year old  who is POD#8 s/p RLTCS who presents to the ED for elevated home BP and HA. She has a history of GHTN in her first pregnancy. After discharge has been checking BP and had a a few MR BP. She also had a HA that started yesterday. It occurred in setting of not taking allergy meds and not having humidifier on. It went away but came back this morning. She did take her allergy meds and had humidifier on but HA did not go away. She has been taking tyelnol and ibuprofen for pain related to CS, this has not helped with HA. She usually drinks coffee. Today she has \"not drank enough.\"    ROS:   Negative except as per HPI    OB History:       PMH:  Past Medical History:   Diagnosis Date     Abnormal Pap smear of cervix      Anemia      Mild asthma        PSHx:   Past Surgical History:   Procedure Laterality Date      SECTION N/A 2021    Procedure:  SECTION;  Surgeon: Julissa Garcia MD;  Location: [facility]      SECTION N/A 11/15/2022    Procedure:  SECTION;  Surgeon: Morelia Lira MD;  Location: [facility]     wisdom teeth         Medications:  Current Outpatient Medications   Medication Instructions     acetaminophen (TYLENOL) 650 mg, Oral, EVERY 6 HOURS PRN, Start after Delivery.     butalbital-acetaminophen-caffeine (ESGIC) -40 MG tablet 1 tablet, Oral, EVERY 4 HOURS PRN     Cetirizine HCl (ZYRTEC PO) Oral     cholecalciferol (VITAMIN D3) 25 mcg (1000 units) capsule 3 capsules, Oral, DAILY     fexofenadine (ALLEGRA) 30 mg, Oral, 2 TIMES DAILY     hydrOXYzine (VISTARIL)  mg, Oral, AT BEDTIME PRN     ibuprofen (ADVIL/MOTRIN) 600 mg, Oral, EVERY 6 HOURS PRN, Start after delivery     labetalol (NORMODYNE) 200 mg, Oral, 2 TIMES DAILY     norethindrone (MICRONOR) 0.35 mg, Oral, DAILY     oxyCODONE (ROXICODONE) 5 mg, Oral, EVERY 6 " HOURS PRN     Prenatal Vit-Fe Fumarate-FA (PRENATAL MULTIVITAMIN W/IRON) 27-0.8 MG tablet 1 tablet, Oral, DAILY     Probiotic Product (PROBIOTIC-10) CHEW Oral     senna-docusate (SENOKOT-S/PERICOLACE) 8.6-50 MG tablet 1 tablet, Oral, DAILY, Start after delivery.       Allergies:    No Known Allergies    Social History:   Social History     Tobacco Use     Smoking status: Never     Smokeless tobacco: Never   Substance Use Topics     Alcohol use: Not Currently     Drug use: Never       Physical Exam:   Patient Vitals for the past 24 hrs:   BP Temp Temp src Pulse Resp SpO2   11/23/22 1400 (!) 144/95 -- -- 78 10 96 %   11/23/22 1218 (!) 149/102 98.4  F (36.9  C) Oral 89 16 97 %   Additional MR and HMR BPs but these were not charted    Gen:  Resting comfortably, NAD  CV:  Well perfused, RR  Pulm:  NWOB, CTAB  Abd:  Soft, non-tender, non-distended, incision c/d/i, uterus firm below umbilicus and non-tender  Ext:  Non-tender, trace LE edema b/l, 2+ patellar reflexes b/l    Labs:  Results for orders placed or performed during the hospital encounter of 11/23/22   Comprehensive metabolic panel     Status: Abnormal   Result Value Ref Range    Sodium 137 133 - 144 mmol/L    Potassium 6.2 (HH) 3.4 - 5.3 mmol/L    Chloride 112 (H) 94 - 109 mmol/L    Carbon Dioxide (CO2) 22 20 - 32 mmol/L    Anion Gap 3 3 - 14 mmol/L    Urea Nitrogen 10 7 - 30 mg/dL    Creatinine 0.48 (L) 0.52 - 1.04 mg/dL    Calcium 9.2 8.5 - 10.1 mg/dL    Glucose 80 70 - 99 mg/dL    Alkaline Phosphatase 110 40 - 150 U/L    AST      ALT 26 0 - 50 U/L    Protein Total 7.4 6.8 - 8.8 g/dL    Albumin 2.8 (L) 3.4 - 5.0 g/dL    Bilirubin Total 0.3 0.2 - 1.3 mg/dL    GFR Estimate >90 >60 mL/min/1.73m2   UA with Microscopic reflex to Culture     Status: Abnormal    Specimen: Urine, Midstream   Result Value Ref Range    Color Urine Straw Colorless, Straw, Light Yellow, Yellow    Appearance Urine Clear Clear    Glucose Urine Negative Negative mg/dL    Bilirubin Urine  Negative Negative    Ketones Urine Negative Negative mg/dL    Specific Gravity Urine 1.006 1.003 - 1.035    Blood Urine Small (A) Negative    pH Urine 5.5 5.0 - 7.0    Protein Albumin Urine Negative Negative mg/dL    Urobilinogen Urine Normal Normal, 2.0 mg/dL    Nitrite Urine Negative Negative    Leukocyte Esterase Urine Negative Negative    Mucus Urine Present (A) None Seen /LPF    RBC Urine 2 <=2 /HPF    WBC Urine 1 <=5 /HPF    Squamous Epithelials Urine <1 <=1 /HPF    Narrative    Urine Culture not indicated   CBC with platelets and differential     Status: None   Result Value Ref Range    WBC Count 8.1 4.0 - 11.0 10e3/uL    RBC Count 3.91 3.80 - 5.20 10e6/uL    Hemoglobin 11.8 11.7 - 15.7 g/dL    Hematocrit 35.2 35.0 - 47.0 %    MCV 90 78 - 100 fL    MCH 30.2 26.5 - 33.0 pg    MCHC 33.5 31.5 - 36.5 g/dL    RDW 12.4 10.0 - 15.0 %    Platelet Count 415 150 - 450 10e3/uL    % Neutrophils 54 %    % Lymphocytes 34 %    % Monocytes 7 %    % Eosinophils 5 %    % Basophils 0 %    % Immature Granulocytes 0 %    NRBCs per 100 WBC 0 <1 /100    Absolute Neutrophils 4.4 1.6 - 8.3 10e3/uL    Absolute Lymphocytes 2.7 0.8 - 5.3 10e3/uL    Absolute Monocytes 0.5 0.0 - 1.3 10e3/uL    Absolute Eosinophils 0.4 0.0 - 0.7 10e3/uL    Absolute Basophils 0.0 0.0 - 0.2 10e3/uL    Absolute Immature Granulocytes 0.0 <=0.4 10e3/uL    Absolute NRBCs 0.0 10e3/uL   Comprehensive metabolic panel     Status: Abnormal   Result Value Ref Range    Sodium 141 133 - 144 mmol/L    Potassium 4.2 3.4 - 5.3 mmol/L    Chloride 113 (H) 94 - 109 mmol/L    Carbon Dioxide (CO2) 22 20 - 32 mmol/L    Anion Gap 6 3 - 14 mmol/L    Urea Nitrogen 10 7 - 30 mg/dL    Creatinine 0.49 (L) 0.52 - 1.04 mg/dL    Calcium 9.4 8.5 - 10.1 mg/dL    Glucose 80 70 - 99 mg/dL    Alkaline Phosphatase 111 40 - 150 U/L    AST 26 0 - 45 U/L    ALT 22 0 - 50 U/L    Protein Total 7.1 6.8 - 8.8 g/dL    Albumin 3.0 (L) 3.4 - 5.0 g/dL    Bilirubin Total 0.3 0.2 - 1.3 mg/dL    GFR  Estimate >90 >60 mL/min/1.73m2   CBC with platelets differential     Status: None    Narrative    The following orders were created for panel order CBC with platelets differential.  Procedure                               Abnormality         Status                     ---------                               -----------         ------                     CBC with platelets and d...[[id number]]                      Final result                 Please view results for these tests on the individual orders.     A&P:   Meagan Mcgowan is a 37 year old  with history of gestational HTN who is POD#8 s/p RLTCS and presents to the ED with elevated BP and HA.     In the ED she was given Reglan, Benadryl, and coffee and her HA significantly improved to a /10. Her labs were normal (note her first CMP was hemolyzed and had elevated K).  She did continued to have MR BP in the ED, ruling in for GHTN.    GHTN, postpartum  - Not meeting criteria for PreE w/ SF   - MR and HMR BP in ED  - Start Labetalol 200 BID (Nifediepine avoided 2/2 current HA)  - Continue home BP monitoring. Call or come to ED with BP >150/100  - Follow up  as scheduled with Dr. Lira    Thank you for this consult.  Please do not hesitate to contact us with concerns or questions.       Patient seen and discussed with Dr. Florence Snyder.    George Pena MD MPH  OB/Gyn PGY-4  22 5:14 PM    Physician Attestation   I personally examined and evaluated this patient.  I discussed the patient with the resident/fellow and care team, and agree with the assessment and plan of care as documented in the note of 22.      I personally reviewed vital signs, medications and labs.    Gutierrez findings: Meagan Mcgowan is a 37 year old  s/p RLTCS on 11/15/22 who presents with headache that did not improve with tylenol and ibuprofen at home.  BPs on home checks were mild range.  BPs while monitoring here were mild range.  HELLP labs WNL.  She was treated  with reglan, benadryl, and drank some coffee with significant improvement in headache to a 1/10 level.  Patient did not meet criteria for pre-e w/ SF, thus was discharged to home.  Plan to start labetalol 100mg BID and if still mild range BPs will increase.  Patient instructed to take home BPs and send via [employer]t to clinic.    Florence Snyder MD  Date of Service (when I saw the patient): 11/23/22

## 2022-11-23 NOTE — TELEPHONE ENCOUNTER
Patient not wanting to go to the Emergency Department given her history.     2021: had post partum hypertension, negative for pre-eclampsia marker.     She is requesting to come to clinic and get labs drawn and avoid the Emergency Department.     Please advise, thanks!     Please see Dr. Snyder's recommendation:  Please let the patient know I strongly recommend evaluation in the emergency department.  With mild range blood pressures at home and headache that does not improve with tylenol and ibuprofen the concern is pre-eclampsia with severe features.  In order to properly evaluate for pre-eclampsia with severe features she should have a couple of hours of blood pressure monitoring (typically checked every 15 minutes for about 2 hours) as well as lab work up (CBC, AST, ALT, creatinine) during that time.  If based on headache criteria (can be considered a severe feature), lab, or BP criteria she meets the diagnosis of pre-eclampsia with severe features we would recommend inpatient admission for IV magnesium for 24 hours and start antihypertensive medications as needed.  Risks of pre-eclampsia with severe features include seizure (IV magnesium decreases that risk)  and stroke (if BP are uncontrolled.  A work up in the clinic is a sub-optimal evaluation and not the standard of care.  This is because we are not able to get lab results while she is being evaluated in clinic and we do not have the ability to perform prolonged BP monitoring in clinic.

## 2022-11-23 NOTE — TELEPHONE ENCOUNTER
RN called and spoke with pt, had  on 11/15.    Pt was advised to monitor BP at home, had PP HTN with first pregnancy.    Pt had HA for 2 days, was resolved yesterday, started again this morning despite being on tylenol and ibuprofen.    BP -140/84  BP -134/87  BP today -138/87    Pt denies vision changes or abdominal pain with headaches. Pt not on any medications for BP.    RN routing to provider for recommendations on f/u. Pt aware when to be seen in ED for higher Bps.    Beryl Murray RN on 2022 at 9:55 AM

## 2022-11-23 NOTE — ED PROVIDER NOTES
[facility] EMERGENCY DEPARTMENT ([facility])  22    History     Chief Complaint   Patient presents with     Hypertension     8 days post partum, 138/87 0900 at home     Headache     Since 0200, described as strong     HPI  Meagan Mcgowan is a 37 year old female who has a history of asthma, anemia, gestational hypertension, and s/p second  (11/15/22) presents to the ED with onset of headache and noted to have high blood pressure in the high of 140s over 100s.  Patient reports that with her previous pregnancy she had a similar episode approximately 3 weeks after delivery where she had hypertension.  Currently, she reports that she has been doing well up until yesterday and she developed a headache but it went away.  It returned around 2 AM, describes it as frontal pressure.  No photophobia, phonophobia, or nausea/vomiting.  She has not on any antihypertensives.  She denies any vision changes.  She states that she contacted the OB line and was referred to the emergency department for further evaluation.  As far as her  incision, she reports that she has not had any drainage, is still having a small amount of bloody vaginal discharge but nothing foul-smelling, no other abnormal discharge.  Pain is appropriately managed with a mixture of ibuprofen and Tylenol.  That pain regimen has not improved her headache she reports.      Past Medical History  Past Medical History:   Diagnosis Date     Abnormal Pap smear of cervix      Anemia      Mild asthma      Past Surgical History:   Procedure Laterality Date      SECTION N/A 2021    Procedure:  SECTION;  Surgeon: Julissa Garcia MD;  Location: [facility]      SECTION N/A 11/15/2022    Procedure:  SECTION;  Surgeon: Morelia Lira MD;  Location: [facility]     wisdom teeth       acetaminophen (TYLENOL) 325 MG tablet  fexofenadine (ALLEGRA) 30 MG ODT  ibuprofen (ADVIL/MOTRIN) 200 MG tablet  labetalol  (NORMODYNE) 200 MG tablet  Prenatal Vit-Fe Fumarate-FA (PRENATAL MULTIVITAMIN W/IRON) 27-0.8 MG tablet  senna-docusate (SENOKOT-S/PERICOLACE) 8.6-50 MG tablet  butalbital-acetaminophen-caffeine (ESGIC) -40 MG tablet  Cetirizine HCl (ZYRTEC PO)  cholecalciferol (VITAMIN D3) 25 mcg (1000 units) capsule  hydrOXYzine (VISTARIL) 50 MG capsule  norethindrone (MICRONOR) 0.35 MG tablet  oxyCODONE (ROXICODONE) 5 MG tablet  Probiotic Product (PROBIOTIC-10) CHEW      No Known Allergies  Family History  Family History   Problem Relation Age of Onset     Asthma Mother      Tongue cancer Father      Hypertension Father      Diabetes Paternal Grandmother      Heart Failure Paternal Grandmother      Childhood Heart Disease Brother      Social History   Social History     Tobacco Use     Smoking status: Never     Smokeless tobacco: Never   Substance Use Topics     Alcohol use: Not Currently     Drug use: Never      Past medical history, past surgical history, medications, allergies, family history, and social history were reviewed with the patient. No additional pertinent items.       Review of Systems   Constitutional: Negative for fever.   HENT: Negative for congestion.    Eyes: Negative for redness.   Respiratory: Negative for cough and shortness of breath.    Cardiovascular: Negative for chest pain.   Gastrointestinal: Negative for abdominal pain, diarrhea, nausea and vomiting.   Genitourinary: Negative for difficulty urinating.   Musculoskeletal: Negative for back pain.   Skin: Positive for rash.   Neurological: Positive for headaches. Negative for seizures.   Psychiatric/Behavioral: Negative for confusion.     A complete review of systems was performed with pertinent positives and negatives noted in the HPI, and all other systems negative.    Physical Exam   BP: (!) 149/102  Pulse: 89  Temp: 98.4  F (36.9  C)  Resp: 16  SpO2: 97 %  Physical Exam  Constitutional:       General: She is awake. She is not in acute  distress.     Appearance: Normal appearance. She is well-developed. She is not ill-appearing or toxic-appearing.   HENT:      Head: Atraumatic.      Mouth/Throat:      Pharynx: No oropharyngeal exudate.   Eyes:      General: No scleral icterus.     Pupils: Pupils are equal, round, and reactive to light.   Cardiovascular:      Rate and Rhythm: Normal rate and regular rhythm.      Heart sounds: Normal heart sounds, S1 normal and S2 normal.   Pulmonary:      Effort: No respiratory distress.      Breath sounds: Normal breath sounds.   Abdominal:      General: Bowel sounds are normal.      Palpations: Abdomen is soft.      Tenderness: There is no abdominal tenderness.   Musculoskeletal:         General: No tenderness.   Skin:     General: Skin is warm.      Findings: Erythema and rash present.      Comments: Splotchy areas of erythema to anterior superior and middle chest/inferior neck.   Neurological:      Mental Status: She is alert and oriented to person, place, and time.      Cranial Nerves: Cranial nerves 2-12 are intact.      Sensory: Sensation is intact.      Motor: Motor function is intact.   Psychiatric:         Attention and Perception: Attention normal.         Mood and Affect: Mood normal.         Speech: Speech normal.         Behavior: Behavior normal. Behavior is cooperative.         ED Course      Procedures                     Results for orders placed or performed during the hospital encounter of 11/23/22   Comprehensive metabolic panel     Status: Abnormal   Result Value Ref Range    Sodium 137 133 - 144 mmol/L    Potassium 6.2 (HH) 3.4 - 5.3 mmol/L    Chloride 112 (H) 94 - 109 mmol/L    Carbon Dioxide (CO2) 22 20 - 32 mmol/L    Anion Gap 3 3 - 14 mmol/L    Urea Nitrogen 10 7 - 30 mg/dL    Creatinine 0.48 (L) 0.52 - 1.04 mg/dL    Calcium 9.2 8.5 - 10.1 mg/dL    Glucose 80 70 - 99 mg/dL    Alkaline Phosphatase 110 40 - 150 U/L    AST      ALT 26 0 - 50 U/L    Protein Total 7.4 6.8 - 8.8 g/dL    Albumin  2.8 (L) 3.4 - 5.0 g/dL    Bilirubin Total 0.3 0.2 - 1.3 mg/dL    GFR Estimate >90 >60 mL/min/1.73m2   UA with Microscopic reflex to Culture     Status: Abnormal    Specimen: Urine, Midstream   Result Value Ref Range    Color Urine Straw Colorless, Straw, Light Yellow, Yellow    Appearance Urine Clear Clear    Glucose Urine Negative Negative mg/dL    Bilirubin Urine Negative Negative    Ketones Urine Negative Negative mg/dL    Specific Gravity Urine 1.006 1.003 - 1.035    Blood Urine Small (A) Negative    pH Urine 5.5 5.0 - 7.0    Protein Albumin Urine Negative Negative mg/dL    Urobilinogen Urine Normal Normal, 2.0 mg/dL    Nitrite Urine Negative Negative    Leukocyte Esterase Urine Negative Negative    Mucus Urine Present (A) None Seen /LPF    RBC Urine 2 <=2 /HPF    WBC Urine 1 <=5 /HPF    Squamous Epithelials Urine <1 <=1 /HPF    Narrative    Urine Culture not indicated   CBC with platelets and differential     Status: None   Result Value Ref Range    WBC Count 8.1 4.0 - 11.0 10e3/uL    RBC Count 3.91 3.80 - 5.20 10e6/uL    Hemoglobin 11.8 11.7 - 15.7 g/dL    Hematocrit 35.2 35.0 - 47.0 %    MCV 90 78 - 100 fL    MCH 30.2 26.5 - 33.0 pg    MCHC 33.5 31.5 - 36.5 g/dL    RDW 12.4 10.0 - 15.0 %    Platelet Count 415 150 - 450 10e3/uL    % Neutrophils 54 %    % Lymphocytes 34 %    % Monocytes 7 %    % Eosinophils 5 %    % Basophils 0 %    % Immature Granulocytes 0 %    NRBCs per 100 WBC 0 <1 /100    Absolute Neutrophils 4.4 1.6 - 8.3 10e3/uL    Absolute Lymphocytes 2.7 0.8 - 5.3 10e3/uL    Absolute Monocytes 0.5 0.0 - 1.3 10e3/uL    Absolute Eosinophils 0.4 0.0 - 0.7 10e3/uL    Absolute Basophils 0.0 0.0 - 0.2 10e3/uL    Absolute Immature Granulocytes 0.0 <=0.4 10e3/uL    Absolute NRBCs 0.0 10e3/uL   Comprehensive metabolic panel     Status: Abnormal   Result Value Ref Range    Sodium 141 133 - 144 mmol/L    Potassium 4.2 3.4 - 5.3 mmol/L    Chloride 113 (H) 94 - 109 mmol/L    Carbon Dioxide (CO2) 22 20 - 32 mmol/L     Anion Gap 6 3 - 14 mmol/L    Urea Nitrogen 10 7 - 30 mg/dL    Creatinine 0.49 (L) 0.52 - 1.04 mg/dL    Calcium 9.4 8.5 - 10.1 mg/dL    Glucose 80 70 - 99 mg/dL    Alkaline Phosphatase 111 40 - 150 U/L    AST 26 0 - 45 U/L    ALT 22 0 - 50 U/L    Protein Total 7.1 6.8 - 8.8 g/dL    Albumin 3.0 (L) 3.4 - 5.0 g/dL    Bilirubin Total 0.3 0.2 - 1.3 mg/dL    GFR Estimate >90 >60 mL/min/1.73m2   CBC with platelets differential     Status: None    Narrative    The following orders were created for panel order CBC with platelets differential.  Procedure                               Abnormality         Status                     ---------                               -----------         ------                     CBC with platelets and d...[[id number]]                      Final result                 Please view results for these tests on the individual orders.     Medications   acetaminophen (TYLENOL) tablet 650 mg (650 mg Oral Given 22 1500)   ibuprofen (ADVIL/MOTRIN) tablet 600 mg (600 mg Oral Given 22 1621)   metoclopramide (REGLAN) tablet 5 mg (5 mg Oral Given 22 1428)   diphenhydrAMINE (BENADRYL) capsule 25 mg (25 mg Oral Given 22 1423)        Assessments & Plan (with Medical Decision Making)   Meagan Mcgowan is a 37 year old female who has a history of asthma, anemia, gestational hypertension, and s/p second  (11/15/22) presents to the ED with onset of headache and noted to have high blood pressure in the high of 140s over 100s.  Will consult OB/GYN, appreciate their recommendations.    I have reviewed the nursing notes. I have reviewed the findings, diagnosis, plan and need for follow up with the patient.    OB/GYN are recommending Reglan and Benadryl along with observation.  No we will provide further recommendations when available.  We will plan to reassess the patient.  Labs unrevealing and reassuring.    On reassessment, her headache is significantly improved and her blood  pressure has improved as well in my discussion with OB/GYN.  She is cleared to be discharged home, OB/GYN will start her on labetalol.  Close return precautions given.  Okay to discharge.    Discharge Medication List as of 11/23/2022  4:16 PM      START taking these medications    Details   labetalol (NORMODYNE) 200 MG tablet Take 1 tablet (200 mg) by mouth 2 times daily for 45 days, Disp-90 tablet, R-0, E-Prescribe             Final diagnoses:   Tension headache   Hypertension, unspecified type       --  Mejia Casillas MD PhD  [facility] EMERGENCY DEPARTMENT  11/23/2022     Pool Casillas MD  11/23/22 [id number]

## 2022-11-29 ENCOUNTER — TELEPHONE (OUTPATIENT)
Dept: OBGYN | Facility: CLINIC | Age: 37
End: 2022-11-29

## 2022-11-29 NOTE — TELEPHONE ENCOUNTER
Returned patient call.  Patient noticed a small amount clear/serous tint/pink discharge from incision yesterday.  Steri strips still on.  No odor, No fever, No redness  No change in general soreness of incision    Appointment 12/1/22     Advised patient to continue to monitor drainage from home. Reminder her to keep it clean and dry as much as possible.   Patient will call with any changes in status between now and appt on 12/1.    Dodie Elam RN

## 2022-12-01 ENCOUNTER — OFFICE VISIT (OUTPATIENT)
Dept: OBGYN | Facility: CLINIC | Age: 37
End: 2022-12-01
Payer: COMMERCIAL

## 2022-12-01 VITALS
SYSTOLIC BLOOD PRESSURE: 145 MMHG | WEIGHT: 158 LBS | TEMPERATURE: 97.5 F | BODY MASS INDEX: 27.12 KG/M2 | HEART RATE: 74 BPM | DIASTOLIC BLOOD PRESSURE: 86 MMHG

## 2022-12-01 DIAGNOSIS — R10.2 SUPRAPUBIC PAIN: ICD-10-CM

## 2022-12-01 DIAGNOSIS — L92.9 GRANULATION TISSUE OF SKIN: ICD-10-CM

## 2022-12-01 DIAGNOSIS — O13.9 GESTATIONAL HYPERTENSION, ANTEPARTUM: Primary | ICD-10-CM

## 2022-12-01 LAB
ALBUMIN UR-MCNC: NEGATIVE MG/DL
APPEARANCE UR: CLEAR
BACTERIA #/AREA URNS HPF: ABNORMAL /HPF
BILIRUB UR QL STRIP: NEGATIVE
COLOR UR AUTO: YELLOW
GLUCOSE UR STRIP-MCNC: NEGATIVE MG/DL
HGB UR QL STRIP: ABNORMAL
KETONES UR STRIP-MCNC: NEGATIVE MG/DL
LEUKOCYTE ESTERASE UR QL STRIP: ABNORMAL
NITRATE UR QL: NEGATIVE
PH UR STRIP: 5.5 [PH] (ref 5–7)
RBC #/AREA URNS AUTO: ABNORMAL /HPF
SP GR UR STRIP: 1.02 (ref 1–1.03)
SQUAMOUS #/AREA URNS AUTO: ABNORMAL /LPF
UROBILINOGEN UR STRIP-ACNC: 0.2 E.U./DL
WBC #/AREA URNS AUTO: ABNORMAL /HPF

## 2022-12-01 PROCEDURE — 99213 OFFICE O/P EST LOW 20 MIN: CPT | Performed by: OBSTETRICS & GYNECOLOGY

## 2022-12-01 PROCEDURE — 81001 URINALYSIS AUTO W/SCOPE: CPT | Performed by: OBSTETRICS & GYNECOLOGY

## 2022-12-01 NOTE — PROGRESS NOTES
"  Assessment & Plan     Gestational hypertension, antepartum  Continue labetalol  Reviewed precautions  RTC 2 weeks    Suprapubic pain  Likely normal surgical healing. Will r/o UTI.   - UA with Microscopic reflex to Culture - lab collect  - Urine Microscopic    Granulation tissue of skin  Discussed continue secondary wound healing, appears healthy tissue.       Review of the result(s) of each unique test - UA  Ordering of each unique test         BMI:   Estimated body mass index is 27.12 kg/m  as calculated from the following:    Height as of 22: 1.626 m (5' 4\").    Weight as of this encounter: 71.7 kg (158 lb).           No follow-ups on file.    Morelia Lira MD  [facility]    Trisha Rhodes is a 37 year old accompanied by her spouse and infant, presenting for the following health issues:  2WK PP check-up      HPI   Doing ok  CS on 11/15/22, scheduled repeat.   H/o gestational hypertension. Had HA and called in, was sent to ER. Mild range BPs, negative labs. Started labetalol 200 mg BID. BPs at home are mostly 130s/80s, sometimes low 140s/90s right before she takes meds.     Notes some oozing from incision. No fevers.    Some suprapubic pain at midline. No flank pain. No longer taking ibuprofen or acetaminophen.    Past Medical History:   Diagnosis Date     Abnormal Pap smear of cervix      Anemia      Mild asthma        Past Surgical History:   Procedure Laterality Date      SECTION N/A 2021    Procedure:  SECTION;  Surgeon: Julissa Garcia MD;  [facility]: [facility]      SECTION N/A 11/15/2022    Procedure:  SECTION;  Surgeon: Morelia Lira MD;  Location: [facility] L+[facility]     wisdom teeth         Family History   Problem Relation Age of Onset     Asthma Mother      Tongue cancer Father      Hypertension Father      Diabetes Paternal Grandmother      Heart Failure Paternal Grandmother      Childhood Heart Disease Brother  "       Social History     Socioeconomic History     Marital status:      Spouse name: Not on file     Number of children: Not on file     Years of education: Not on file     Highest education level: Not on file   Occupational History     Not on file   Tobacco Use     Smoking status: Never     Smokeless tobacco: Never   Substance and Sexual Activity     Alcohol use: Not Currently     Drug use: Never     Sexual activity: Yes     Partners: Male   Other Topics Concern     Not on file   Social History Narrative     Not on file     Social Determinants of Health     Financial Resource Strain: Not on file   Food Insecurity: Not on file   Transportation Needs: Not on file   Physical Activity: Not on file   Stress: Not on file   Social Connections: Not on file   Intimate Partner Violence: Not on file   Housing Stability: Not on file       Current Outpatient Medications   Medication     butalbital-acetaminophen-caffeine (ESGIC) -40 MG tablet     Cetirizine HCl (ZYRTEC PO)     cholecalciferol (VITAMIN D3) 25 mcg (1000 units) capsule     fexofenadine (ALLEGRA) 30 MG ODT     hydrOXYzine (VISTARIL) 50 MG capsule     labetalol (NORMODYNE) 200 MG tablet     norethindrone (MICRONOR) 0.35 MG tablet     oxyCODONE (ROXICODONE) 5 MG tablet     Prenatal Vit-Fe Fumarate-FA (PRENATAL MULTIVITAMIN W/IRON) 27-0.8 MG tablet     Probiotic Product (PROBIOTIC-10) CHEW     No current facility-administered medications for this visit.        No Known Allergies      Review of Systems   Constitutional, HEENT, cardiovascular, pulmonary, gi and gu systems are negative, except as otherwise noted.      Objective    BP (!) 145/86   Pulse 74   Temp 97.5  F (36.4  C)   Wt 71.7 kg (158 lb)   LMP 03/10/2022 (Approximate)   Breastfeeding Yes   BMI 27.12 kg/m    Body mass index is 27.12 kg/m .  Physical Exam   GENERAL: healthy, alert and no distress  ABDOMEN: soft, nontender, no hepatosplenomegaly, no masses and bowel sounds normal. Mild  tenderness at midline.   MS: no gross musculoskeletal defects noted, no edema  PSYCH: mentation appears normal, affect normal/bright  SKIN: incision with 3 small 2-3 mm separations with normal granulation tissue.     Results for orders placed or performed in visit on 12/01/22 (from the past 24 hour(s))   UA with Microscopic reflex to Culture - lab collect    Specimen: Urine, Midstream   Result Value Ref Range    Color Urine Yellow Colorless, Straw, Light Yellow, Yellow    Appearance Urine Clear Clear    Glucose Urine Negative Negative mg/dL    Bilirubin Urine Negative Negative    Ketones Urine Negative Negative mg/dL    Specific Gravity Urine 1.025 1.003 - 1.035    Blood Urine Moderate (A) Negative    pH Urine 5.5 5.0 - 7.0    Protein Albumin Urine Negative Negative mg/dL    Urobilinogen Urine 0.2 0.2, 1.0 E.U./dL    Nitrite Urine Negative Negative    Leukocyte Esterase Urine Trace (A) Negative   Urine Microscopic   Result Value Ref Range    Bacteria Urine Few (A) None Seen /HPF    RBC Urine 0-2 0-2 /HPF /HPF    WBC Urine 0-5 0-5 /HPF /HPF    Squamous Epithelials Urine Few (A) None Seen /LPF    Narrative    Urine Culture not indicated

## 2022-12-04 ENCOUNTER — HEALTH MAINTENANCE LETTER (OUTPATIENT)
Age: 37
End: 2022-12-04

## 2022-12-15 ENCOUNTER — OFFICE VISIT (OUTPATIENT)
Dept: OBGYN | Facility: CLINIC | Age: 37
End: 2022-12-15
Payer: COMMERCIAL

## 2022-12-15 VITALS
TEMPERATURE: 97.5 F | HEART RATE: 79 BPM | BODY MASS INDEX: 26.95 KG/M2 | DIASTOLIC BLOOD PRESSURE: 91 MMHG | SYSTOLIC BLOOD PRESSURE: 133 MMHG | WEIGHT: 157 LBS

## 2022-12-15 DIAGNOSIS — O13.9 GESTATIONAL HYPERTENSION, ANTEPARTUM: Primary | ICD-10-CM

## 2022-12-15 DIAGNOSIS — E04.9 ENLARGED THYROID: ICD-10-CM

## 2022-12-15 DIAGNOSIS — N89.8 VAGINAL ITCHING: ICD-10-CM

## 2022-12-15 DIAGNOSIS — N95.2 VAGINAL ATROPHY: ICD-10-CM

## 2022-12-15 LAB
CLUE CELLS: ABNORMAL
TRICHOMONAS, WET PREP: ABNORMAL
TSH SERPL DL<=0.005 MIU/L-ACNC: 0.84 UIU/ML (ref 0.3–4.2)
WBC'S/HIGH POWER FIELD, WET PREP: ABNORMAL
YEAST, WET PREP: ABNORMAL

## 2022-12-15 PROCEDURE — 87210 SMEAR WET MOUNT SALINE/INK: CPT | Performed by: OBSTETRICS & GYNECOLOGY

## 2022-12-15 PROCEDURE — 36415 COLL VENOUS BLD VENIPUNCTURE: CPT | Performed by: OBSTETRICS & GYNECOLOGY

## 2022-12-15 PROCEDURE — 84443 ASSAY THYROID STIM HORMONE: CPT | Performed by: OBSTETRICS & GYNECOLOGY

## 2022-12-15 PROCEDURE — 99213 OFFICE O/P EST LOW 20 MIN: CPT | Mod: 24 | Performed by: OBSTETRICS & GYNECOLOGY

## 2022-12-15 RX ORDER — ESTRADIOL 0.1 MG/G
CREAM VAGINAL
Qty: 42.5 G | Refills: 0 | Status: SHIPPED | OUTPATIENT
Start: 2022-12-15

## 2022-12-15 ASSESSMENT — PATIENT HEALTH QUESTIONNAIRE - PHQ9: SUM OF ALL RESPONSES TO PHQ QUESTIONS 1-9: 0

## 2022-12-15 NOTE — PROGRESS NOTES
"  Assessment & Plan     Gestational hypertension, antepartum  Resolved.   Continue to check intermittently     Vaginal itching  Negative.   Presumed from vaginal atrophy  - Wet prep - Clinic Collect    Vaginal atrophy  Discussed new findings, lactational.   Recommend topical estrogen.   - estradiol (ESTRACE) 0.1 MG/GM vaginal cream; Place 1 gram vaginally daily for two weeks then twice weekly    Enlarged thyroid  Discussed possible postpartum thyroiditis. Will check TSH  Emollients for skin   - TSH with free T4 reflex    Review of the result(s) of each unique test - wet prep  Ordering of each unique test  Prescription drug management         BMI:   Estimated body mass index is 26.95 kg/m  as calculated from the following:    Height as of 22: 1.626 m (5' 4\").    Weight as of this encounter: 71.2 kg (157 lb).           No follow-ups on file.    Morelia Lira MD  [facility] is a 37 year old accompanied by her spouse, presenting for the following health issues:  Prenatal Care      HPI   F/u gestational hypertension, incision check.   Last seen .   Stopped labetalol last week.   BPs have been better at home than here in clinic today.   Having itching all over face and chest. Some vaginal dryness/itching.   Having still abdominal tenderness around incision and a little to left of it.     Past Medical History:   Diagnosis Date     Abnormal Pap smear of cervix      Anemia      Mild asthma        Past Surgical History:   Procedure Laterality Date      SECTION N/A 2021    Procedure:  SECTION;  Surgeon: Julissa Garcia MD;  [facility]      SECTION N/A 11/15/2022    Procedure:  SECTION;  Surgeon: Morelia Lira MD;  [facility]     wisdom teeth         Family History   Problem Relation Age of Onset     Asthma Mother      Tongue cancer Father      Hypertension Father      Diabetes Paternal " Grandmother      Heart Failure Paternal Grandmother      Childhood Heart Disease Brother        Social History     Socioeconomic History     Marital status:      Spouse name: Not on file     Number of children: Not on file     Years of education: Not on file     Highest education level: Not on file   Occupational History     Not on file   Tobacco Use     Smoking status: Never     Smokeless tobacco: Never   Substance and Sexual Activity     Alcohol use: Not Currently     Drug use: Never     Sexual activity: Yes     Partners: Male   Other Topics Concern     Not on file   Social History Narrative     Not on file     Social Determinants of Health     Financial Resource Strain: Not on file   Food Insecurity: Not on file   Transportation Needs: Not on file   Physical Activity: Not on file   Stress: Not on file   Social Connections: Not on file   Intimate Partner Violence: Not on file   Housing Stability: Not on file       Current Outpatient Medications   Medication     butalbital-acetaminophen-caffeine (ESGIC) -40 MG tablet     Cetirizine HCl (ZYRTEC PO)     cholecalciferol (VITAMIN D3) 25 mcg (1000 units) capsule     fexofenadine (ALLEGRA) 30 MG ODT     hydrOXYzine (VISTARIL) 50 MG capsule     labetalol (NORMODYNE) 200 MG tablet     norethindrone (MICRONOR) 0.35 MG tablet     oxyCODONE (ROXICODONE) 5 MG tablet     Prenatal Vit-Fe Fumarate-FA (PRENATAL MULTIVITAMIN W/IRON) 27-0.8 MG tablet     Probiotic Product (PROBIOTIC-10) CHEW     No current facility-administered medications for this visit.        No Known Allergies      Review of Systems   Constitutional, HEENT, cardiovascular, pulmonary, gi and gu systems are negative, except as otherwise noted.      Objective    BP (!) 133/91   Pulse 79   Temp 97.5  F (36.4  C)   Wt 71.2 kg (157 lb)   LMP 03/10/2022 (Approximate)   Breastfeeding Yes   BMI 26.95 kg/m    Body mass index is 26.95 kg/m .  Physical Exam   GENERAL: healthy, alert and no distress  NECK:  no adenopathy, no asymmetry, masses, or scars and thyroid normal to palpation  ABDOMEN: soft, nontender, no hepatosplenomegaly, no masses and bowel sounds normal   Skin: incision CDI. Small lump at left aspect consistent with suture reaction. Mildly tender there.    (female): normal female external genitalia, normal urethral meatus, vaginal mucosa, normal cervix/adnexa/uterus without masses or discharge. Atrophic introitus.   MS: no gross musculoskeletal defects noted, no edema  PSYCH: mentation appears normal, affect normal/bright    Results for orders placed or performed in visit on 12/15/22 (from the past 24 hour(s))   Wet prep - Clinic Collect    Specimen: Vagina; Swab   Result Value Ref Range    Trichomonas Absent Absent    Yeast Absent Absent    Clue Cells Absent Absent    WBCs/high power field 1+ (A) None

## 2022-12-29 ENCOUNTER — PRENATAL OFFICE VISIT (OUTPATIENT)
Dept: OBGYN | Facility: CLINIC | Age: 37
End: 2022-12-29
Payer: COMMERCIAL

## 2022-12-29 VITALS
WEIGHT: 157 LBS | BODY MASS INDEX: 26.95 KG/M2 | TEMPERATURE: 97 F | SYSTOLIC BLOOD PRESSURE: 127 MMHG | HEART RATE: 98 BPM | DIASTOLIC BLOOD PRESSURE: 84 MMHG

## 2022-12-29 DIAGNOSIS — Z12.4 CERVICAL CANCER SCREENING: ICD-10-CM

## 2022-12-29 DIAGNOSIS — Z12.4 SCREENING FOR MALIGNANT NEOPLASM OF CERVIX: ICD-10-CM

## 2022-12-29 DIAGNOSIS — Z98.891 S/P CESAREAN SECTION: ICD-10-CM

## 2022-12-29 PROCEDURE — 87624 HPV HI-RISK TYP POOLED RSLT: CPT | Performed by: OBSTETRICS & GYNECOLOGY

## 2022-12-29 PROCEDURE — G0145 SCR C/V CYTO,THINLAYER,RESCR: HCPCS | Performed by: OBSTETRICS & GYNECOLOGY

## 2022-12-29 PROCEDURE — 99207 PR POST PARTUM EXAM: CPT | Performed by: OBSTETRICS & GYNECOLOGY

## 2022-12-29 ASSESSMENT — PATIENT HEALTH QUESTIONNAIRE - PHQ9: SUM OF ALL RESPONSES TO PHQ QUESTIONS 1-9: 1

## 2022-12-29 NOTE — PROGRESS NOTES
SUBJECTIVE:  Meagan Mcgowan is a 37 year old female   here for a postpartum visit.  She had a repeat  Section 6 wks ago delivering a healthy baby boy weighing 7 lbs 1 oz at term.      Had a different experience with recovery this time. Incision concerns.  Still red at the left aspect.   Stopped labetalol 2 wks ago. She does not have chtn.       BP Readings from Last 6 Encounters:   22 127/84   12/15/22 (!) 133/91   22 (!) 145/86   22 (!) 144/95   22 (!) 138/91   11/10/22 120/76      delivery complications:  Delayed recovery this time compared to last time.   breast feeding:  Yes, going very well  bladder problems:  No  bowel problems/hemorrhoids:  No  episiotomy/laceration/incision healed? Mostly except for left aspect  vaginal flow:  None  [place]:  No  contraception:  Has rx for micronor  emotional adjustment:  doing well and happy       PHQ 2021 9/10/2021 12/15/2022   PHQ-9 Total Score 2 1 0   Q9: Thoughts of better off dead/self-harm past 2 weeks Not at all Not at all Not at all       JANA-7 SCORE 2021 2021 9/10/2021   Total Score 2 1 1       OBJECTIVE:  Blood pressure 127/84, pulse 98, temperature 97  F (36.1  C), weight 71.2 kg (157 lb), last menstrual period 03/10/2022, currently breastfeeding.   General - pleasant female in no acute distress.  Breast - no nodularity, asymmetry or nipple discharge bilaterally.  Abdomen - soft, nontender, nondistended, no hepatosplenomegaly.  Incision - slight erythema at the left end.  1 cm around the end, no drainage.  nontender   Pelvic - EG: normal adult female, BUS: within normal limits, Vagina: well rugated, no discharge, Cervix: no lesions or CMT, Uterus: firm, normal sized and nontender, Adnexae: no masses or tenderness.  Rectovaginal - deferred.    ASSESSMENT:  (Z39.2) Routine postpartum follow-up  (primary encounter diagnosis)   May resume normal activities without restrictions  Likely done having babies but  not 100% sure.   Will use micronor for now, but might also come in for mirena.   If no IUD then will use nuva ring when done breast feeding.   Reviewed contraception, family planning and breast feeding expectations, wound care.     (Z98.891) S/P  section  Comment: reviewed wound care, and normal expectations for healing.     (Z12.4) Cervical cancer screening  Comment:    Plan: Pap screen with HPV - recommended age 30 - 65         years          Melanie Pride MD

## 2023-01-02 LAB
BKR LAB AP GYN ADEQUACY: NORMAL
BKR LAB AP GYN INTERPRETATION: NORMAL
BKR LAB AP HPV REFLEX: NORMAL
BKR LAB AP PREVIOUS ABNORMAL: NORMAL
PATH REPORT.COMMENTS IMP SPEC: NORMAL
PATH REPORT.COMMENTS IMP SPEC: NORMAL
PATH REPORT.RELEVANT HX SPEC: NORMAL

## 2023-01-03 LAB
HUMAN PAPILLOMA VIRUS 16 DNA: NEGATIVE
HUMAN PAPILLOMA VIRUS 18 DNA: NEGATIVE
HUMAN PAPILLOMA VIRUS FINAL DIAGNOSIS: NORMAL
HUMAN PAPILLOMA VIRUS OTHER HR: NEGATIVE

## 2023-01-17 ENCOUNTER — PRENATAL OFFICE VISIT (OUTPATIENT)
Dept: OBGYN | Facility: CLINIC | Age: 38
End: 2023-01-17
Payer: COMMERCIAL

## 2023-01-17 VITALS
SYSTOLIC BLOOD PRESSURE: 137 MMHG | OXYGEN SATURATION: 100 % | BODY MASS INDEX: 26.78 KG/M2 | HEART RATE: 96 BPM | WEIGHT: 156 LBS | DIASTOLIC BLOOD PRESSURE: 76 MMHG

## 2023-01-17 DIAGNOSIS — Z30.430 ENCOUNTER FOR INSERTION OF INTRAUTERINE CONTRACEPTIVE DEVICE: ICD-10-CM

## 2023-01-17 DIAGNOSIS — Z30.430 ENCOUNTER FOR IUD INSERTION: Primary | ICD-10-CM

## 2023-01-17 LAB — HCG UR QL: NEGATIVE

## 2023-01-17 PROCEDURE — 58300 INSERT INTRAUTERINE DEVICE: CPT | Performed by: OBSTETRICS & GYNECOLOGY

## 2023-01-17 PROCEDURE — 81025 URINE PREGNANCY TEST: CPT | Performed by: OBSTETRICS & GYNECOLOGY

## 2023-01-17 NOTE — PATIENT INSTRUCTIONS

## 2023-01-17 NOTE — PROGRESS NOTES
IUD Insertion:  CONSULT:    Is a pregnancy test required: Yes.  Was it positive or negative?  Negative  Was a consent obtained?  Yes    Subjective: Meagan Mcgowan is a 37 year old  presents for IUD and desires Mirena type IUD.    Patient has been given the opportunity to ask questions about all forms of birth control, including all options appropriate for Meagan Mcgowan. Discussed that no method of birth control, except abstinence is 100% effective against pregnancy or sexually transmitted infection.     Meagan Mcgowan understands she may have the IUD removed at any time. IUD should be removed by a health care provider.    The entire insertion procedure was reviewed with the patient, including care after placement.    No LMP recorded. . No allergy to betadine or shellfish. Patient declines STD screening  HCG Qual Urine   Date Value Ref Range Status   2021 Positive (A) NEG^Negative Final     Comment:     This test is for screening purposes.  Results should be interpreted along with   the clinical picture.  Confirmation testing is available if warranted by   ordering [id number], HCG Quantitative Pregnancy.       hCG Urine Qualitative   Date Value Ref Range Status   2023 Negative Negative Final     Comment:     This test is for screening purposes.  Results should be interpreted along with the clinical picture.  Confirmation testing is available if warranted by ordering [id number], HCG Quantitative Pregnancy.         /76   Pulse 96   Wt 70.8 kg (156 lb)   SpO2 100%   Breastfeeding Yes   BMI 26.78 kg/m      Pelvic Exam:   EG/BUS: normal genital architecture without lesions, erythema or abnormal secretions.   Vagina: moist, pink, rugae with physiologic discharge and secretions  Cervix: nulliparous no lesions and pink, moist, closed, without lesion or CMT  Uterus: anteverted position, mobile, no pain  Adnexa: within normal limits and no masses, nodularity, tenderness    PROCEDURE NOTE: -- IUD  Insertion    Reason for Insertion: contraception    Paracervical block performed.  Under sterile technique, cervix was visualized with speculum and prepped with Betadine solution swab x 3. Tenaculum was placed for stability. The uterus was gently straightened and sounded to 7.0 cm. IUD prepared for placement, and IUD inserted according to 's instructions without difficulty or significant resitance, and deployed at the fundus. The strings were visualized and trimmed to 3.0 cm from the external os. Tenaculum was removed and hemostasis noted. Speculum removed.  Patient tolerated procedure well.    Lot # see MAR  Exp: see MAR    EBL: minimal    Complications: none    ASSESSMENT:     ICD-10-CM    1. Encounter for IUD insertion  Z30.430 HCG Qual, Urine ([id number])     levonorgestrel (MIRENA) 20 MCG/DAY IUD 20 mcg     INSERTION INTRAUTERINE DEVICE      2. Encounter for insertion of intrauterine contraceptive device  Z30.430            PLAN:    Given 's handouts, including when to have IUD removed, list of danger s/sx, side effects and follow up recommended. Encouraged condom use for prevention of STD. Back up contraception advised for 7 days if progestin method. Advised to call for any fever, for prolonged or severe pain or bleeding, abnormal vaginal discharge, or unable to palpate strings. She was advised to use pain medications (ibuprofen) as needed for mild to moderate pain. Advised to follow-up in clinic in 4-6 weeks for IUD string check if unable to find strings or as directed by provider.     Morelia Lira MD

## 2023-02-16 ENCOUNTER — OFFICE VISIT (OUTPATIENT)
Dept: OBGYN | Facility: CLINIC | Age: 38
End: 2023-02-16
Payer: COMMERCIAL

## 2023-02-16 VITALS
WEIGHT: 158 LBS | SYSTOLIC BLOOD PRESSURE: 130 MMHG | DIASTOLIC BLOOD PRESSURE: 75 MMHG | HEART RATE: 67 BPM | TEMPERATURE: 97.3 F | BODY MASS INDEX: 27.12 KG/M2

## 2023-02-16 DIAGNOSIS — Z30.431 IUD CHECK UP: Primary | ICD-10-CM

## 2023-02-16 PROCEDURE — 99212 OFFICE O/P EST SF 10 MIN: CPT | Performed by: OBSTETRICS & GYNECOLOGY

## 2023-02-16 ASSESSMENT — ANXIETY QUESTIONNAIRES
6. BECOMING EASILY ANNOYED OR IRRITABLE: NOT AT ALL
7. FEELING AFRAID AS IF SOMETHING AWFUL MIGHT HAPPEN: NOT AT ALL
IF YOU CHECKED OFF ANY PROBLEMS ON THIS QUESTIONNAIRE, HOW DIFFICULT HAVE THESE PROBLEMS MADE IT FOR YOU TO DO YOUR WORK, TAKE CARE OF THINGS AT HOME, OR GET ALONG WITH OTHER PEOPLE: NOT DIFFICULT AT ALL
GAD7 TOTAL SCORE: 0
2. NOT BEING ABLE TO STOP OR CONTROL WORRYING: NOT AT ALL
5. BEING SO RESTLESS THAT IT IS HARD TO SIT STILL: NOT AT ALL
1. FEELING NERVOUS, ANXIOUS, OR ON EDGE: NOT AT ALL
3. WORRYING TOO MUCH ABOUT DIFFERENT THINGS: NOT AT ALL
GAD7 TOTAL SCORE: 0

## 2023-02-16 ASSESSMENT — PATIENT HEALTH QUESTIONNAIRE - PHQ9
SUM OF ALL RESPONSES TO PHQ QUESTIONS 1-9: 0
5. POOR APPETITE OR OVEREATING: NOT AT ALL

## 2023-02-16 NOTE — PROGRESS NOTES
S:  Meagan presents for IUD check.  Mirena placed 23  Doing well. Still having spotting, had that with first IUD for a long time.   Only had cramping for a couple of days when spotting was heavier. Comfortable with observation.    Just got a new job working for [employer]    O:  Vitals:    23 1150   BP: 130/75   Pulse: 67   Temp: 97.3  F (36.3  C)   Weight: 71.7 kg (158 lb)         Gen: well appearing  Abd: soft, NT  : normal external genitalia. IUD strings at os.    A/P:  37 year old   with Mirena IUD  - Strings appropriate length  - Doing well  - RTC prn or annual in one year.

## 2024-01-14 ENCOUNTER — HEALTH MAINTENANCE LETTER (OUTPATIENT)
Age: 39
End: 2024-01-14

## 2024-03-08 NOTE — ED TRIAGE NOTES
Triage Assessment     Row Name 11/23/22 1218       Triage Assessment (Adult)    Airway WDL WDL       Respiratory WDL    Respiratory WDL WDL       Skin Circulation/Temperature WDL    Skin Circulation/Temperature WDL WDL       Cardiac WDL    Cardiac WDL WDL       Peripheral/Neurovascular WDL    Peripheral Neurovascular WDL WDL       Cognitive/Neuro/Behavioral WDL    Cognitive/Neuro/Behavioral WDL WDL               bipolar  depression/depression/mood swings

## 2024-03-24 ENCOUNTER — HEALTH MAINTENANCE LETTER (OUTPATIENT)
Age: 39
End: 2024-03-24

## 2024-10-10 ENCOUNTER — OFFICE VISIT (OUTPATIENT)
Dept: OBGYN | Facility: CLINIC | Age: 39
End: 2024-10-10
Payer: COMMERCIAL

## 2024-10-10 VITALS
DIASTOLIC BLOOD PRESSURE: 80 MMHG | BODY MASS INDEX: 26.29 KG/M2 | HEIGHT: 64 IN | TEMPERATURE: 97.8 F | HEART RATE: 87 BPM | WEIGHT: 154 LBS | SYSTOLIC BLOOD PRESSURE: 132 MMHG

## 2024-10-10 DIAGNOSIS — Z01.419 ENCOUNTER FOR GYNECOLOGICAL EXAMINATION WITHOUT ABNORMAL FINDING: Primary | ICD-10-CM

## 2024-10-10 DIAGNOSIS — F41.1 GAD (GENERALIZED ANXIETY DISORDER): ICD-10-CM

## 2024-10-10 DIAGNOSIS — Z23 NEED FOR PROPHYLACTIC VACCINATION AND INOCULATION AGAINST INFLUENZA: ICD-10-CM

## 2024-10-10 DIAGNOSIS — Z12.31 ENCOUNTER FOR SCREENING MAMMOGRAM FOR BREAST CANCER: ICD-10-CM

## 2024-10-10 LAB
ALBUMIN SERPL BCG-MCNC: 4.9 G/DL (ref 3.5–5.2)
ALP SERPL-CCNC: 55 U/L (ref 40–150)
ALT SERPL W P-5'-P-CCNC: 12 U/L (ref 0–50)
ANION GAP SERPL CALCULATED.3IONS-SCNC: 11 MMOL/L (ref 7–15)
AST SERPL W P-5'-P-CCNC: 18 U/L (ref 0–45)
BILIRUB SERPL-MCNC: 0.6 MG/DL
BUN SERPL-MCNC: 16.7 MG/DL (ref 6–20)
CALCIUM SERPL-MCNC: 9.3 MG/DL (ref 8.8–10.4)
CHLORIDE SERPL-SCNC: 104 MMOL/L (ref 98–107)
CHOLEST SERPL-MCNC: 153 MG/DL
CREAT SERPL-MCNC: 0.78 MG/DL (ref 0.51–0.95)
EGFRCR SERPLBLD CKD-EPI 2021: >90 ML/MIN/1.73M2
FASTING STATUS PATIENT QL REPORTED: YES
FASTING STATUS PATIENT QL REPORTED: YES
GLUCOSE SERPL-MCNC: 92 MG/DL (ref 70–99)
HCO3 SERPL-SCNC: 22 MMOL/L (ref 22–29)
HDLC SERPL-MCNC: 54 MG/DL
LDLC SERPL CALC-MCNC: 88 MG/DL
NONHDLC SERPL-MCNC: 99 MG/DL
POTASSIUM SERPL-SCNC: 4.4 MMOL/L (ref 3.4–5.3)
PROT SERPL-MCNC: 7.8 G/DL (ref 6.4–8.3)
SODIUM SERPL-SCNC: 137 MMOL/L (ref 135–145)
TRIGL SERPL-MCNC: 57 MG/DL

## 2024-10-10 PROCEDURE — 90656 IIV3 VACC NO PRSV 0.5 ML IM: CPT | Performed by: OBSTETRICS & GYNECOLOGY

## 2024-10-10 PROCEDURE — 99459 PELVIC EXAMINATION: CPT | Performed by: OBSTETRICS & GYNECOLOGY

## 2024-10-10 PROCEDURE — 36415 COLL VENOUS BLD VENIPUNCTURE: CPT | Performed by: OBSTETRICS & GYNECOLOGY

## 2024-10-10 PROCEDURE — 80053 COMPREHEN METABOLIC PANEL: CPT | Performed by: OBSTETRICS & GYNECOLOGY

## 2024-10-10 PROCEDURE — 80061 LIPID PANEL: CPT | Performed by: OBSTETRICS & GYNECOLOGY

## 2024-10-10 PROCEDURE — 99396 PREV VISIT EST AGE 40-64: CPT | Mod: 25 | Performed by: OBSTETRICS & GYNECOLOGY

## 2024-10-10 PROCEDURE — 90471 IMMUNIZATION ADMIN: CPT | Performed by: OBSTETRICS & GYNECOLOGY

## 2024-10-10 ASSESSMENT — PATIENT HEALTH QUESTIONNAIRE - PHQ9
5. POOR APPETITE OR OVEREATING: MORE THAN HALF THE DAYS
SUM OF ALL RESPONSES TO PHQ QUESTIONS 1-9: 7

## 2024-10-10 ASSESSMENT — ANXIETY QUESTIONNAIRES
1. FEELING NERVOUS, ANXIOUS, OR ON EDGE: MORE THAN HALF THE DAYS
IF YOU CHECKED OFF ANY PROBLEMS ON THIS QUESTIONNAIRE, HOW DIFFICULT HAVE THESE PROBLEMS MADE IT FOR YOU TO DO YOUR WORK, TAKE CARE OF THINGS AT HOME, OR GET ALONG WITH OTHER PEOPLE: SOMEWHAT DIFFICULT
5. BEING SO RESTLESS THAT IT IS HARD TO SIT STILL: NOT AT ALL
7. FEELING AFRAID AS IF SOMETHING AWFUL MIGHT HAPPEN: MORE THAN HALF THE DAYS
GAD7 TOTAL SCORE: 11
GAD7 TOTAL SCORE: 11
3. WORRYING TOO MUCH ABOUT DIFFERENT THINGS: MORE THAN HALF THE DAYS
6. BECOMING EASILY ANNOYED OR IRRITABLE: MORE THAN HALF THE DAYS
2. NOT BEING ABLE TO STOP OR CONTROL WORRYING: SEVERAL DAYS

## 2024-10-10 NOTE — PROGRESS NOTES
Meagan is a 39 year old  female who presents for annual exam.     Menses are pt has IUD (mirena) and  lasting  days.  Menses flow: .  No LMP recorded. (Menstrual status: IUD).. Using IUD for contraception.  She is not currently considering pregnancy.  Besides routine health maintenance, she has no other health concerns today .  Has been struggling with anxiety. Lays awake at night sometimes worrying. Isn't doing activities that usually give her pleasure. Is trying to decide whether or not to have another baby.   Her dad had a stroke last year, so is going through grief symptoms and elder care issues.   PHQ-2 Score:         10/10/2024    10:04 AM 10/9/2024     9:54 AM   PHQ-2 (  [employer])   Q1: Little interest or pleasure in doing things 1 1   Q2: Feeling down, depressed or hopeless 1 1   PHQ-2 Score 2 2   Q1: Little interest or pleasure in doing things  Several days   Q2: Feeling down, depressed or hopeless  Several days   PHQ-2 Score  2           9/10/2021     9:55 AM 2023     1:00 PM 10/10/2024    10:04 AM   JANA-7 SCORE   Total Score 1 0 11           2022     3:55 PM 2023     1:00 PM 10/10/2024    10:04 AM   PHQ   PHQ-9 Total Score 1 0 7   Q9: Thoughts of better off dead/self-harm past 2 weeks Not at all Not at all Not at all       GYNECOLOGIC HISTORY:  Menarche: 11-10  Meagan is sexually active with 1male partner(s) and is currently in monogamous relationship.    History sexually transmitted infections:No STD history  STI testing offered?  Declined  MARIA E exposure: No  History of abnormal Pap smear: No - age 30-64 HPV with reflex Pap every 5 years recommended  Family history of breast CA: No  Family history of uterine/ovarian CA: No    Family history of colon CA: No    HEALTH MAINTENANCE:  Cholesterol: (  Cholesterol   Date Value Ref Range Status   10/10/2024 153 <200 mg/dL Final    History of abnormal lipids: No  Mammo:  . History of abnormal Mammo: No.  Regular Self Breast Exams:  "Yes  Calcium/Vitamin D intake: source:  dairy, dietary supplement(s) Adequate? Yes  TSH: (  TSH   Date Value Ref Range Status   12/15/2022 0.84 0.30 - 4.20 uIU/mL Final    )  Pap; (No results found for: \"PAP\" )    HISTORY:  OB History    Para Term  AB Living   2 2 2 0 0 2   SAB IAB Ectopic Multiple Live Births   0 0 0 0 2      # Outcome Date GA Lbr Inder/2nd Weight Sex Type Anes PTL Lv   2 Term 11/15/22 39w0d  3.21 kg (7 lb 1.2 oz) M CS-LTranv Spinal N JOURDAN      Name: SHIVAM VENTURA      Apgar1: 9  Apgar5: 9   1 Term 21 39w5d  3.09 kg (6 lb 13 oz) M CS-LTranv  N JOURDAN      Complications: Fetal Intolerance      Name: Khai      Apgar1: 7  Apgar5: 7      Obstetric Comments   [place]     Past Medical History:   Diagnosis Date    Abnormal Pap smear of cervix     Anemia     Mild asthma      Past Surgical History:   Procedure Laterality Date     SECTION N/A 2021    Procedure:  SECTION;  Surgeon: Julissa Garcia MD;  [facility]     SECTION N/A 11/15/2022    Procedure:  SECTION;  Surgeon: Morelia Lira MD;  [facility]    wisdom teeth       Family History   Problem Relation Age of Onset    Asthma Mother     Tongue cancer Father     Hypertension Father     Diabetes Paternal Grandmother     Heart Failure Paternal Grandmother     Childhood Heart Disease Brother      Social History     Socioeconomic History    Marital status:      Spouse name: None    Number of children: None    Years of education: None    Highest education level: None   Tobacco Use    Smoking status: Never    Smokeless tobacco: Never   Vaping Use    Vaping status: Never Used   Substance and Sexual Activity    Alcohol use: Not Currently    Drug use: Never    Sexual activity: Yes     Partners: Male     Birth control/protection: I.U.D.       Current Outpatient Medications:     Cetirizine HCl (ZYRTEC PO), , Disp: , Rfl:     fexofenadine (ALLEGRA) 30 MG ODT, Take 30 mg by " "mouth 2 times daily, Disp: , Rfl:     levonorgestrel (MIRENA) 20 MCG/DAY IUD, 1 each (20 mcg) by Intrauterine route once, Disp: , Rfl:     Probiotic Product (PROBIOTIC-10) CHEW, , Disp: , Rfl:    No Known Allergies    Past medical, surgical, social and family history were reviewed and updated in EPIC.    ROS:   C:     NEGATIVE for fever, chills, change in weight  I:       NEGATIVE for worrisome rashes, moles or lesions  E:     NEGATIVE for vision changes or irritation  E/M: NEGATIVE for ear, mouth and throat problems  R:     NEGATIVE for significant cough or SOB  CV:   NEGATIVE for chest pain, palpitations or peripheral edema  GI:     NEGATIVE for nausea, abdominal pain, heartburn, or change in bowel habits  :   NEGATIVE for frequency, dysuria, hematuria, vaginal discharge, or irregular bleeding  M:     NEGATIVE for significant arthralgias or myalgia  N:      NEGATIVE for weakness, dizziness or paresthesias  E:      NEGATIVE for temperature intolerance, skin/hair changes  P:      NEGATIVE for changes in mood or affect.    EXAM:  /80   Pulse 87   Temp 97.8  F (36.6  C)   Ht 1.626 m (5' 4\")   Wt 69.9 kg (154 lb)   Breastfeeding No   BMI 26.43 kg/m     BMI: Body mass index is 26.43 kg/m .  Constitutional: healthy, alert and no distress  Head: Normocephalic. No masses, lesions, tenderness or abnormalities  Neck: Neck supple. Trachea midline. No adenopathy. Thyroid symmetric, normal size.   Cardiovascular: RRR.   Respiratory: Negative.   Breast: Breasts reveal mild symmetric fibrocystic densities, but there are no dominant, discrete, fixed or suspicious masses found.  Gastrointestinal: Abdomen soft, non-tender, non-distended. No masses, organomegaly.  :  Vulva:  No external lesions, normal female hair distribution, no inguinal adenopathy.    Urethra:  Midline, non-tender, well supported, no discharge  Vagina:  Moist, pink, no abnormal discharge, no lesions  Rectal Exam: deferred  Musculoskeletal: " extremities normal  Skin: no suspicious lesions or rashes  Psychiatric: Affect appropriate, cooperative,mentation appears normal.     COUNSELING:   Reviewed preventive health counseling, as reflected in patient instructions       Regular exercise       Healthy diet/nutrition       Contraception       Family planning   reports that she has never smoked. She has never used smokeless tobacco.    Body mass index is 26.43 kg/m .    FRAX Risk Assessment    ASSESSMENT:  39 year old female with satisfactory annual exam  (Z01.419) Encounter for gynecological examination without abnormal finding  (primary encounter diagnosis)  Comment:   Plan: Lipid Profile (Chol, Trig, HDL, LDL calc),         Comprehensive metabolic panel (BMP + Alb, Alk         Phos, ALT, AST, Total. Bili, TP)            (Z23) Need for prophylactic vaccination and inoculation against influenza  Comment:   Plan: INFLUENZA VACCINE,SPLIT         VIRUS,TRIVALENT,PF(FLUZONE)            (Z12.31) Encounter for screening mammogram for breast cancer  Comment:   Plan: MA Screen Bilateral w/Oleg            (F41.1) JANA (generalized anxiety disorder)  Comment:   Plan:   Discussed PHQ 9 and JANA and mood symptoms.   Dicussed options for management Recommend selective serotonin reuptake inhibitor (lexapro?) or CBT or both. Discussed also establishing with therapist for grief.   She will think about it for 2 weeks and let me know.

## 2024-10-25 ENCOUNTER — ANCILLARY PROCEDURE (OUTPATIENT)
Dept: MAMMOGRAPHY | Facility: CLINIC | Age: 39
End: 2024-10-25
Attending: OBSTETRICS & GYNECOLOGY
Payer: COMMERCIAL

## 2024-10-25 DIAGNOSIS — Z12.31 ENCOUNTER FOR SCREENING MAMMOGRAM FOR BREAST CANCER: ICD-10-CM

## 2024-10-25 PROCEDURE — 77063 BREAST TOMOSYNTHESIS BI: CPT | Mod: TC | Performed by: RADIOLOGY

## 2024-10-25 PROCEDURE — 77067 SCR MAMMO BI INCL CAD: CPT | Mod: TC | Performed by: RADIOLOGY

## 2025-03-13 ENCOUNTER — OFFICE VISIT (OUTPATIENT)
Dept: OBGYN | Facility: CLINIC | Age: 40
End: 2025-03-13
Payer: COMMERCIAL

## 2025-03-13 VITALS
SYSTOLIC BLOOD PRESSURE: 121 MMHG | DIASTOLIC BLOOD PRESSURE: 83 MMHG | BODY MASS INDEX: 25.58 KG/M2 | WEIGHT: 149 LBS | HEART RATE: 89 BPM

## 2025-03-13 DIAGNOSIS — N76.2 ACUTE VULVITIS: ICD-10-CM

## 2025-03-13 DIAGNOSIS — N89.8 VAGINAL DRYNESS: Primary | ICD-10-CM

## 2025-03-13 RX ORDER — ESTRADIOL 0.1 MG/G
CREAM VAGINAL
Qty: 42.5 G | Refills: 3 | Status: SHIPPED | OUTPATIENT
Start: 2025-03-13

## 2025-03-13 RX ORDER — BETAMETHASONE DIPROPIONATE 0.5 MG/G
OINTMENT, AUGMENTED TOPICAL PRN
COMMUNITY
Start: 2025-03-04

## 2025-03-13 RX ORDER — ALBUTEROL SULFATE 90 UG/1
1-2 INHALANT RESPIRATORY (INHALATION)
COMMUNITY
Start: 2023-11-01

## 2025-03-13 RX ORDER — MULTIVIT-MIN/IRON FUM/FOLIC AC 7.5 MG-4
1 TABLET ORAL DAILY
COMMUNITY

## 2025-03-13 NOTE — PROGRESS NOTES
"  Assessment & Plan     Vaginal dryness  Recommend estradiol  Can use during the week of dryness or use for 14 days then twice a week after   Recommend silicone based lubrication  Consider vaginal moisturizer.   - estradiol (ESTRACE) 0.1 MG/GM vaginal cream; Apply 0.5 g daily for two weeks then twice weekly    Acute vulvitis  Has mild inflammation of left bartholin's gland   Does not seem pathologic  Anticipate resolution but gave precautions for Bartholin's gland cyst.           BMI  Estimated body mass index is 25.58 kg/m  as calculated from the following:    Height as of 10/10/24: 1.626 m (5' 4\").    Weight as of this encounter: 67.6 kg (149 lb).             No follow-ups on file.    Trisha Rhodes is a 39 year old, presenting for the following health issues:  Vaginal Problem (Bump on vagina/dryness)    HPI    Presents for evaluation of vulvar bump  Noticed it three weeks ago in the shower.   Not frankly painful, a little uncomfortable.   On her left side, close to vaginal opening.     Does also have vaginal dryness in the week before her period.   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     Anxiety is slowly improving.   Her dad is doing better - starting to golf again.   Tried lexapro for 5 days and got GI upset so stopped.     Past Medical History:   Diagnosis Date    Abnormal Pap smear of cervix     Anemia     Mild asthma        Past Surgical History:   Procedure Laterality Date     SECTION N/A 2021    Procedure:  SECTION;  Surgeon: Julissa Garcia MD;  Location: [facility]     SECTION N/A 11/15/2022    Procedure:  SECTION;  Surgeon: Morelia Lira MD;  Location: [facility]    wisdom teeth         Family History   Problem Relation Age of Onset    Asthma Mother     Tongue cancer Father     Hypertension Father     Diabetes Paternal Grandmother     Heart Failure Paternal Grandmother     Childhood Heart Disease Brother  "       Social History     Socioeconomic History    Marital status:      Spouse name: Not on file    Number of children: Not on file    Years of education: Not on file    Highest education level: Not on file   Occupational History    Not on file   Tobacco Use    Smoking status: Never     Passive exposure: Never    Smokeless tobacco: Never   Vaping Use    Vaping status: Never Used   Substance and Sexual Activity    Alcohol use: Not Currently    Drug use: Never    Sexual activity: Yes     Partners: Male     Birth control/protection: I.U.D.   Other Topics Concern    Not on file   Social History Narrative    Not on file     Social Drivers of Health     Financial Resource Strain: Not on file   Food Insecurity: Not on file   Transportation Needs: Not on file   Physical Activity: Not on file   Stress: Not on file   Social Connections: Not on file   Interpersonal Safety: Not on file   Housing Stability: Not on file       Current Outpatient Medications   Medication Sig Dispense Refill    albuterol (PROAIR HFA/PROVENTIL HFA/VENTOLIN HFA) 108 (90 Base) MCG/ACT inhaler Inhale 1-2 puffs into the lungs.      augmented betamethasone dipropionate (DIPROLENE-AF) 0.05 % external ointment Apply topically as needed.      Cetirizine HCl (ZYRTEC PO)       estradiol (ESTRACE) 0.1 MG/GM vaginal cream Apply 0.5 g daily for two weeks then twice weekly 42.5 g 3    fexofenadine (ALLEGRA) 30 MG ODT Take 30 mg by mouth 2 times daily      levonorgestrel (MIRENA) 20 MCG/DAY IUD 1 each (20 mcg) by Intrauterine route once      multivitamin w/minerals (MULTIVITAMINS W/MINERALS) tablet Take 1 tablet by mouth daily.      Probiotic Product (PROBIOTIC-10) CHEW        No current facility-administered medications for this visit.        No Known Allergies        Review of Systems  Constitutional, HEENT, cardiovascular, pulmonary, gi and gu systems are negative, except as otherwise noted.      Objective    /83 (BP Location: Right arm, Patient  Position: Sitting, Cuff Size: Adult Regular)   Pulse 89   Wt 67.6 kg (149 lb)   LMP 03/01/2025 (Approximate)   BMI 25.58 kg/m    Body mass index is 25.58 kg/m .  Physical Exam   GENERAL: alert and no distress  ABDOMEN: soft, nontender, no hepatosplenomegaly, no masses and bowel sounds normal   (female): normal female external genitalia, normal urethral meatus, normal vaginal mucosa. Bump is in location of left bartholin's gland opening. Firm, slightly tender. No Bartholin's gland cyst.   MS: no gross musculoskeletal defects noted, no edema  PSYCH: mentation appears normal, affect normal/bright            Signed Electronically by: Morelia Lira MD

## 2025-06-23 ENCOUNTER — MYC REFILL (OUTPATIENT)
Dept: OBGYN | Facility: CLINIC | Age: 40
End: 2025-06-23
Payer: COMMERCIAL

## 2025-06-23 DIAGNOSIS — N89.8 VAGINAL DRYNESS: ICD-10-CM

## 2025-06-23 RX ORDER — ESTRADIOL 0.1 MG/G
CREAM VAGINAL
Qty: 42.5 G | Refills: 3 | Status: SHIPPED | OUTPATIENT
Start: 2025-06-23

## (undated) DEVICE — ESU GROUND PAD UNIVERSAL W/O CORD

## (undated) DEVICE — GLOVE PROTEXIS BLUE W/NEU-THERA 6.5  2D73EB65

## (undated) DEVICE — SOL WATER IRRIG 1000ML BOTTLE 07139-09

## (undated) DEVICE — CATH TRAY FOLEY 16FR BARDEX W/DRAIN BAG STATLOCK 300316A

## (undated) DEVICE — SU VICRYL 0 CT-1 36" J346H

## (undated) DEVICE — BNDG ABDOMINAL BINDER 10X26-50" 08140145

## (undated) DEVICE — SOL ADH LIQUID BENZOIN SWAB 0.6ML C1544

## (undated) DEVICE — STOCKING SLEEVE COMPRESSION CALF LG

## (undated) DEVICE — PACK C-SECTION LF PL15OTA83B

## (undated) DEVICE — SU VICRYL 3-0 CTX 36" UND J980H

## (undated) DEVICE — DEVICE FETAL PILLOW BALLOON SIL PF-010

## (undated) DEVICE — SOL NACL 0.9% IRRIG 1000ML BOTTLE 07138-09

## (undated) DEVICE — GLOVE ESTEEM POWDER FREE SMT 6.5  2D72PT65

## (undated) DEVICE — SU MONOCRYL 0 CT-1 36" Y346H

## (undated) DEVICE — DRSG STERI STRIP 1/4X3" R1541

## (undated) DEVICE — GLOVE PROTEXIS BLUE W/NEU-THERA 7.0  2D73EB70

## (undated) DEVICE — PREP CHLORAPREP 26ML TINTED ORANGE  260815

## (undated) DEVICE — SU VICRYL 4-0 PS-2 18" UND J496G

## (undated) DEVICE — STRAP KNEE/BODY 31143004

## (undated) DEVICE — SU VICRYL 4-0 KS 27" UND J662H

## (undated) RX ORDER — ACETAMINOPHEN 325 MG/1
TABLET ORAL
Status: DISPENSED
Start: 2021-08-25

## (undated) RX ORDER — ONDANSETRON 2 MG/ML
INJECTION INTRAMUSCULAR; INTRAVENOUS
Status: DISPENSED
Start: 2021-08-25

## (undated) RX ORDER — HYDROMORPHONE HCL IN WATER/PF 6 MG/30 ML
PATIENT CONTROLLED ANALGESIA SYRINGE INTRAVENOUS
Status: DISPENSED
Start: 2021-08-25

## (undated) RX ORDER — ONDANSETRON 2 MG/ML
INJECTION INTRAMUSCULAR; INTRAVENOUS
Status: DISPENSED
Start: 2022-11-15

## (undated) RX ORDER — OXYTOCIN/0.9 % SODIUM CHLORIDE 30/500 ML
PLASTIC BAG, INJECTION (ML) INTRAVENOUS
Status: DISPENSED
Start: 2021-08-25

## (undated) RX ORDER — FENTANYL CITRATE 50 UG/ML
INJECTION, SOLUTION INTRAMUSCULAR; INTRAVENOUS
Status: DISPENSED
Start: 2022-11-15

## (undated) RX ORDER — FENTANYL CITRATE-0.9 % NACL/PF 10 MCG/ML
PLASTIC BAG, INJECTION (ML) INTRAVENOUS
Status: DISPENSED
Start: 2021-08-25

## (undated) RX ORDER — LIDOCAINE HYDROCHLORIDE 20 MG/ML
INJECTION, SOLUTION EPIDURAL; INFILTRATION; INTRACAUDAL; PERINEURAL
Status: DISPENSED
Start: 2022-11-15

## (undated) RX ORDER — KETOROLAC TROMETHAMINE 30 MG/ML
INJECTION, SOLUTION INTRAMUSCULAR; INTRAVENOUS
Status: DISPENSED
Start: 2021-08-25

## (undated) RX ORDER — KETOROLAC TROMETHAMINE 30 MG/ML
INJECTION, SOLUTION INTRAMUSCULAR; INTRAVENOUS
Status: DISPENSED
Start: 2022-11-15

## (undated) RX ORDER — LIDOCAINE HCL/EPINEPHRINE/PF 2%-1:200K
VIAL (ML) INJECTION
Status: DISPENSED
Start: 2021-08-25

## (undated) RX ORDER — OXYTOCIN/0.9 % SODIUM CHLORIDE 30/500 ML
PLASTIC BAG, INJECTION (ML) INTRAVENOUS
Status: DISPENSED
Start: 2022-11-15

## (undated) RX ORDER — PHENYLEPHRINE HCL IN 0.9% NACL 50MG/250ML
PLASTIC BAG, INJECTION (ML) INTRAVENOUS
Status: DISPENSED
Start: 2022-11-15

## (undated) RX ORDER — GLYCOPYRROLATE 0.2 MG/ML
INJECTION, SOLUTION INTRAMUSCULAR; INTRAVENOUS
Status: DISPENSED
Start: 2022-11-15

## (undated) RX ORDER — PHENYLEPHRINE HCL IN 0.9% NACL 50MG/250ML
PLASTIC BAG, INJECTION (ML) INTRAVENOUS
Status: DISPENSED
Start: 2021-08-25

## (undated) RX ORDER — TRANEXAMIC ACID 10 MG/ML
INJECTION, SOLUTION INTRAVENOUS
Status: DISPENSED
Start: 2022-11-15

## (undated) RX ORDER — CEFAZOLIN SODIUM 1 G/3ML
INJECTION, POWDER, FOR SOLUTION INTRAMUSCULAR; INTRAVENOUS
Status: DISPENSED
Start: 2021-08-25

## (undated) RX ORDER — MORPHINE SULFATE 1 MG/ML
INJECTION, SOLUTION EPIDURAL; INTRATHECAL; INTRAVENOUS
Status: DISPENSED
Start: 2022-11-15

## (undated) RX ORDER — CEFAZOLIN SODIUM 1 G/3ML
INJECTION, POWDER, FOR SOLUTION INTRAMUSCULAR; INTRAVENOUS
Status: DISPENSED
Start: 2022-11-15

## (undated) RX ORDER — PROPOFOL 10 MG/ML
INJECTION, EMULSION INTRAVENOUS
Status: DISPENSED
Start: 2022-11-15

## (undated) RX ORDER — LIDOCAINE HYDROCHLORIDE 20 MG/ML
INJECTION, SOLUTION EPIDURAL; INFILTRATION; INTRACAUDAL; PERINEURAL
Status: DISPENSED
Start: 2021-08-25